# Patient Record
Sex: MALE | Race: WHITE | NOT HISPANIC OR LATINO | Employment: FULL TIME | URBAN - METROPOLITAN AREA
[De-identification: names, ages, dates, MRNs, and addresses within clinical notes are randomized per-mention and may not be internally consistent; named-entity substitution may affect disease eponyms.]

---

## 2020-06-11 ENCOUNTER — OFFICE VISIT (OUTPATIENT)
Dept: PODIATRY | Facility: CLINIC | Age: 65
End: 2020-06-11
Payer: COMMERCIAL

## 2020-06-11 VITALS
DIASTOLIC BLOOD PRESSURE: 103 MMHG | BODY MASS INDEX: 31.92 KG/M2 | HEART RATE: 81 BPM | SYSTOLIC BLOOD PRESSURE: 220 MMHG | HEIGHT: 65 IN | WEIGHT: 191.6 LBS

## 2020-06-11 DIAGNOSIS — E11.40 TYPE 2 DIABETES MELLITUS WITH DIABETIC NEUROPATHY, WITHOUT LONG-TERM CURRENT USE OF INSULIN (HCC): ICD-10-CM

## 2020-06-11 DIAGNOSIS — L97.519 ULCER OF RIGHT FOOT, UNSPECIFIED ULCER STAGE (HCC): Primary | ICD-10-CM

## 2020-06-11 DIAGNOSIS — Z89.421 ABSENCE OF TOE OF RIGHT FOOT (HCC): ICD-10-CM

## 2020-06-11 DIAGNOSIS — Z89.512 ABSENCE OF LEFT LOWER LEG BELOW KNEE (HCC): ICD-10-CM

## 2020-06-11 PROCEDURE — 99204 OFFICE O/P NEW MOD 45 MIN: CPT | Performed by: PODIATRIST

## 2020-06-11 RX ORDER — LISINOPRIL 20 MG/1
TABLET ORAL
COMMUNITY
Start: 2019-08-05 | End: 2021-05-21

## 2020-06-18 ENCOUNTER — PROCEDURE VISIT (OUTPATIENT)
Dept: PODIATRY | Facility: CLINIC | Age: 65
End: 2020-06-18
Payer: COMMERCIAL

## 2020-06-18 VITALS — BODY MASS INDEX: 32.12 KG/M2 | WEIGHT: 192.8 LBS | HEIGHT: 65 IN

## 2020-06-18 DIAGNOSIS — Z89.512 ABSENCE OF LEFT LOWER LEG BELOW KNEE (HCC): ICD-10-CM

## 2020-06-18 DIAGNOSIS — E11.40 TYPE 2 DIABETES MELLITUS WITH DIABETIC NEUROPATHY, WITHOUT LONG-TERM CURRENT USE OF INSULIN (HCC): ICD-10-CM

## 2020-06-18 DIAGNOSIS — L97.519 ULCER OF RIGHT FOOT, UNSPECIFIED ULCER STAGE (HCC): Primary | ICD-10-CM

## 2020-06-18 DIAGNOSIS — Z89.421 ABSENCE OF TOE OF RIGHT FOOT (HCC): ICD-10-CM

## 2020-06-18 PROCEDURE — 99213 OFFICE O/P EST LOW 20 MIN: CPT | Performed by: PODIATRIST

## 2020-07-02 ENCOUNTER — PROCEDURE VISIT (OUTPATIENT)
Dept: PODIATRY | Facility: CLINIC | Age: 65
End: 2020-07-02
Payer: COMMERCIAL

## 2020-07-02 VITALS — TEMPERATURE: 98.1 F

## 2020-07-02 DIAGNOSIS — Z89.421 ABSENCE OF TOE OF RIGHT FOOT (HCC): ICD-10-CM

## 2020-07-02 DIAGNOSIS — Z89.512 ABSENCE OF LEFT LOWER LEG BELOW KNEE (HCC): ICD-10-CM

## 2020-07-02 DIAGNOSIS — E11.40 TYPE 2 DIABETES MELLITUS WITH DIABETIC NEUROPATHY, WITHOUT LONG-TERM CURRENT USE OF INSULIN (HCC): ICD-10-CM

## 2020-07-02 DIAGNOSIS — L97.519 ULCER OF RIGHT FOOT, UNSPECIFIED ULCER STAGE (HCC): Primary | ICD-10-CM

## 2020-07-02 PROCEDURE — 99213 OFFICE O/P EST LOW 20 MIN: CPT | Performed by: PODIATRIST

## 2020-07-02 NOTE — PROGRESS NOTES
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)             PLAN:  1  Patient was counseled and educated on the condition and the diagnosis  2  His wound is healing well without signs of infection  D/C silver alginate  Use topical antibiotic cream daily with dry dressing  3  Discussed proper off-loading and staying off of his foot in order to heal the ulcer  Continue walker and surgical shoe  4  Awaits new diabetic shoes  5  Discussed possible TREMAYNE or TA tendon transfer to correct ankle equinus and muscle imbalance in the future  6  Discussed proper BS control  7  He will return in 2 weeks for re-evaluation of wound  Subjective:     HPI  The patient presents for right foot ulcer  He feels well and wound looks healing  No foot pain  No redness or purulence  BS is under control  He presents with walker and surgical shoe  The following portions of the patient's history were reviewed and updated as appropriate: allergies, current medications, past family history, past medical history, past social history, past surgical history and problem list   All pertinent labs and images were reviewed  Past Medical History  Past Medical History:   Diagnosis Date    Diabetes mellitus ([facility] )     Hypertension     Neuropathy in diabetes [facility])        Past Surgical History  Past Surgical History:   Procedure Laterality Date    AMPUTATION      FOOT SURGERY          Allergies:  Patient has no known allergies      Medications:  Current Outpatient Medications   Medication Sig Dispense Refill    lisinopril (ZESTRIL) 20 mg tablet TAKE 1 TABLET BY MOUTH EVERY DAY IN THE AM      metFORMIN (GLUCOPHAGE) 500 mg tablet Take 500 mg by mouth daily       No current facility-administered medications for this visit  Social History:  Social History     Socioeconomic History    Marital status: Single     Spouse name: None    Number of children: None    Years of education: None    Highest education level: None   Occupational History    None   Social Needs    Financial resource strain: None    Food insecurity:     Worry: None     Inability: None    Transportation needs:     Medical: None     Non-medical: None   Tobacco Use    Smoking status: Current Some Day Smoker     Types: Pipe    Smokeless tobacco: Never Used   Substance and Sexual Activity    Alcohol use: Yes     Alcohol/week: 1 0 standard drinks     Types: 1 Cans of beer per week     Frequency: 4 or more times a week     Drinks per session: 1 or 2    Drug use: Never    Sexual activity: None   Lifestyle    Physical activity:     Days per week: None     Minutes per session: None    Stress: None   Relationships    Social connections:     Talks on phone: None     Gets together: None     Attends Denominational service: None     Active member of club or organization: None     Attends meetings of clubs or organizations: None     Relationship status: None    Intimate partner violence:     Fear of current or ex partner: None     Emotionally abused: None     Physically abused: None     Forced sexual activity: None   Other Topics Concern    None   Social History Narrative    None          Review of Systems   Constitutional: Negative for chills and fever  HENT: Negative for sore throat  Respiratory: Negative for cough and shortness of breath  Cardiovascular: Negative for chest pain  Gastrointestinal: Negative for diarrhea, nausea and vomiting  Skin: Positive for wound  Neurological: Positive for numbness  Negative for weakness  Objective:      Temp 98 1 °F (36 7 °C)          Physical Exam   Constitutional: He is oriented to person, place, and time   He appears well-developed and well-nourished  No distress  Cardiovascular: Normal rate, regular rhythm and intact distal pulses  Pulses:       Dorsalis pedis pulses are 1+ on the right side  Posterior tibial pulses are 1+ on the right side  Pulmonary/Chest: Effort normal and breath sounds normal  No respiratory distress  Musculoskeletal:        Right foot: There is decreased range of motion (Right ankle equinus)  BKA left  TMA right  No acute joint inflammation or edema  Chronic right LE edema with venous stasis  No cellulitis RLE  Feet:   Right Foot: amputated  Skin Integrity: Positive for ulcer and dry skin  Negative for skin breakdown, erythema, warmth or callus  Left Foot: amputated  Neurological: He is alert and oriented to person, place, and time  No cranial nerve deficit  Decreased protective sensation on right foot  Skin: Capillary refill takes less than 2 seconds  No erythema  DFU right TMA stump  It is 0 3 X 0 2 X 0 1 cm  Wound is deep to dermal tissues  No deep probing  No redness, purulence, edema, or signs of infection  Skin is dry  No periwound callus  Psychiatric: His behavior is normal    Vitals reviewed

## 2020-07-16 ENCOUNTER — PROCEDURE VISIT (OUTPATIENT)
Dept: PODIATRY | Facility: CLINIC | Age: 65
End: 2020-07-16
Payer: COMMERCIAL

## 2020-07-16 VITALS — HEIGHT: 64 IN | BODY MASS INDEX: 32.78 KG/M2 | WEIGHT: 192 LBS

## 2020-07-16 DIAGNOSIS — L97.519 ULCER OF RIGHT FOOT, UNSPECIFIED ULCER STAGE (HCC): Primary | ICD-10-CM

## 2020-07-16 DIAGNOSIS — Z89.421 ABSENCE OF TOE OF RIGHT FOOT (HCC): ICD-10-CM

## 2020-07-16 DIAGNOSIS — E11.40 TYPE 2 DIABETES MELLITUS WITH DIABETIC NEUROPATHY, WITHOUT LONG-TERM CURRENT USE OF INSULIN (HCC): ICD-10-CM

## 2020-07-16 DIAGNOSIS — Z89.512 ABSENCE OF LEFT LOWER LEG BELOW KNEE (HCC): ICD-10-CM

## 2020-07-16 PROCEDURE — 99214 OFFICE O/P EST MOD 30 MIN: CPT | Performed by: PODIATRIST

## 2020-07-16 RX ORDER — ATORVASTATIN CALCIUM 20 MG/1
20 TABLET, FILM COATED ORAL DAILY
COMMUNITY
Start: 2020-07-06 | End: 2021-07-06

## 2020-07-16 RX ORDER — CEPHALEXIN 500 MG/1
500 CAPSULE ORAL 4 TIMES DAILY
Qty: 40 CAPSULE | Refills: 0 | Status: SHIPPED | OUTPATIENT
Start: 2020-07-16 | End: 2020-07-26

## 2020-07-16 NOTE — PROGRESS NOTES
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)             PLAN:  1  Patient was counseled and educated on the condition and the diagnosis  2  Previous ulcer is healed  He presents with new wounds/ DTI right lateral TMA site  No clinical signs of abscess  3  Will order X-ray (X-ray was not working in the office today)  Sent him for blood work including CBCD, CRP, and ESR  4  Start him on wet to dry with betadine right foot wounds  Instructed NWB right foot  5  Start him on Keflex to reduce any bioburden  6  I will see him on Tuesday  Instructed to notify us with any worsening of condition, redness, edema, signs of infection, increased drainage, or constitutional signs of infection  Subjective:     HPI  The patient presents for right foot ulcer  He developed new ulcer on lateral side of right foot  He noticed Tuesday when he was changing the dressing  He does not know how it started, but reports that he stubbed right foot on Saturday last week  No foot pain  No drainage  Previous ulcer looks healed  BS is under control  He presents with walker and surgical shoe today  The following portions of the patient's history were reviewed and updated as appropriate: allergies, current medications, past family history, past medical history, past social history, past surgical history and problem list   All pertinent labs and images were reviewed        Past Medical History  Past Medical History:   Diagnosis Date    Diabetes mellitus ([address] )     Hypertension     Neuropathy in diabetes [facility])        Past Surgical History  Past Surgical History:   Procedure Laterality Date    AMPUTATION      FOOT SURGERY          Allergies:  Patient has no known allergies  Medications:  Current Outpatient Medications   Medication Sig Dispense Refill    cephalexin (KEFLEX) 500 mg capsule Take 1 capsule (500 mg total) by mouth 4 (four) times a day for 10 days 40 capsule 0    lisinopril (ZESTRIL) 20 mg tablet TAKE 1 TABLET BY MOUTH EVERY DAY IN THE AM      metFORMIN (GLUCOPHAGE) 500 mg tablet Take 500 mg by mouth daily       No current facility-administered medications for this visit  Social History:  Social History     Socioeconomic History    Marital status: Single     Spouse name: None    Number of children: None    Years of education: None    Highest education level: None   Occupational History    None   Social Needs    Financial resource strain: None    Food insecurity:     Worry: None     Inability: None    Transportation needs:     Medical: None     Non-medical: None   Tobacco Use    Smoking status: Current Some Day Smoker     Types: Pipe    Smokeless tobacco: Never Used   Substance and Sexual Activity    Alcohol use:  Yes     Alcohol/week: 1 0 standard drinks     Types: 1 Cans of beer per week     Frequency: 4 or more times a week     Drinks per session: 1 or 2    Drug use: Never    Sexual activity: None   Lifestyle    Physical activity:     Days per week: None     Minutes per session: None    Stress: None   Relationships    Social connections:     Talks on phone: None     Gets together: None     Attends Synagogue service: None     Active member of club or organization: None     Attends meetings of clubs or organizations: None     Relationship status: None    Intimate partner violence:     Fear of current or ex partner: None     Emotionally abused: None     Physically abused: None     Forced sexual activity: None   Other Topics Concern    None   Social History Narrative    None Review of Systems   Constitutional: Negative for chills and fever  HENT: Negative for sore throat  Respiratory: Negative for cough and shortness of breath  Cardiovascular: Negative for chest pain  Gastrointestinal: Negative for diarrhea, nausea and vomiting  Skin: Positive for wound  Neurological: Positive for numbness  Negative for weakness  Objective:     Physical Exam   Constitutional: He is oriented to person, place, and time  He appears well-developed and well-nourished  No distress  Cardiovascular: Normal rate, regular rhythm and intact distal pulses  Pulses:       Dorsalis pedis pulses are 1+ on the right side  Posterior tibial pulses are 1+ on the right side  No cyanosis  Pulmonary/Chest: Effort normal and breath sounds normal  No respiratory distress  Musculoskeletal: He exhibits no edema or tenderness  Right foot: There is decreased range of motion (Right ankle equinus)  BKA left  TMA right  No acute joint inflammation or edema  Chronic right LE edema with venous stasis  No cellulitis RLE  Feet:   Right Foot: amputated  Skin Integrity: Positive for ulcer and dry skin  Negative for skin breakdown, erythema, warmth or callus  Left Foot: amputated  Neurological: He is alert and oriented to person, place, and time  No cranial nerve deficit  Decreased protective sensation on right foot  Skin: Capillary refill takes less than 2 seconds  No erythema  Previous ulcer is healed right TMA stump  New wound X 2 presents right foot  1  Distal lateral wound is 3 0 X 2 0 cm with epidermolysis  Minimal depth  No drainage  Dark discoloration / DTI  2  Lateral wound / DTI is 1 3 X 1 0 cm with minimal depth  No drainage  No fluctuation or crepitus  No signs of abscess  No significant redness or edema right foot  Psychiatric: His behavior is normal    Vitals reviewed

## 2020-07-17 ENCOUNTER — HOSPITAL ENCOUNTER (OUTPATIENT)
Dept: RADIOLOGY | Facility: HOSPITAL | Age: 65
Discharge: HOME | End: 2020-07-17
Attending: PODIATRIST
Payer: COMMERCIAL

## 2020-07-17 ENCOUNTER — TRANSCRIBE ORDERS (OUTPATIENT)
Dept: RADIOLOGY | Facility: HOSPITAL | Age: 65
End: 2020-07-17

## 2020-07-17 ENCOUNTER — APPOINTMENT (OUTPATIENT)
Dept: LAB | Facility: HOSPITAL | Age: 65
End: 2020-07-17
Attending: PODIATRIST
Payer: COMMERCIAL

## 2020-07-17 DIAGNOSIS — L97.519 ULCER OF RIGHT FOOT, UNSPECIFIED ULCER STAGE (HCC): ICD-10-CM

## 2020-07-17 LAB
BASOPHILS # BLD AUTO: 0.09 THOUSANDS/ΜL (ref 0–0.1)
BASOPHILS NFR BLD AUTO: 1 % (ref 0–1)
CRP SERPL QL: 17.1 MG/L
EOSINOPHIL # BLD AUTO: 0.2 THOUSAND/ΜL (ref 0–0.61)
EOSINOPHIL NFR BLD AUTO: 2 % (ref 0–6)
ERYTHROCYTE [DISTWIDTH] IN BLOOD BY AUTOMATED COUNT: 11.9 % (ref 11.6–15.1)
ERYTHROCYTE [SEDIMENTATION RATE] IN BLOOD: 40 MM/HOUR (ref 0–10)
HCT VFR BLD AUTO: 49.5 % (ref 36.5–49.3)
HGB BLD-MCNC: 17 G/DL (ref 12–17)
IMM GRANULOCYTES # BLD AUTO: 0.08 THOUSAND/UL (ref 0–0.2)
IMM GRANULOCYTES NFR BLD AUTO: 1 % (ref 0–2)
LYMPHOCYTES # BLD AUTO: 1.65 THOUSANDS/ΜL (ref 0.6–4.47)
LYMPHOCYTES NFR BLD AUTO: 18 % (ref 14–44)
MCH RBC QN AUTO: 33.3 PG (ref 26.8–34.3)
MCHC RBC AUTO-ENTMCNC: 34.3 G/DL (ref 31.4–37.4)
MCV RBC AUTO: 97 FL (ref 82–98)
MONOCYTES # BLD AUTO: 0.84 THOUSAND/ΜL (ref 0.17–1.22)
MONOCYTES NFR BLD AUTO: 9 % (ref 4–12)
NEUTROPHILS # BLD AUTO: 6.12 THOUSANDS/ΜL (ref 1.85–7.62)
NEUTS SEG NFR BLD AUTO: 69 % (ref 43–75)
NRBC BLD AUTO-RTO: 0 /100 WBCS
PLATELET # BLD AUTO: 276 THOUSANDS/UL (ref 149–390)
PMV BLD AUTO: 9.7 FL (ref 8.9–12.7)
RBC # BLD AUTO: 5.11 MILLION/UL (ref 3.88–5.62)
WBC # BLD AUTO: 8.98 THOUSAND/UL (ref 4.31–10.16)

## 2020-07-17 PROCEDURE — 73630 X-RAY EXAM OF FOOT: CPT

## 2020-07-17 PROCEDURE — 85652 RBC SED RATE AUTOMATED: CPT

## 2020-07-17 PROCEDURE — 85025 COMPLETE CBC W/AUTO DIFF WBC: CPT

## 2020-07-17 PROCEDURE — 86140 C-REACTIVE PROTEIN: CPT

## 2020-07-17 PROCEDURE — 36415 COLL VENOUS BLD VENIPUNCTURE: CPT

## 2020-07-20 ENCOUNTER — TELEPHONE (OUTPATIENT)
Dept: PODIATRY | Facility: CLINIC | Age: 65
End: 2020-07-20

## 2020-07-20 NOTE — TELEPHONE ENCOUNTER
I saw the pictures  It can be expected with betadine  X-ray was negative and have him see me tomorrow as scheduled  Make sure he stays off of his right foot

## 2020-07-21 ENCOUNTER — OFFICE VISIT (OUTPATIENT)
Dept: PODIATRY | Facility: CLINIC | Age: 65
End: 2020-07-21
Payer: COMMERCIAL

## 2020-07-21 VITALS — BODY MASS INDEX: 32.96 KG/M2 | HEIGHT: 64 IN | TEMPERATURE: 100.4 F

## 2020-07-21 DIAGNOSIS — Z89.421 ABSENCE OF TOE OF RIGHT FOOT (HCC): ICD-10-CM

## 2020-07-21 DIAGNOSIS — Z89.512 ABSENCE OF LEFT LOWER LEG BELOW KNEE (HCC): ICD-10-CM

## 2020-07-21 DIAGNOSIS — L97.519 ULCER OF RIGHT FOOT, UNSPECIFIED ULCER STAGE (HCC): Primary | ICD-10-CM

## 2020-07-21 DIAGNOSIS — E11.40 TYPE 2 DIABETES MELLITUS WITH DIABETIC NEUROPATHY, WITHOUT LONG-TERM CURRENT USE OF INSULIN (HCC): ICD-10-CM

## 2020-07-21 PROCEDURE — 11042 DBRDMT SUBQ TIS 1ST 20SQCM/<: CPT | Performed by: PODIATRIST

## 2020-07-21 PROCEDURE — 99213 OFFICE O/P EST LOW 20 MIN: CPT | Performed by: PODIATRIST

## 2020-07-21 NOTE — PROGRESS NOTES
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)             PLAN:  1  Patient was counseled and educated on the condition and the diagnosis  2  Reviewed and discussed X-ray and blood work  3  Wound debrided  D/C betadine  Start silver alginate  Instructed NWB right foot  4  Finish Keflex as prescribed  5  Instructed to notify us with any worsening of condition, redness, edema, signs of infection, increased drainage, or constitutional signs of infection  RA in 1 week  PROCEDURE:  Under aseptic technique, I performed sharp excisional debridement of epidermal, dermal, and subcutaneous tissues of the wound down to healthy bleeding tissue <20 sq cm using #15 scalpel  I thoroughly explored the wound for deep infection or exposure of deep tissues  Sterile dressing was applied to the wound and instructed daily dressing change  Subjective:     HPI  The patient presents for right foot ulcer  Tolerates antibiotics well  No redness or increased edema right foot  No new complaint  Previous ulcer remains healed  BS is under control  He presents with walker and surgical shoe today  The following portions of the patient's history were reviewed and updated as appropriate: allergies, current medications, past family history, past medical history, past social history, past surgical history and problem list   All pertinent labs and images were reviewed        Past Medical History  Past Medical History:   Diagnosis Date    Diabetes mellitus ([facility] )     Hypertension     Neuropathy in diabetes [facility])        Past Surgical History  Past Surgical History:   Procedure Laterality Date    AMPUTATION      FOOT SURGERY Allergies:  Shellfish allergy    Medications:  Current Outpatient Medications   Medication Sig Dispense Refill    atorvastatin (LIPITOR) 20 mg tablet Take 20 mg by mouth daily      cephalexin (KEFLEX) 500 mg capsule Take 1 capsule (500 mg total) by mouth 4 (four) times a day for 10 days 40 capsule 0    lisinopril (ZESTRIL) 20 mg tablet TAKE 1 TABLET BY MOUTH EVERY DAY IN THE AM      metFORMIN (GLUCOPHAGE) 500 mg tablet Take 500 mg by mouth daily       No current facility-administered medications for this visit  Social History:  Social History     Socioeconomic History    Marital status: Single     Spouse name: None    Number of children: None    Years of education: None    Highest education level: None   Occupational History    None   Social Needs    Financial resource strain: None    Food insecurity:     Worry: None     Inability: None    Transportation needs:     Medical: None     Non-medical: None   Tobacco Use    Smoking status: Current Some Day Smoker     Types: Pipe    Smokeless tobacco: Never Used   Substance and Sexual Activity    Alcohol use:  Yes     Alcohol/week: 1 0 standard drinks     Types: 1 Cans of beer per week     Frequency: 4 or more times a week     Drinks per session: 1 or 2    Drug use: Never    Sexual activity: None   Lifestyle    Physical activity:     Days per week: None     Minutes per session: None    Stress: None   Relationships    Social connections:     Talks on phone: None     Gets together: None     Attends Religion service: None     Active member of club or organization: None     Attends meetings of clubs or organizations: None     Relationship status: None    Intimate partner violence:     Fear of current or ex partner: None     Emotionally abused: None     Physically abused: None     Forced sexual activity: None   Other Topics Concern    None   Social History Narrative    None          Review of Systems   Constitutional: Negative for chills and fever    HENT: Negative for sore throat  Respiratory: Negative for cough and shortness of breath  Cardiovascular: Negative for chest pain  Gastrointestinal: Negative for diarrhea, nausea and vomiting  Skin: Positive for wound  Neurological: Positive for numbness  Negative for weakness  Objective:     Physical Exam   Constitutional: He is oriented to person, place, and time  He appears well-developed and well-nourished  No distress  Cardiovascular: Normal rate, regular rhythm and intact distal pulses  Pulses:       Dorsalis pedis pulses are 1+ on the right side  Posterior tibial pulses are 1+ on the right side  No cyanosis  Pulmonary/Chest: Effort normal and breath sounds normal  No respiratory distress  Musculoskeletal: He exhibits no edema or tenderness  Right foot: There is decreased range of motion (Right ankle equinus)  BKA left  TMA right  No acute joint inflammation or edema  Chronic right LE edema with venous stasis  No cellulitis RLE  Feet:   Right Foot: amputated  Skin Integrity: Positive for ulcer and dry skin  Negative for skin breakdown, erythema, warmth or callus  Left Foot: amputated  Neurological: He is alert and oriented to person, place, and time  No cranial nerve deficit  Decreased protective sensation on right foot  Skin: Capillary refill takes less than 2 seconds  No erythema  Previous ulcer remains healed right TMA stump  New wound X 2 presents right foot  They are connected  It measures 4 5 X 3 8 cm in total   Eschar/ DTI over the wound  No fluctuation or crepitus  No signs of abscess  No significant redness or edema right foot  Psychiatric: His behavior is normal    Vitals reviewed

## 2020-07-28 ENCOUNTER — PROCEDURE VISIT (OUTPATIENT)
Dept: PODIATRY | Facility: CLINIC | Age: 65
End: 2020-07-28
Payer: COMMERCIAL

## 2020-07-28 VITALS — WEIGHT: 193 LBS | HEIGHT: 64 IN | BODY MASS INDEX: 32.95 KG/M2

## 2020-07-28 DIAGNOSIS — L97.319 LOWER LIMB ULCER, ANKLE, RIGHT, WITH UNSPECIFIED SEVERITY (HCC): ICD-10-CM

## 2020-07-28 DIAGNOSIS — E11.40 TYPE 2 DIABETES MELLITUS WITH DIABETIC NEUROPATHY, WITHOUT LONG-TERM CURRENT USE OF INSULIN (HCC): ICD-10-CM

## 2020-07-28 DIAGNOSIS — Z89.421 ABSENCE OF TOE OF RIGHT FOOT (HCC): ICD-10-CM

## 2020-07-28 DIAGNOSIS — L97.519 ULCER OF RIGHT FOOT, UNSPECIFIED ULCER STAGE (HCC): Primary | ICD-10-CM

## 2020-07-28 DIAGNOSIS — Z89.512 ABSENCE OF LEFT LOWER LEG BELOW KNEE (HCC): ICD-10-CM

## 2020-07-28 PROCEDURE — 99213 OFFICE O/P EST LOW 20 MIN: CPT | Performed by: PODIATRIST

## 2020-07-28 RX ORDER — LISINOPRIL 30 MG/1
40 TABLET ORAL
COMMUNITY
Start: 2020-07-13

## 2020-07-28 NOTE — PROGRESS NOTES
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)             PLAN:  1  Patient was counseled and educated on the condition and the diagnosis  2  He presents with new wound that he was not aware  It could be from rolled up Tubigrip  It is also evident that he does not stay off of his feet enough  Will use betadine again  Instructed NWB right foot  3  Discussed possible in-patient treatment  May need OR debridement / possible wound VAC / placement  4  Instructed to notify us with any worsening of condition, redness, edema, signs of infection, increased drainage, or constitutional signs of infection  RA in 1 week  Subjective:     HPI  The patient presents for right foot ulcer  No redness  No increased edema  BS is under control  He presents with walker and surgical shoe today  The following portions of the patient's history were reviewed and updated as appropriate: allergies, current medications, past family history, past medical history, past social history, past surgical history and problem list   All pertinent labs and images were reviewed        Past Medical History  Past Medical History:   Diagnosis Date    Diabetes mellitus ([facility] )     Hypertension     Neuropathy in diabetes ([facility] )        Past Surgical History  Past Surgical History:   Procedure Laterality Date    AMPUTATION      FOOT SURGERY          Allergies:  Shellfish allergy    Medications:  Current Outpatient Medications   Medication Sig Dispense Refill    atorvastatin (LIPITOR) 20 mg tablet Take 20 mg by mouth daily      lisinopril (ZESTRIL) 20 mg tablet TAKE 1 TABLET BY MOUTH EVERY DAY IN THE AM      lisinopril (ZESTRIL) 30 mg tablet TAKE 1 TABLET (30 MG TOTAL) BY MOUTH DAILY  IN THE AM      metFORMIN (GLUCOPHAGE) 500 mg tablet Take 500 mg by mouth daily       No current facility-administered medications for this visit  Social History:  Social History     Socioeconomic History    Marital status: Single     Spouse name: None    Number of children: None    Years of education: None    Highest education level: None   Occupational History    None   Social Needs    Financial resource strain: None    Food insecurity:     Worry: None     Inability: None    Transportation needs:     Medical: None     Non-medical: None   Tobacco Use    Smoking status: Current Some Day Smoker     Types: Pipe    Smokeless tobacco: Never Used   Substance and Sexual Activity    Alcohol use: Yes     Alcohol/week: 1 0 standard drinks     Types: 1 Cans of beer per week     Frequency: 4 or more times a week     Drinks per session: 1 or 2    Drug use: Never    Sexual activity: None   Lifestyle    Physical activity:     Days per week: None     Minutes per session: None    Stress: None   Relationships    Social connections:     Talks on phone: None     Gets together: None     Attends Mosque service: None     Active member of club or organization: None     Attends meetings of clubs or organizations: None     Relationship status: None    Intimate partner violence:     Fear of current or ex partner: None     Emotionally abused: None     Physically abused: None     Forced sexual activity: None   Other Topics Concern    None   Social History Narrative    None          Review of Systems   Constitutional: Negative for chills and fever  HENT: Negative for sore throat  Respiratory: Negative for cough and shortness of breath  Cardiovascular: Negative for chest pain  Gastrointestinal: Negative for diarrhea, nausea and vomiting  Skin: Positive for wound  Neurological: Positive for numbness  Negative for weakness  Objective:     Physical Exam   Constitutional: He is oriented to person, place, and time  He appears well-developed and well-nourished  No distress  Cardiovascular: Normal rate, regular rhythm and intact distal pulses  Pulses:       Dorsalis pedis pulses are 1+ on the right side  Posterior tibial pulses are 1+ on the right side  No cyanosis  Pulmonary/Chest: Effort normal and breath sounds normal  No respiratory distress  Musculoskeletal: He exhibits no edema or tenderness  Right foot: There is decreased range of motion (Right ankle equinus)  BKA left  TMA right  No acute joint inflammation or edema  Chronic right LE edema with venous stasis  No cellulitis RLE  Feet:   Right Foot: amputated  Skin Integrity: Positive for ulcer and dry skin  Negative for skin breakdown, erythema, warmth or callus  Left Foot: amputated  Neurological: He is alert and oriented to person, place, and time  No cranial nerve deficit  Decreased protective sensation on right foot  Skin: Capillary refill takes less than 2 seconds  No erythema  Previous ulcer remains healed right TMA stump  Wound X 2 presents right foot  They are connected  It measures 5 0 X 3 5 cm in total   Eschar/ DTI over the wound  New wound with eschar on right posterior ankle  It is 1 0 X 1 0 cm  No fluctuation or crepitus  No signs of abscess  No significant redness or edema right foot  Psychiatric: His behavior is normal    Vitals reviewed

## 2020-07-30 ENCOUNTER — TELEPHONE (OUTPATIENT)
Dept: PODIATRY | Facility: CLINIC | Age: 65
End: 2020-07-30

## 2020-08-07 ENCOUNTER — PROCEDURE VISIT (OUTPATIENT)
Dept: PODIATRY | Facility: CLINIC | Age: 65
End: 2020-08-07
Payer: COMMERCIAL

## 2020-08-07 VITALS
HEART RATE: 79 BPM | TEMPERATURE: 96.3 F | SYSTOLIC BLOOD PRESSURE: 166 MMHG | BODY MASS INDEX: 33.13 KG/M2 | HEIGHT: 64 IN | DIASTOLIC BLOOD PRESSURE: 89 MMHG

## 2020-08-07 DIAGNOSIS — L97.319 LOWER LIMB ULCER, ANKLE, RIGHT, WITH UNSPECIFIED SEVERITY (HCC): ICD-10-CM

## 2020-08-07 DIAGNOSIS — E11.40 TYPE 2 DIABETES MELLITUS WITH DIABETIC NEUROPATHY, WITHOUT LONG-TERM CURRENT USE OF INSULIN (HCC): ICD-10-CM

## 2020-08-07 DIAGNOSIS — L97.519 ULCER OF RIGHT FOOT, UNSPECIFIED ULCER STAGE (HCC): Primary | ICD-10-CM

## 2020-08-07 PROCEDURE — 11042 DBRDMT SUBQ TIS 1ST 20SQCM/<: CPT | Performed by: PODIATRIST

## 2020-08-07 NOTE — PROGRESS NOTES
PATIENT:  Betina Ritika  1955       ASSESSMENT:     1  Ulcer of right foot, unspecified ulcer stage ([facility] )  Debridement   2  Lower limb ulcer, ankle, right, with unspecified severity ([facility] )  Debridement   3  Type 2 diabetes mellitus with diabetic neuropathy, without long-term current use of insulin ([facility] )             PLAN:  1  Patient was counseled and educated on the condition and the diagnosis  2  Wounds are stable now  Continue serial debridement  Silver alginate to all wounds  Instructed NWB right foot  3  Possible in-patient treatment depending on the progress  He is agreeable to the treatment plan  4  Instructed to notify us with any worsening of condition, redness, edema, signs of infection, increased drainage, or constitutional signs of infection  RA in 1 week  PROCEDURE:  Under aseptic technique, I performed sharp excisional debridement of epidermal, dermal, and subcutaneous tissues of the wound down to healthy bleeding tissue <20 sq cm using #15 scalpel  I thoroughly explored the wound for deep infection or exposure of deep tissues  Sterile dressing was applied to the wound and instructed daily dressing change  Subjective:     HPI  The patient presents for right foot and ankle ulcer  He feels well  Wounds are looking better  No redness, edema, or purulence  BS is under control  He presents with walker and surgical shoe today  He has trouble with NWB  The following portions of the patient's history were reviewed and updated as appropriate: allergies, current medications, past family history, past medical history, past social history, past surgical history and problem list   All pertinent labs and images were reviewed        Past Medical History  Past Medical History:   Diagnosis Date    Diabetes mellitus ([facility] )     Hypertension     Neuropathy in diabetes [facility])        Past Surgical History  Past Surgical History:   Procedure Laterality Date    AMPUTATION      FOOT SURGERY          Allergies:  Shellfish allergy    Medications:  Current Outpatient Medications   Medication Sig Dispense Refill    atorvastatin (LIPITOR) 20 mg tablet Take 20 mg by mouth daily      lisinopril (ZESTRIL) 20 mg tablet TAKE 1 TABLET BY MOUTH EVERY DAY IN THE AM      lisinopril (ZESTRIL) 30 mg tablet TAKE 1 TABLET (30 MG TOTAL) BY MOUTH DAILY  IN THE AM      metFORMIN (GLUCOPHAGE) 500 mg tablet Take 500 mg by mouth daily       No current facility-administered medications for this visit  Social History:  Social History     Socioeconomic History    Marital status: Single     Spouse name: None    Number of children: None    Years of education: None    Highest education level: None   Occupational History    None   Social Needs    Financial resource strain: None    Food insecurity     Worry: None     Inability: None    Transportation needs     Medical: None     Non-medical: None   Tobacco Use    Smoking status: Current Some Day Smoker     Types: Pipe    Smokeless tobacco: Never Used   Substance and Sexual Activity    Alcohol use:  Yes     Alcohol/week: 1 0 standard drinks     Types: 1 Cans of beer per week     Frequency: 4 or more times a week     Drinks per session: 1 or 2    Drug use: Never    Sexual activity: None   Lifestyle    Physical activity     Days per week: None     Minutes per session: None    Stress: None   Relationships    Social connections     Talks on phone: None     Gets together: None     Attends Sikhism service: None     Active member of club or organization: None     Attends meetings of clubs or organizations: None     Relationship status: None    Intimate partner violence     Fear of current or ex partner: None     Emotionally abused: None     Physically abused: None     Forced sexual activity: None   Other Topics Concern    None   Social History Narrative    None          Review of Systems   Constitutional: Negative for chills and fever    HENT: Negative for sore throat  Respiratory: Negative for cough and shortness of breath  Cardiovascular: Negative for chest pain  Gastrointestinal: Negative for diarrhea, nausea and vomiting  Skin: Positive for wound  Neurological: Positive for numbness  Negative for weakness  Objective:     Physical Exam  Vitals signs reviewed  Constitutional:       General: He is not in acute distress  Appearance: He is well-developed  Cardiovascular:      Rate and Rhythm: Normal rate and regular rhythm  Pulses:           Dorsalis pedis pulses are 1+ on the right side  Posterior tibial pulses are 1+ on the right side  Comments: No cyanosis  Pulmonary:      Effort: Pulmonary effort is normal  No respiratory distress  Breath sounds: Normal breath sounds  Musculoskeletal:         General: No tenderness  Right foot: Decreased range of motion (Right ankle equinus)  Comments: BKA left  TMA right  No acute joint inflammation or edema  Chronic right LE edema with venous stasis  No cellulitis RLE  Feet:      Right foot:      Skin integrity: Ulcer and dry skin present  No skin breakdown, erythema, warmth or callus  Skin:     Capillary Refill: Capillary refill takes less than 2 seconds  Findings: No erythema  Comments: Wound presents right TMA site  It measures 5 0 X 3 1 cm in total   Eschar is loose now  Ulcer on right posterior ankle  Eschar is loose and wound is stable  2 0 X 1 3 cm  Wound bed is fibrous  No signs of infection  No purulence  No fluctuation or crepitus  No signs of abscess  No significant redness or edema right foot or ankle  Neurological:      Mental Status: He is alert and oriented to person, place, and time  Cranial Nerves: No cranial nerve deficit  Comments: Decreased protective sensation on right foot     Psychiatric:         Behavior: Behavior normal

## 2020-08-14 ENCOUNTER — OFFICE VISIT (OUTPATIENT)
Dept: PODIATRY | Facility: CLINIC | Age: 65
End: 2020-08-14
Payer: COMMERCIAL

## 2020-08-14 VITALS
HEIGHT: 64 IN | DIASTOLIC BLOOD PRESSURE: 75 MMHG | HEART RATE: 86 BPM | SYSTOLIC BLOOD PRESSURE: 173 MMHG | BODY MASS INDEX: 33.13 KG/M2

## 2020-08-14 DIAGNOSIS — L97.519 ULCER OF RIGHT FOOT, UNSPECIFIED ULCER STAGE (HCC): Primary | ICD-10-CM

## 2020-08-14 DIAGNOSIS — E11.40 TYPE 2 DIABETES MELLITUS WITH DIABETIC NEUROPATHY, WITHOUT LONG-TERM CURRENT USE OF INSULIN (HCC): ICD-10-CM

## 2020-08-14 DIAGNOSIS — L97.319 LOWER LIMB ULCER, ANKLE, RIGHT, WITH UNSPECIFIED SEVERITY (HCC): ICD-10-CM

## 2020-08-14 PROCEDURE — 11042 DBRDMT SUBQ TIS 1ST 20SQCM/<: CPT | Performed by: PODIATRIST

## 2020-08-14 RX ORDER — SODIUM HYPOCHLORITE 2.5 MG/ML
1 SOLUTION TOPICAL ONCE
Qty: 473 ML | Refills: 2 | Status: SHIPPED | OUTPATIENT
Start: 2020-08-14 | End: 2020-08-14

## 2020-08-14 NOTE — PROGRESS NOTES
PATIENT:  Salome Szymanski  1955       ASSESSMENT:     1  Ulcer of right foot, unspecified ulcer stage ([facility])  sodium hypochlorite (DAKIN'S HALF-STRENGTH) external solution    Debridement   2  Lower limb ulcer, ankle, right, with unspecified severity ([facility] [address] )  Debridement   3  Type 2 diabetes mellitus with diabetic neuropathy, without long-term current use of insulin ([facility] [address] )             PLAN:  1  Patient was counseled and educated on the condition and the diagnosis  2  Wounds are stable now  Continue serial debridement  Silver alginate to all wounds  Instructed NWB right foot  Will add Dakin solution for wash his wound with dressing change  3  Possible in-patient treatment depending on the progress  He is agreeable to the treatment plan  4  Instructed to notify us with any worsening of condition, redness, edema, signs of infection, increased drainage, or constitutional signs of infection  RA in 1 week  PROCEDURE:  Under aseptic technique, I performed sharp excisional debridement of epidermal, dermal, and subcutaneous tissues of the wound down to healthy bleeding tissue <20 sq cm using #15 scalpel  I thoroughly explored the wound for deep infection or exposure of deep tissues  Sterile dressing was applied to the wound and instructed daily dressing change  Subjective:     HPI  The patient presents for right foot and ankle ulcer  He feels well  No redness, edema, or purulence  BS is under control  He presents with walker and surgical shoe today  He still puts pressure on right foot  The following portions of the patient's history were reviewed and updated as appropriate: allergies, current medications, past family history, past medical history, past social history, past surgical history and problem list   All pertinent labs and images were reviewed        Past Medical History  Past Medical History:   Diagnosis Date    Diabetes mellitus ([facility] )     Hypertension     Neuropathy in diabetes [facility])        Past Surgical History  Past Surgical History:   Procedure Laterality Date    AMPUTATION      FOOT SURGERY          Allergies:  Shellfish allergy    Medications:  Current Outpatient Medications   Medication Sig Dispense Refill    atorvastatin (LIPITOR) 20 mg tablet Take 20 mg by mouth daily      lisinopril (ZESTRIL) 20 mg tablet TAKE 1 TABLET BY MOUTH EVERY DAY IN THE AM      lisinopril (ZESTRIL) 30 mg tablet TAKE 1 TABLET (30 MG TOTAL) BY MOUTH DAILY  IN THE AM      metFORMIN (GLUCOPHAGE) 500 mg tablet Take 500 mg by mouth daily      sodium hypochlorite (DAKIN'S HALF-STRENGTH) external solution Apply 1 application topically once for 1 dose 473 mL 2     No current facility-administered medications for this visit  Social History:  Social History     Socioeconomic History    Marital status: Single     Spouse name: None    Number of children: None    Years of education: None    Highest education level: None   Occupational History    None   Social Needs    Financial resource strain: None    Food insecurity     Worry: None     Inability: None    Transportation needs     Medical: None     Non-medical: None   Tobacco Use    Smoking status: Current Some Day Smoker     Types: Pipe    Smokeless tobacco: Never Used   Substance and Sexual Activity    Alcohol use:  Yes     Alcohol/week: 1 0 standard drinks     Types: 1 Cans of beer per week     Frequency: 4 or more times a week     Drinks per session: 1 or 2    Drug use: Never    Sexual activity: None   Lifestyle    Physical activity     Days per week: None     Minutes per session: None    Stress: None   Relationships    Social connections     Talks on phone: None     Gets together: None     Attends Adventist service: None     Active member of club or organization: None     Attends meetings of clubs or organizations: None     Relationship status: None    Intimate partner violence     Fear of current or ex partner: None     Emotionally abused: None     Physically abused: None     Forced sexual activity: None   Other Topics Concern    None   Social History Narrative    None          Review of Systems   Constitutional: Negative for chills and fever  HENT: Negative for sore throat  Respiratory: Negative for cough and shortness of breath  Cardiovascular: Negative for chest pain  Gastrointestinal: Negative for diarrhea, nausea and vomiting  Skin: Positive for wound  Neurological: Positive for numbness  Negative for weakness  Objective:     Physical Exam  Vitals signs reviewed  Constitutional:       General: He is not in acute distress  Appearance: He is well-developed  Cardiovascular:      Rate and Rhythm: Normal rate and regular rhythm  Pulses:           Dorsalis pedis pulses are 1+ on the right side  Posterior tibial pulses are 1+ on the right side  Comments: No cyanosis  Pulmonary:      Effort: Pulmonary effort is normal  No respiratory distress  Breath sounds: Normal breath sounds  Musculoskeletal:         General: No tenderness  Right foot: Decreased range of motion (Right ankle equinus)  Comments: BKA left  TMA right  No acute joint inflammation or edema  Chronic right LE edema with venous stasis  No cellulitis RLE  Feet:      Right foot:      Skin integrity: Ulcer and dry skin present  No skin breakdown, erythema, warmth or callus  Skin:     Capillary Refill: Capillary refill takes less than 2 seconds  Findings: No erythema  Comments: Wound presents right TMA site  It measures 5 0 X 2 5 cm in total   Wound bed is 70% granular  He has antoher ulcer on right posterior ankle  Wound is stable  1 8 X 1 3 cm  Increased granular tissues  No signs of infection  No purulence  No fluctuation or crepitus  No signs of abscess  No significant redness or edema right foot or ankle  No deep probing on both wounds       Neurological:      Mental Status: He is alert and oriented to person, place, and time  Cranial Nerves: No cranial nerve deficit  Comments: Decreased protective sensation on right foot     Psychiatric:         Behavior: Behavior normal

## 2020-08-21 ENCOUNTER — PROCEDURE VISIT (OUTPATIENT)
Dept: PODIATRY | Facility: CLINIC | Age: 65
End: 2020-08-21
Payer: COMMERCIAL

## 2020-08-21 VITALS
HEART RATE: 75 BPM | BODY MASS INDEX: 33.13 KG/M2 | SYSTOLIC BLOOD PRESSURE: 156 MMHG | HEIGHT: 64 IN | DIASTOLIC BLOOD PRESSURE: 78 MMHG

## 2020-08-21 DIAGNOSIS — L97.519 ULCER OF RIGHT FOOT, UNSPECIFIED ULCER STAGE (HCC): Primary | ICD-10-CM

## 2020-08-21 DIAGNOSIS — L97.319 LOWER LIMB ULCER, ANKLE, RIGHT, WITH UNSPECIFIED SEVERITY (HCC): ICD-10-CM

## 2020-08-21 DIAGNOSIS — E11.40 TYPE 2 DIABETES MELLITUS WITH DIABETIC NEUROPATHY, WITHOUT LONG-TERM CURRENT USE OF INSULIN (HCC): ICD-10-CM

## 2020-08-21 PROCEDURE — 99213 OFFICE O/P EST LOW 20 MIN: CPT | Performed by: PODIATRIST

## 2020-08-21 NOTE — PROGRESS NOTES
PATIENT:  Peter Adams  1955       ASSESSMENT:     1  Ulcer of right foot, unspecified ulcer stage ([facility] )     2  Lower limb ulcer, ankle, right, with unspecified severity ([facility] )     3  Type 2 diabetes mellitus with diabetic neuropathy, without long-term current use of insulin ([facility] )             PLAN:  1  Patient was counseled and educated on the condition and the diagnosis  2  Wound is slowly improving  Instructed NWB right foot if possible  Continue Dakin solution for wash his wound with dressing change  Consider CAM walker for off-loading  3  Possible in-patient treatment depending on the progress  4  Instructed to notify us with any worsening of condition, redness, edema, signs of infection, increased drainage, or constitutional signs of infection  RA in 1 week  Subjective:     HPI  The patient presents for right foot and ankle ulcer  He feels well  No redness, edema, or purulence  BS is under control  He presents with walker and surgical shoe today  He has difficulty keeping his right foot off  The following portions of the patient's history were reviewed and updated as appropriate: allergies, current medications, past family history, past medical history, past social history, past surgical history and problem list   All pertinent labs and images were reviewed        Past Medical History  Past Medical History:   Diagnosis Date    Diabetes mellitus ([facility] )     Hypertension     Neuropathy in diabetes [facility])        Past Surgical History  Past Surgical History:   Procedure Laterality Date    AMPUTATION      FOOT SURGERY          Allergies:  Shellfish allergy    Medications:  Current Outpatient Medications   Medication Sig Dispense Refill    atorvastatin (LIPITOR) 20 mg tablet Take 20 mg by mouth daily      lisinopril (ZESTRIL) 20 mg tablet TAKE 1 TABLET BY MOUTH EVERY DAY IN THE AM      lisinopril (ZESTRIL) 30 mg tablet TAKE 1 TABLET (30 MG TOTAL) BY MOUTH DAILY  IN THE AM      metFORMIN (GLUCOPHAGE) 500 mg tablet Take 500 mg by mouth daily       No current facility-administered medications for this visit  Social History:  Social History     Socioeconomic History    Marital status: Single     Spouse name: None    Number of children: None    Years of education: None    Highest education level: None   Occupational History    None   Social Needs    Financial resource strain: None    Food insecurity     Worry: None     Inability: None    Transportation needs     Medical: None     Non-medical: None   Tobacco Use    Smoking status: Current Some Day Smoker     Types: Pipe    Smokeless tobacco: Never Used   Substance and Sexual Activity    Alcohol use: Yes     Alcohol/week: 1 0 standard drinks     Types: 1 Cans of beer per week     Frequency: 4 or more times a week     Drinks per session: 1 or 2    Drug use: Never    Sexual activity: None   Lifestyle    Physical activity     Days per week: None     Minutes per session: None    Stress: None   Relationships    Social connections     Talks on phone: None     Gets together: None     Attends Pentecostalism service: None     Active member of club or organization: None     Attends meetings of clubs or organizations: None     Relationship status: None    Intimate partner violence     Fear of current or ex partner: None     Emotionally abused: None     Physically abused: None     Forced sexual activity: None   Other Topics Concern    None   Social History Narrative    None          Review of Systems   Constitutional: Negative for chills and fever  HENT: Negative for sore throat  Respiratory: Negative for cough and shortness of breath  Cardiovascular: Negative for chest pain  Gastrointestinal: Negative for diarrhea, nausea and vomiting  Skin: Positive for wound  Neurological: Positive for numbness  Negative for weakness  Objective:     Physical Exam  Vitals signs reviewed     Constitutional: General: He is not in acute distress  Appearance: He is well-developed  Cardiovascular:      Rate and Rhythm: Normal rate and regular rhythm  Pulses:           Dorsalis pedis pulses are 1+ on the right side  Posterior tibial pulses are 1+ on the right side  Comments: No cyanosis  Pulmonary:      Effort: Pulmonary effort is normal  No respiratory distress  Breath sounds: Normal breath sounds  Musculoskeletal:         General: No tenderness  Right foot: Decreased range of motion (Right ankle equinus)  Comments: BKA left  TMA right  No acute joint inflammation or edema  Chronic right LE edema with venous stasis  No cellulitis RLE  Feet:      Right foot:      Skin integrity: Ulcer and dry skin present  No skin breakdown, erythema, warmth or callus  Skin:     Capillary Refill: Capillary refill takes less than 2 seconds  Findings: No erythema  Comments: Wound presents right TMA site  It measures 5 0 X 2 5 cm in total   Wound bed is 80% granular  He has antoher ulcer on right posterior ankle  Wound is stable  1 5 X 1 0 cm  Increased granular tissues  No signs of infection  No purulence  No fluctuation or crepitus  No signs of abscess  No significant redness or edema right foot or ankle  No deep probing on both wounds  Neurological:      Mental Status: He is alert and oriented to person, place, and time  Cranial Nerves: No cranial nerve deficit  Comments: Decreased protective sensation on right foot     Psychiatric:         Behavior: Behavior normal

## 2020-08-28 ENCOUNTER — PROCEDURE VISIT (OUTPATIENT)
Dept: PODIATRY | Facility: CLINIC | Age: 65
End: 2020-08-28
Payer: COMMERCIAL

## 2020-08-28 VITALS — WEIGHT: 188 LBS | TEMPERATURE: 97.6 F | HEIGHT: 64 IN | BODY MASS INDEX: 32.1 KG/M2

## 2020-08-28 DIAGNOSIS — L97.319 LOWER LIMB ULCER, ANKLE, RIGHT, WITH UNSPECIFIED SEVERITY (HCC): ICD-10-CM

## 2020-08-28 DIAGNOSIS — L97.519 ULCER OF RIGHT FOOT, UNSPECIFIED ULCER STAGE (HCC): Primary | ICD-10-CM

## 2020-08-28 DIAGNOSIS — E11.40 TYPE 2 DIABETES MELLITUS WITH DIABETIC NEUROPATHY, WITHOUT LONG-TERM CURRENT USE OF INSULIN (HCC): ICD-10-CM

## 2020-08-28 DIAGNOSIS — Z89.421 ABSENCE OF TOE OF RIGHT FOOT (HCC): ICD-10-CM

## 2020-08-28 PROCEDURE — 99213 OFFICE O/P EST LOW 20 MIN: CPT | Performed by: PODIATRIST

## 2020-08-28 NOTE — PROGRESS NOTES
PATIENT:  Rupa Zazueta  1955       ASSESSMENT:     1  Ulcer of right foot, unspecified ulcer stage ([facility] )     2  Lower limb ulcer, ankle, right, with unspecified severity ([place] [address] )     3  Type 2 diabetes mellitus with diabetic neuropathy, without long-term current use of insulin ([place] [address] )     4  Absence of toe of right foot ([place] [address] )             PLAN:  1  Patient was counseled and educated on the condition and the diagnosis  2  No significant improvement right TMA wound  Achilles tendon is exposed on ankle now  Periwound callus was removed  Wound was cleaned and redressed  3  Discussed options including in-patient treatment  He would think about it for now  Will re-evaluate him on Tuesday  Possible in-patient treatment next week  4  Instructed to notify us with any worsening of condition, redness, edema, signs of infection, increased drainage, or constitutional signs of infection  Subjective:     HPI  The patient presents for right foot and ankle ulcer  He feels well  No redness, edema, or purulence  BS is under control  He presents with walker and surgical shoe today  The following portions of the patient's history were reviewed and updated as appropriate: allergies, current medications, past family history, past medical history, past social history, past surgical history and problem list   All pertinent labs and images were reviewed        Past Medical History  Past Medical History:   Diagnosis Date    Diabetes mellitus ([place] [address] )     Hypertension     Neuropathy in diabetes ([place] [address] )        Past Surgical History  Past Surgical History:   Procedure Laterality Date    AMPUTATION      FOOT SURGERY          Allergies:  Shellfish allergy    Medications:  Current Outpatient Medications   Medication Sig Dispense Refill    atorvastatin (LIPITOR) 20 mg tablet Take 20 mg by mouth daily      lisinopril (ZESTRIL) 20 mg tablet TAKE 1 TABLET BY MOUTH EVERY DAY IN THE AM      lisinopril (ZESTRIL) 30 mg tablet TAKE 1 TABLET (30 MG TOTAL) BY MOUTH DAILY  IN THE AM      metFORMIN (GLUCOPHAGE) 500 mg tablet Take 500 mg by mouth daily       No current facility-administered medications for this visit  Social History:  Social History     Socioeconomic History    Marital status: Single     Spouse name: None    Number of children: None    Years of education: None    Highest education level: None   Occupational History    None   Social Needs    Financial resource strain: None    Food insecurity     Worry: None     Inability: None    Transportation needs     Medical: None     Non-medical: None   Tobacco Use    Smoking status: Current Some Day Smoker     Types: Pipe    Smokeless tobacco: Never Used   Substance and Sexual Activity    Alcohol use: Yes     Alcohol/week: 1 0 standard drinks     Types: 1 Cans of beer per week     Frequency: 4 or more times a week     Drinks per session: 1 or 2    Drug use: Never    Sexual activity: None   Lifestyle    Physical activity     Days per week: None     Minutes per session: None    Stress: None   Relationships    Social connections     Talks on phone: None     Gets together: None     Attends Yazidism service: None     Active member of club or organization: None     Attends meetings of clubs or organizations: None     Relationship status: None    Intimate partner violence     Fear of current or ex partner: None     Emotionally abused: None     Physically abused: None     Forced sexual activity: None   Other Topics Concern    None   Social History Narrative    None          Review of Systems   Constitutional: Negative for chills and fever  HENT: Negative for sore throat  Respiratory: Negative for cough and shortness of breath  Cardiovascular: Negative for chest pain  Gastrointestinal: Negative for diarrhea, nausea and vomiting  Skin: Positive for wound  Neurological: Positive for numbness  Negative for weakness  Objective:     Physical Exam  Vitals signs reviewed  Constitutional:       General: He is not in acute distress  Appearance: He is well-developed  Cardiovascular:      Rate and Rhythm: Normal rate and regular rhythm  Pulses:           Dorsalis pedis pulses are 1+ on the right side  Posterior tibial pulses are 1+ on the right side  Comments: No cyanosis  Pulmonary:      Effort: Pulmonary effort is normal  No respiratory distress  Breath sounds: Normal breath sounds  Musculoskeletal:         General: No tenderness  Right foot: Decreased range of motion (Right ankle equinus)  Comments: BKA left  TMA right  No acute joint inflammation or edema  Chronic right LE edema with venous stasis  No cellulitis RLE  Feet:      Right foot:      Skin integrity: Ulcer and dry skin present  No skin breakdown, erythema, warmth or callus  Skin:     Capillary Refill: Capillary refill takes less than 2 seconds  Findings: No erythema  Comments: Wound presents right TMA site  It measures 5 0 X 2 5 cm in total   Wound bed is 80% granular  He has antoher ulcer on right posterior ankle  1 5 X 1 0 cm  Exposed achilles tendon  No signs of active infection  No purulence  No fluctuation or crepitus  Neurological:      General: No focal deficit present  Mental Status: He is alert and oriented to person, place, and time  Cranial Nerves: No cranial nerve deficit  Comments: Decreased protective sensation on right foot     Psychiatric:         Behavior: Behavior normal

## 2020-09-01 ENCOUNTER — TELEPHONE (OUTPATIENT)
Dept: PODIATRY | Facility: CLINIC | Age: 65
End: 2020-09-01

## 2020-09-01 ENCOUNTER — PROCEDURE VISIT (OUTPATIENT)
Dept: PODIATRY | Facility: CLINIC | Age: 65
End: 2020-09-01
Payer: COMMERCIAL

## 2020-09-01 VITALS — WEIGHT: 188 LBS | BODY MASS INDEX: 32.1 KG/M2 | TEMPERATURE: 96.7 F | HEIGHT: 64 IN

## 2020-09-01 DIAGNOSIS — E11.40 TYPE 2 DIABETES MELLITUS WITH DIABETIC NEUROPATHY, WITHOUT LONG-TERM CURRENT USE OF INSULIN (HCC): ICD-10-CM

## 2020-09-01 DIAGNOSIS — L97.319 LOWER LIMB ULCER, ANKLE, RIGHT, WITH UNSPECIFIED SEVERITY (HCC): ICD-10-CM

## 2020-09-01 DIAGNOSIS — L97.519 ULCER OF RIGHT FOOT, UNSPECIFIED ULCER STAGE (HCC): Primary | ICD-10-CM

## 2020-09-01 PROCEDURE — 99213 OFFICE O/P EST LOW 20 MIN: CPT | Performed by: PODIATRIST

## 2020-09-01 RX ORDER — CEPHALEXIN 500 MG/1
500 CAPSULE ORAL EVERY 6 HOURS SCHEDULED
Qty: 28 CAPSULE | Refills: 0 | Status: SHIPPED | OUTPATIENT
Start: 2020-09-01 | End: 2020-09-15 | Stop reason: HOSPADM

## 2020-09-01 NOTE — PROGRESS NOTES
PATIENT:  Salome Szymanski  1955       ASSESSMENT:     1  Ulcer of right foot, unspecified ulcer stage ([facility])  cephalexin (KEFLEX) 500 mg capsule   2  Lower limb ulcer, ankle, right, with unspecified severity ([facility] )     3  Type 2 diabetes mellitus with diabetic neuropathy, without long-term current use of insulin ([facility] )             PLAN:  1  Patient was counseled and educated on the condition and the diagnosis  No significant improvement  Periwound callus was removed  Wound was cleaned and redressed  Continue Dakin solution  Discussed proper off-loading and staying off of his feet  2  Discussed in-patient treatment  He is still not amenable for in-patient treatment  Put him on Keflex  3  Instructed to notify us with any worsening of condition, redness, edema, signs of infection, increased drainage, or constitutional signs of infection  RA in 1 week  Subjective:     HPI  The patient presents for right foot and ankle ulcer  No significant foot pain  No redness or edema  BS is under control  He presents with walker and surgical shoe today  The following portions of the patient's history were reviewed and updated as appropriate: allergies, current medications, past family history, past medical history, past social history, past surgical history and problem list   All pertinent labs and images were reviewed        Past Medical History  Past Medical History:   Diagnosis Date    [facility] )     Hypertension     Neuropathy in diabetes [facility])        Past Surgical History  Past Surgical History:   Procedure Laterality Date    AMPUTATION      FOOT SURGERY          Allergies:  Shellfish allergy    Medications:  Current Outpatient Medications   Medication Sig Dispense Refill    atorvastatin (LIPITOR) 20 mg tablet Take 20 mg by mouth daily      lisinopril (ZESTRIL) 20 mg tablet TAKE 1 TABLET BY MOUTH EVERY DAY IN THE AM      lisinopril (ZESTRIL) 30 mg tablet TAKE 1 TABLET (30 MG TOTAL) BY MOUTH DAILY  IN THE AM      metFORMIN (GLUCOPHAGE) 500 mg tablet Take 500 mg by mouth daily      cephalexin (KEFLEX) 500 mg capsule Take 1 capsule (500 mg total) by mouth every 6 (six) hours for 7 days 28 capsule 0     No current facility-administered medications for this visit  Social History:  Social History     Socioeconomic History    Marital status: Single     Spouse name: None    Number of children: None    Years of education: None    Highest education level: None   Occupational History    None   Social Needs    Financial resource strain: None    Food insecurity     Worry: None     Inability: None    Transportation needs     Medical: None     Non-medical: None   Tobacco Use    Smoking status: Current Some Day Smoker     Types: Pipe    Smokeless tobacco: Never Used   Substance and Sexual Activity    Alcohol use: Yes     Alcohol/week: 1 0 standard drinks     Types: 1 Cans of beer per week     Frequency: 4 or more times a week     Drinks per session: 1 or 2    Drug use: Never    Sexual activity: None   Lifestyle    Physical activity     Days per week: None     Minutes per session: None    Stress: None   Relationships    Social connections     Talks on phone: None     Gets together: None     Attends Confucianism service: None     Active member of club or organization: None     Attends meetings of clubs or organizations: None     Relationship status: None    Intimate partner violence     Fear of current or ex partner: None     Emotionally abused: None     Physically abused: None     Forced sexual activity: None   Other Topics Concern    None   Social History Narrative    None          Review of Systems   Constitutional: Negative for chills and fever  HENT: Negative for sore throat  Respiratory: Negative for cough and shortness of breath  Cardiovascular: Negative for chest pain  Gastrointestinal: Negative for diarrhea, nausea and vomiting     Skin: Positive for wound  Neurological: Positive for numbness  Negative for weakness  Objective:     Physical Exam  Vitals signs reviewed  Constitutional:       General: He is not in acute distress  Appearance: He is well-developed  Cardiovascular:      Rate and Rhythm: Normal rate and regular rhythm  Pulses:           Dorsalis pedis pulses are 1+ on the right side  Posterior tibial pulses are 1+ on the right side  Comments: No cyanosis  Pulmonary:      Effort: Pulmonary effort is normal  No respiratory distress  Breath sounds: Normal breath sounds  Musculoskeletal:         General: No tenderness  Right foot: Decreased range of motion (Right ankle equinus)  Comments: BKA left  TMA right  No acute joint inflammation or edema  Chronic right LE edema with venous stasis  No cellulitis RLE  Feet:      Right foot:      Skin integrity: Ulcer and dry skin present  No skin breakdown, erythema, warmth or callus  Skin:     Capillary Refill: Capillary refill takes less than 2 seconds  Findings: No erythema  Comments: Wound presents right TMA site  It measures 5 0 X 2 5 cm in total   Wound bed is 80% granular  He has antoher ulcer on right posterior ankle  1 5 X 1 0 cm  Exposed achilles tendon  No redness  No purulence  No fluctuation or crepitus  Neurological:      General: No focal deficit present  Mental Status: He is alert and oriented to person, place, and time  Cranial Nerves: No cranial nerve deficit  Sensory: Sensory deficit present  Comments: Decreased protective sensation on right foot     Psychiatric:         Behavior: Behavior normal

## 2020-09-08 ENCOUNTER — PROCEDURE VISIT (OUTPATIENT)
Dept: PODIATRY | Facility: CLINIC | Age: 65
End: 2020-09-08
Payer: COMMERCIAL

## 2020-09-08 VITALS
BODY MASS INDEX: 32.27 KG/M2 | HEART RATE: 90 BPM | SYSTOLIC BLOOD PRESSURE: 192 MMHG | TEMPERATURE: 99.1 F | HEIGHT: 64 IN | DIASTOLIC BLOOD PRESSURE: 84 MMHG

## 2020-09-08 DIAGNOSIS — L97.319 LOWER LIMB ULCER, ANKLE, RIGHT, WITH UNSPECIFIED SEVERITY (HCC): ICD-10-CM

## 2020-09-08 DIAGNOSIS — L97.519 ULCER OF RIGHT FOOT, UNSPECIFIED ULCER STAGE (HCC): Primary | ICD-10-CM

## 2020-09-08 DIAGNOSIS — E11.40 TYPE 2 DIABETES MELLITUS WITH DIABETIC NEUROPATHY, WITHOUT LONG-TERM CURRENT USE OF INSULIN (HCC): ICD-10-CM

## 2020-09-08 PROCEDURE — 99213 OFFICE O/P EST LOW 20 MIN: CPT | Performed by: PODIATRIST

## 2020-09-08 RX ORDER — HYOSCYAMINE SULFATE 16 OZ
SOLUTION MISCELLANEOUS
COMMUNITY
Start: 2020-08-14

## 2020-09-08 NOTE — PROGRESS NOTES
PATIENT:  Gian Queen  1955       ASSESSMENT:     1  Ulcer of right foot, unspecified ulcer stage ([facility] [address] )     2  Lower limb ulcer, ankle, right, with unspecified severity ([facility] )     3  Type 2 diabetes mellitus with diabetic neuropathy, without long-term current use of insulin ([facility] )             PLAN:  1  Patient was counseled and educated on the condition and the diagnosis  No significant improvement  Wound was cleaned and redressed  Continue Dakin solution / Silver alginate  2  He is agreeable to in-patient care now  Will admit him tomorrow  He is no amenable to go in today  Will need further debridement in the OR / possible biologics  3  Instructed to notify us with any worsening of condition, redness, edema, signs of infection, increased drainage, or constitutional signs of infection  Subjective:     HPI  The patient presents for right foot and ankle ulcer  No significant foot pain  No redness or edema  BS is under control  He presents with walker and surgical shoe today  No new complaint  The following portions of the patient's history were reviewed and updated as appropriate: allergies, current medications, past family history, past medical history, past social history, past surgical history and problem list   All pertinent labs and images were reviewed        Past Medical History  Past Medical History:   Diagnosis Date    Diabetes mellitus ([facility] )     Hypertension     Neuropathy in diabetes [facility])        Past Surgical History  Past Surgical History:   Procedure Laterality Date    AMPUTATION      FOOT SURGERY          Allergies:  Shellfish allergy    Medications:  Current Outpatient Medications   Medication Sig Dispense Refill    atorvastatin (LIPITOR) 20 mg tablet Take 20 mg by mouth daily      cephalexin (KEFLEX) 500 mg capsule Take 1 capsule (500 mg total) by mouth every 6 (six) hours for 7 days 28 capsule 0    lisinopril (ZESTRIL) 20 mg tablet TAKE 1 TABLET BY MOUTH EVERY DAY IN THE AM      lisinopril (ZESTRIL) 30 mg tablet TAKE 1 TABLET (30 MG TOTAL) BY MOUTH DAILY  IN THE AM      metFORMIN (GLUCOPHAGE) 500 mg tablet Take 500 mg by mouth daily      sodium hypochlorite 0 25 percent APPLY 1 APPLICATION TOPICALLY ONCE FOR 1 DOSE       No current facility-administered medications for this visit  Social History:  Social History     Socioeconomic History    Marital status: Single     Spouse name: None    Number of children: None    Years of education: None    Highest education level: None   Occupational History    None   Social Needs    Financial resource strain: None    Food insecurity     Worry: None     Inability: None    Transportation needs     Medical: None     Non-medical: None   Tobacco Use    Smoking status: Current Some Day Smoker     Types: Pipe    Smokeless tobacco: Never Used   Substance and Sexual Activity    Alcohol use: Yes     Alcohol/week: 1 0 standard drinks     Types: 1 Cans of beer per week     Frequency: 4 or more times a week     Drinks per session: 1 or 2    Drug use: Never    Sexual activity: None   Lifestyle    Physical activity     Days per week: None     Minutes per session: None    Stress: None   Relationships    Social connections     Talks on phone: None     Gets together: None     Attends Congregation service: None     Active member of club or organization: None     Attends meetings of clubs or organizations: None     Relationship status: None    Intimate partner violence     Fear of current or ex partner: None     Emotionally abused: None     Physically abused: None     Forced sexual activity: None   Other Topics Concern    None   Social History Narrative    None          Review of Systems   Constitutional: Negative for chills and fever  HENT: Negative for sore throat  Respiratory: Negative for cough and shortness of breath  Cardiovascular: Negative for chest pain     Gastrointestinal: Negative for diarrhea, nausea and vomiting  Skin: Positive for wound  Neurological: Positive for numbness  Negative for weakness  Objective:     Physical Exam  Vitals signs reviewed  Constitutional:       General: He is not in acute distress  Appearance: He is well-developed  Cardiovascular:      Rate and Rhythm: Normal rate and regular rhythm  Pulses:           Dorsalis pedis pulses are 1+ on the right side  Posterior tibial pulses are 1+ on the right side  Comments: No cyanosis  Pulmonary:      Effort: Pulmonary effort is normal  No respiratory distress  Breath sounds: Normal breath sounds  Musculoskeletal:         General: No tenderness  Right foot: Decreased range of motion (Right ankle equinus)  Comments: BKA left  TMA right  No acute joint inflammation or edema  Feet:      Right foot:      Skin integrity: Ulcer and dry skin present  No skin breakdown, erythema, warmth or callus  Skin:     Capillary Refill: Capillary refill takes less than 2 seconds  Findings: No erythema  Comments: Wound presents right TMA site  It measures 5 0 X 2 5 cm in total   Wound bed is 80% granular  He has antoher ulcer on right posterior ankle  1 5 X 1 0 cm  Exposed achilles tendon  No redness  No purulence  No fluctuation or crepitus  No significant improvement from the last 2 - 3 visits  Neurological:      General: No focal deficit present  Mental Status: He is alert and oriented to person, place, and time  Cranial Nerves: No cranial nerve deficit  Sensory: Sensory deficit present  Comments: Decreased protective sensation on right foot     Psychiatric:         Behavior: Behavior normal

## 2020-09-09 ENCOUNTER — HOSPITAL ENCOUNTER (INPATIENT)
Facility: HOSPITAL | Age: 65
LOS: 6 days | Discharge: HOME HEALTH | DRG: 623 | End: 2020-09-15
Attending: PODIATRIST | Admitting: PODIATRIST
Payer: COMMERCIAL

## 2020-09-09 ENCOUNTER — APPOINTMENT (INPATIENT)
Dept: RADIOLOGY | Facility: HOSPITAL | Age: 65
DRG: 623 | End: 2020-09-09
Payer: COMMERCIAL

## 2020-09-09 DIAGNOSIS — L97.513 ULCER OF RIGHT FOOT WITH NECROSIS OF MUSCLE (HCC): Primary | ICD-10-CM

## 2020-09-09 DIAGNOSIS — IMO0002 TYPE 2 DIABETES MELLITUS, UNCONTROLLED, WITH NEUROPATHY: ICD-10-CM

## 2020-09-09 DIAGNOSIS — I10 HYPERTENSION, ESSENTIAL: ICD-10-CM

## 2020-09-09 DIAGNOSIS — E11.40 TYPE 2 DIABETES MELLITUS WITH DIABETIC NEUROPATHY, WITHOUT LONG-TERM CURRENT USE OF INSULIN (HCC): ICD-10-CM

## 2020-09-09 DIAGNOSIS — E11.49 DM (DIABETES MELLITUS), TYPE 2 WITH NEUROLOGICAL COMPLICATIONS (HCC): ICD-10-CM

## 2020-09-09 DIAGNOSIS — L97.319 LOWER LIMB ULCER, ANKLE, RIGHT, WITH UNSPECIFIED SEVERITY (HCC): ICD-10-CM

## 2020-09-09 DIAGNOSIS — L97.513 RIGHT FOOT ULCER, WITH NECROSIS OF MUSCLE (HCC): ICD-10-CM

## 2020-09-09 DIAGNOSIS — L97.519 ULCER OF RIGHT FOOT, UNSPECIFIED ULCER STAGE (HCC): Primary | ICD-10-CM

## 2020-09-09 DIAGNOSIS — L03.115 CELLULITIS OF RIGHT FOOT: ICD-10-CM

## 2020-09-09 DIAGNOSIS — L97.412 DIABETIC ULCER OF RIGHT MIDFOOT ASSOCIATED WITH TYPE 2 DIABETES MELLITUS, WITH FAT LAYER EXPOSED (HCC): ICD-10-CM

## 2020-09-09 DIAGNOSIS — Z98.890 POST-OPERATIVE STATE: ICD-10-CM

## 2020-09-09 DIAGNOSIS — E11.621 DIABETIC ULCER OF RIGHT MIDFOOT ASSOCIATED WITH TYPE 2 DIABETES MELLITUS, WITH FAT LAYER EXPOSED (HCC): ICD-10-CM

## 2020-09-09 DIAGNOSIS — L97.412: ICD-10-CM

## 2020-09-09 PROBLEM — K21.9 GERD (GASTROESOPHAGEAL REFLUX DISEASE): Status: ACTIVE | Noted: 2020-09-09

## 2020-09-09 PROBLEM — F17.290 PIPE SMOKER: Status: ACTIVE | Noted: 2020-07-06

## 2020-09-09 PROBLEM — E78.5 DYSLIPIDEMIA: Status: ACTIVE | Noted: 2018-12-05

## 2020-09-09 PROBLEM — R80.9 ALBUMINURIA: Status: ACTIVE | Noted: 2018-12-05

## 2020-09-09 PROBLEM — Z89.512 HISTORY OF AMPUTATION OF LEFT LOWER EXTREMITY THROUGH TIBIA AND FIBULA (HCC): Status: ACTIVE | Noted: 2018-11-11

## 2020-09-09 PROBLEM — Z89.431 HISTORY OF AMPUTATION OF RIGHT FOOT THROUGH METATARSAL BONE (HCC): Status: ACTIVE | Noted: 2020-07-06

## 2020-09-09 PROBLEM — I87.2 VENOUS INSUFFICIENCY: Status: ACTIVE | Noted: 2018-11-20

## 2020-09-09 PROBLEM — F17.290 CIGAR SMOKER: Status: ACTIVE | Noted: 2020-07-06

## 2020-09-09 LAB
ALBUMIN SERPL BCP-MCNC: 3.6 G/DL (ref 3.5–5)
ALP SERPL-CCNC: 88 U/L (ref 46–116)
ALT SERPL W P-5'-P-CCNC: 25 U/L (ref 12–78)
ANION GAP SERPL CALCULATED.3IONS-SCNC: 6 MMOL/L (ref 4–13)
AST SERPL W P-5'-P-CCNC: 18 U/L (ref 5–45)
BASOPHILS # BLD AUTO: 0.06 THOUSANDS/ΜL (ref 0–0.1)
BASOPHILS NFR BLD AUTO: 1 % (ref 0–1)
BILIRUB SERPL-MCNC: 1.02 MG/DL (ref 0.2–1)
BUN SERPL-MCNC: 24 MG/DL (ref 5–25)
CALCIUM SERPL-MCNC: 9.6 MG/DL (ref 8.3–10.1)
CHLORIDE SERPL-SCNC: 106 MMOL/L (ref 100–108)
CO2 SERPL-SCNC: 27 MMOL/L (ref 21–32)
CREAT SERPL-MCNC: 1.11 MG/DL (ref 0.6–1.3)
CRP SERPL QL: 14.8 MG/L
EOSINOPHIL # BLD AUTO: 0.15 THOUSAND/ΜL (ref 0–0.61)
EOSINOPHIL NFR BLD AUTO: 2 % (ref 0–6)
ERYTHROCYTE [DISTWIDTH] IN BLOOD BY AUTOMATED COUNT: 12.7 % (ref 11.6–15.1)
ERYTHROCYTE [SEDIMENTATION RATE] IN BLOOD: 47 MM/HOUR (ref 0–19)
GFR SERPL CREATININE-BSD FRML MDRD: 70 ML/MIN/1.73SQ M
GLUCOSE SERPL-MCNC: 114 MG/DL (ref 65–140)
GLUCOSE SERPL-MCNC: 128 MG/DL (ref 65–140)
GLUCOSE SERPL-MCNC: 146 MG/DL (ref 65–140)
HCT VFR BLD AUTO: 41.4 % (ref 36.5–49.3)
HGB BLD-MCNC: 14.5 G/DL (ref 12–17)
IMM GRANULOCYTES # BLD AUTO: 0.06 THOUSAND/UL (ref 0–0.2)
IMM GRANULOCYTES NFR BLD AUTO: 1 % (ref 0–2)
LYMPHOCYTES # BLD AUTO: 1.4 THOUSANDS/ΜL (ref 0.6–4.47)
LYMPHOCYTES NFR BLD AUTO: 16 % (ref 14–44)
MCH RBC QN AUTO: 33.5 PG (ref 26.8–34.3)
MCHC RBC AUTO-ENTMCNC: 35 G/DL (ref 31.4–37.4)
MCV RBC AUTO: 96 FL (ref 82–98)
MONOCYTES # BLD AUTO: 0.89 THOUSAND/ΜL (ref 0.17–1.22)
MONOCYTES NFR BLD AUTO: 10 % (ref 4–12)
NEUTROPHILS # BLD AUTO: 6.02 THOUSANDS/ΜL (ref 1.85–7.62)
NEUTS SEG NFR BLD AUTO: 70 % (ref 43–75)
NRBC BLD AUTO-RTO: 0 /100 WBCS
PLATELET # BLD AUTO: 227 THOUSANDS/UL (ref 149–390)
PMV BLD AUTO: 9.3 FL (ref 8.9–12.7)
POTASSIUM SERPL-SCNC: 4.3 MMOL/L (ref 3.5–5.3)
PROT SERPL-MCNC: 7.4 G/DL (ref 6.4–8.2)
RBC # BLD AUTO: 4.33 MILLION/UL (ref 3.88–5.62)
SODIUM SERPL-SCNC: 139 MMOL/L (ref 136–145)
WBC # BLD AUTO: 8.58 THOUSAND/UL (ref 4.31–10.16)

## 2020-09-09 PROCEDURE — 73630 X-RAY EXAM OF FOOT: CPT

## 2020-09-09 PROCEDURE — 86140 C-REACTIVE PROTEIN: CPT | Performed by: STUDENT IN AN ORGANIZED HEALTH CARE EDUCATION/TRAINING PROGRAM

## 2020-09-09 PROCEDURE — 87070 CULTURE OTHR SPECIMN AEROBIC: CPT | Performed by: STUDENT IN AN ORGANIZED HEALTH CARE EDUCATION/TRAINING PROGRAM

## 2020-09-09 PROCEDURE — 71046 X-RAY EXAM CHEST 2 VIEWS: CPT

## 2020-09-09 PROCEDURE — 82948 REAGENT STRIP/BLOOD GLUCOSE: CPT

## 2020-09-09 PROCEDURE — 0QBN0ZZ EXCISION OF RIGHT METATARSAL, OPEN APPROACH: ICD-10-PCS | Performed by: PODIATRIST

## 2020-09-09 PROCEDURE — 85652 RBC SED RATE AUTOMATED: CPT | Performed by: STUDENT IN AN ORGANIZED HEALTH CARE EDUCATION/TRAINING PROGRAM

## 2020-09-09 PROCEDURE — 87147 CULTURE TYPE IMMUNOLOGIC: CPT | Performed by: STUDENT IN AN ORGANIZED HEALTH CARE EDUCATION/TRAINING PROGRAM

## 2020-09-09 PROCEDURE — 87075 CULTR BACTERIA EXCEPT BLOOD: CPT | Performed by: STUDENT IN AN ORGANIZED HEALTH CARE EDUCATION/TRAINING PROGRAM

## 2020-09-09 PROCEDURE — 99222 1ST HOSP IP/OBS MODERATE 55: CPT | Performed by: PODIATRIST

## 2020-09-09 PROCEDURE — 87205 SMEAR GRAM STAIN: CPT | Performed by: STUDENT IN AN ORGANIZED HEALTH CARE EDUCATION/TRAINING PROGRAM

## 2020-09-09 PROCEDURE — 80053 COMPREHEN METABOLIC PANEL: CPT | Performed by: STUDENT IN AN ORGANIZED HEALTH CARE EDUCATION/TRAINING PROGRAM

## 2020-09-09 PROCEDURE — 87077 CULTURE AEROBIC IDENTIFY: CPT | Performed by: STUDENT IN AN ORGANIZED HEALTH CARE EDUCATION/TRAINING PROGRAM

## 2020-09-09 PROCEDURE — 87040 BLOOD CULTURE FOR BACTERIA: CPT | Performed by: STUDENT IN AN ORGANIZED HEALTH CARE EDUCATION/TRAINING PROGRAM

## 2020-09-09 PROCEDURE — 85025 COMPLETE CBC W/AUTO DIFF WBC: CPT | Performed by: STUDENT IN AN ORGANIZED HEALTH CARE EDUCATION/TRAINING PROGRAM

## 2020-09-09 PROCEDURE — 87186 SC STD MICRODIL/AGAR DIL: CPT | Performed by: STUDENT IN AN ORGANIZED HEALTH CARE EDUCATION/TRAINING PROGRAM

## 2020-09-09 PROCEDURE — 11042 DBRDMT SUBQ TIS 1ST 20SQCM/<: CPT | Performed by: PODIATRIST

## 2020-09-09 RX ORDER — NICOTINE 21 MG/24HR
1 PATCH, TRANSDERMAL 24 HOURS TRANSDERMAL DAILY
Status: DISCONTINUED | OUTPATIENT
Start: 2020-09-10 | End: 2020-09-15 | Stop reason: HOSPADM

## 2020-09-09 RX ORDER — ACETAMINOPHEN 325 MG/1
650 TABLET ORAL EVERY 6 HOURS PRN
Status: DISCONTINUED | OUTPATIENT
Start: 2020-09-09 | End: 2020-09-15 | Stop reason: HOSPADM

## 2020-09-09 RX ORDER — ATORVASTATIN CALCIUM 20 MG/1
20 TABLET, FILM COATED ORAL DAILY
Status: DISCONTINUED | OUTPATIENT
Start: 2020-09-10 | End: 2020-09-15 | Stop reason: HOSPADM

## 2020-09-09 RX ORDER — METRONIDAZOLE 500 MG/1
500 TABLET ORAL EVERY 8 HOURS SCHEDULED
Status: DISCONTINUED | OUTPATIENT
Start: 2020-09-09 | End: 2020-09-15 | Stop reason: HOSPADM

## 2020-09-09 RX ORDER — SODIUM HYPOCHLORITE 5 MG/ML
1 SOLUTION TOPICAL DAILY
Status: DISCONTINUED | OUTPATIENT
Start: 2020-09-10 | End: 2020-09-14

## 2020-09-09 RX ORDER — HEPARIN SODIUM 5000 [USP'U]/ML
5000 INJECTION, SOLUTION INTRAVENOUS; SUBCUTANEOUS EVERY 8 HOURS SCHEDULED
Status: DISCONTINUED | OUTPATIENT
Start: 2020-09-09 | End: 2020-09-15 | Stop reason: HOSPADM

## 2020-09-09 RX ORDER — CEFAZOLIN SODIUM 2 G/50ML
2000 SOLUTION INTRAVENOUS EVERY 8 HOURS
Status: DISCONTINUED | OUTPATIENT
Start: 2020-09-09 | End: 2020-09-11

## 2020-09-09 RX ADMIN — HEPARIN SODIUM 5000 UNITS: 5000 INJECTION INTRAVENOUS; SUBCUTANEOUS at 22:20

## 2020-09-09 RX ADMIN — CEFAZOLIN SODIUM 2000 MG: 2 SOLUTION INTRAVENOUS at 20:17

## 2020-09-09 RX ADMIN — METRONIDAZOLE 500 MG: 500 TABLET ORAL at 22:20

## 2020-09-09 NOTE — PLAN OF CARE
Problem: Potential for Falls  Goal: Patient will remain free of falls  Description: INTERVENTIONS:  - Assess patient frequently for physical needs  -  Identify cognitive and physical deficits and behaviors that affect risk of falls    -  [place] fall precautions as indicated by assessment   - Educate patient/family on patient safety including physical limitations  - Instruct patient to call for assistance with activity based on assessment  - Modify environment to reduce risk of injury  - Consider OT/PT consult to assist with strengthening/mobility  Outcome: Progressing     Problem: PAIN - ADULT  Goal: Verbalizes/displays adequate comfort level or baseline comfort level  Description: Interventions:  - Encourage patient to monitor pain and request assistance  - Assess pain using appropriate pain scale  - Administer analgesics based on type and severity of pain and evaluate response  - Implement non-pharmacological measures as appropriate and evaluate response  - Consider cultural and social influences on pain and pain management  - Notify physician/advanced practitioner if interventions unsuccessful or patient reports new pain  Outcome: Progressing     Problem: INFECTION - ADULT  Goal: Absence or prevention of progression during hospitalization  Description: INTERVENTIONS:  - Assess and monitor for signs and symptoms of infection  - Monitor lab/diagnostic results  - Monitor all insertion sites, i e  indwelling lines, tubes, and drains  - Monitor endotracheal if appropriate and nasal secretions for changes in amount and color  - [place] appropriate cooling/warming therapies per order  - Administer medications as ordered  - Instruct and encourage patient and family to use good hand hygiene technique  - Identify and instruct in appropriate isolation precautions for identified infection/condition  Outcome: Progressing  Goal: Absence of fever/infection during neutropenic period  Description: INTERVENTIONS:  - Monitor WBC    Outcome: Progressing     Problem: SAFETY ADULT  Goal: Patient will remain free of falls  Description: INTERVENTIONS:  - Assess patient frequently for physical needs  -  Identify cognitive and physical deficits and behaviors that affect risk of falls    -  [place] fall precautions as indicated by assessment   - Educate patient/family on patient safety including physical limitations  - Instruct patient to call for assistance with activity based on assessment  - Modify environment to reduce risk of injury  - Consider OT/PT consult to assist with strengthening/mobility  Outcome: Progressing  Goal: Maintain or return to baseline ADL function  Description: INTERVENTIONS:  -  Assess patient's ability to carry out ADLs; assess patient's baseline for ADL function and identify physical deficits which impact ability to perform ADLs (bathing, care of mouth/teeth, toileting, grooming, dressing, etc )  - Assess/evaluate cause of self-care deficits   - Assess range of motion  - Assess patient's mobility; develop plan if impaired  - Assess patient's need for assistive devices and provide as appropriate  - Encourage maximum independence but intervene and supervise when necessary  - Involve family in performance of ADLs  - Assess for home care needs following discharge   - Consider OT consult to assist with ADL evaluation and planning for discharge  - Provide patient education as appropriate  Outcome: Progressing  Goal: Maintain or return mobility status to optimal level  Description: INTERVENTIONS:  - Assess patient's baseline mobility status (ambulation, transfers, stairs, etc )    - Identify cognitive and physical deficits and behaviors that affect mobility  - Identify mobility aids required to assist with transfers and/or ambulation (gait belt, sit-to-stand, lift, walker, cane, etc )  - [place] fall precautions as indicated by assessment  - Record patient progress and toleration of activity level on Mobility SBAR; progress patient to next Phase/Stage  - Instruct patient to call for assistance with activity based on assessment  - Consider rehabilitation consult to assist with strengthening/weightbearing, etc   Outcome: Progressing     Problem: DISCHARGE PLANNING  Goal: Discharge to home or other facility with appropriate resources  Description: INTERVENTIONS:  - Identify barriers to discharge w/patient and caregiver  - Arrange for needed discharge resources and transportation as appropriate  - Identify discharge learning needs (meds, wound care, etc )  - Arrange for interpretive services to assist at discharge as needed  - Refer to Case Management Department for coordinating discharge planning if the patient needs post-hospital services based on physician/advanced practitioner order or complex needs related to functional status, cognitive ability, or social support system  Outcome: Progressing     Problem: Knowledge Deficit  Goal: Patient/family/caregiver demonstrates understanding of disease process, treatment plan, medications, and discharge instructions  Description: Complete learning assessment and assess knowledge base    Interventions:  - Provide teaching at level of understanding  - Provide teaching via preferred learning methods  Outcome: Progressing

## 2020-09-09 NOTE — H&P
[address] [facility] - H&P  Vignesh Hartmann 63 y o  male MRN: [id number]  Unit/Bed#: [facility] 827-01 Encounter: [id number]  Admission Date: 09/09/20    ASSESSMENT:    Vignesh Hartmann is a 63 y o  male with:    1  Right lateral foot ulceration - Almanzar 3 - POA  2  Right distal posterior leg ulceration - Almanzar 1 - POA  3  Type 2 diabetes with peripheral neuropathy  4  History of left leg BKA  5  History of right foot TMA  6  Obesity    PLAN:    · Patient was evaluated at bedside  Patient's right foot wound was debrided as described below  Post debridement, wound to the patient's right lateral foot was dressed with Maxorb, DSD  Patient is wound to right posterior leg, overlying the Achilles tendon, was dressed with Maxorb, DSD  Patient's foot was then wrapped with an Ace bandage  · X-ray order to patient's right foot to evaluate potential underlying osteomyelitis  Patient started on IV Ancef, PO Flagyl  Wound culture as well as anaerobic culture taken and pending  · Blood culture x2, CBC with differential, CMP, ESR, CRP - pending  · Infectious Disease consulted appreciated for future antibiotic recommendations  · PT/OT consult appreciated to assist patient in nonweightbearing to his right lower extremity with surgical shoe  · Internal Medicine consult appreciated for recommendations on advanced medical care  · VTE prophylaxis consisting of heparin and SCD pumps  ·  Will discuss this plan with my attending and update as needed  Wound debridement note: After verbal consent was obtained, wound located at right distal lateral forefoot was excisionally debrided with a #15 blade of all nonviable, devitalized, necrotic, sloughy, fibrous, hyperkeratotic tissue w/ excision of subcutaneous tissue to a depth of periosteum  Post debridement wound measurements 4 5 x 3 0 x 0 7 cm, with appearance of wound fresh bleeding tissue, granular tissue with viable 70 % vs nonviable 30 %, <20sq cm debrided        Disposition: Patient requires >2 midnight stay for IV antibiotics and evaluation of right foot ulceration  Code Status: Level 1 - Full Code      SUBJECTIVE    History of Present Illness:    Jemima Almanzar is a 63 y o  male who presents with an ulceration to his right lateral foot, as well as an ulceration to his right posterior leg overlying the Achilles tendon  The patient states that the ulceration to his right lateral forefoot began approximately 2 months ago  He states that he believes this ulceration began from him walking differently because he had a different blister on his foot    He denies any pain to the area, however he does endorse peripheral neuropathy to the level of the ankle  He states that he has been seeing Dr Williams Beyer as an outpatient for local wound care  The patient states the home he has been using Dakin solution, DSD to dress his wound  The patient states that the wound to his right posterior leg, overlying his Achilles tendon began about 1 month ago  He believes that this was from rubbing against his shoes  Patient also states that he took his temperature earlier today which showed that he did not have a fever  Patient denies nausea, vomiting, chest pain, shortness of breath, chills, fever  Review of Systems:    Constitutional: Negative  HENT: Negative  Eyes: Negative  Respiratory: Negative  Cardiovascular: Negative  Gastrointestinal: Negative  Musculoskeletal:  History left leg BKA, history right foot TMA  Skin:  Ulceration right distal lateral forefoot, ulceration right distal Achilles tendon   Neurological:  Peripheral neuropathy  Psych: Negative       Past Medical and Surgical History:     Past Medical History:   Diagnosis Date    Diabetes mellitus ([facility] [address] )     Hypertension     Neuropathy in diabetes [facility])        Past Surgical History:   Procedure Laterality Date    AMPUTATION      FOOT SURGERY         Meds/Allergies:    Medications Prior to Admission   Medication    atorvastatin (LIPITOR) 20 mg tablet    lisinopril (ZESTRIL) 30 mg tablet    metFORMIN (GLUCOPHAGE) 500 mg tablet    sodium hypochlorite 0 25 percent    [] cephalexin (KEFLEX) 500 mg capsule    lisinopril (ZESTRIL) 20 mg tablet       Allergies   Allergen Reactions    Shellfish Allergy Itching     Dial body wash/Patients states he does not have a shellfish allergy       Social History:    Social History     Marital Status: Single    Substance Use History:   Social History     Substance and Sexual Activity   Alcohol Use Yes    Alcohol/week: 1 0 standard drinks    Types: 1 Cans of beer per week    Frequency: 4 or more times a week    Drinks per session: 1 or 2     Social History     Tobacco Use   Smoking Status Current Some Day Smoker    Types: Pipe   Smokeless Tobacco Never Used     Social History     Substance and Sexual Activity   Drug Use Never       Family History:    No family history on file  OBJECTIVE:    First Vitals:   Blood Pressure: 170/73 (20 1800)  Pulse: 105 (20 1800)  Temperature: 98 9 °F (37 2 °C) (20 1800)  Temp Source: Oral (20 1800)  Respirations: 18 (20 1800)  SpO2: 99 % (20 1800)    Current Vitals:   Blood Pressure: 170/73 (20 1800)  Pulse: 105 (20 1800)  Temperature: 98 9 °F (37 2 °C) (20 1800)  Temp Source: Oral (20 1800)  Respirations: 18 (20 1800)  SpO2: 99 % (20 1800)      Physical Exam:    General Appearance: Alert, cooperative, no distress  HEENT: Head normocephalic, atraumatic, without obvious abnormality  Heart: Normal rate and rhythm  Lungs: Non-labored breathing  No respiratory distress  Abdomen: Without distension  Psychiatric: AAOx3  Lower Extremity:  Vascular:   Pedal pulses on the right are present  CRT brisk at the digits  +1/4 edema noted at the right lower extremity  Pedal hair is absent   Skin temperature is WNL to the right foot    Musculoskeletal:  MMT is 5/5 in all muscle compartments of the right ankle  ROM at the ankle joint is decreased at the right ankle with the leg extended  No Pain on palpation of either the patient's wound, nor the periwound skin  History of left BKA noted, history of right TMA noted  Patient's right TMA site is completely healed and free from signs of breakdown  Dermatological:  Lower extremity wounds as noted below:    Wound #: 1  Location:  Right distal lateral forefoot  Length 4 5 cm: Width 3 0 cm: Depth 0 6 cm:   Deepest Tissue Noted in Base:  Periosteum  Probe to Bone: No  Peripheral Skin Description: non-attached, hyperkeratotic  Undermining:  Slight undermining noted along the distal and plantar aspects of the surrounding hyperkeratotic skin  Granulation:  70 % Fibrotic Tissue:  30 % Necrotic Tissue:  0 %   Drainage Amount: minimal, serosanguinous, bloody, purulence  Signs of Infection: Yes - mild purulence observed, edema  Total debrided <20 square centimeters          Wound #: 2  Location:  Right distal posterior leg, overlying Achilles tendon  Length 0 9 cm: Width 0 8 cm: Depth 0 2 cm:   Deepest Tissue Noted in Base:  Dermis  Probe to Bone: No  Peripheral Skin Description: Attached  Granulation:  40 % Fibrotic Tissue:  60 % Necrotic Tissue:  0 %   Drainage Amount: minimal, serous  Signs of Infection: No          Neurological:  Gross sensation is diminished  Protective sensation is diminished  Patient Reports numbness and/or paresthesias  Lab Results:   Admission on 09/09/2020   Component Date Value    POC Glucose 09/09/2020 128        Cultures:            Imaging: I have personally reviewed pertinent films in PACS  No results found  EKG, Pathology, and Other Studies: I have personally reviewed pertinent reports

## 2020-09-10 ENCOUNTER — APPOINTMENT (INPATIENT)
Dept: RADIOLOGY | Facility: HOSPITAL | Age: 65
DRG: 623 | End: 2020-09-10
Payer: COMMERCIAL

## 2020-09-10 LAB
ANION GAP SERPL CALCULATED.3IONS-SCNC: 7 MMOL/L (ref 4–13)
BASOPHILS # BLD AUTO: 0.06 THOUSANDS/ΜL (ref 0–0.1)
BASOPHILS NFR BLD AUTO: 1 % (ref 0–1)
BUN SERPL-MCNC: 20 MG/DL (ref 5–25)
CALCIUM SERPL-MCNC: 8.5 MG/DL (ref 8.3–10.1)
CHLORIDE SERPL-SCNC: 105 MMOL/L (ref 100–108)
CO2 SERPL-SCNC: 25 MMOL/L (ref 21–32)
CREAT SERPL-MCNC: 1.14 MG/DL (ref 0.6–1.3)
EOSINOPHIL # BLD AUTO: 0.2 THOUSAND/ΜL (ref 0–0.61)
EOSINOPHIL NFR BLD AUTO: 3 % (ref 0–6)
ERYTHROCYTE [DISTWIDTH] IN BLOOD BY AUTOMATED COUNT: 12.6 % (ref 11.6–15.1)
GFR SERPL CREATININE-BSD FRML MDRD: 68 ML/MIN/1.73SQ M
GLUCOSE SERPL-MCNC: 115 MG/DL (ref 65–140)
GLUCOSE SERPL-MCNC: 117 MG/DL (ref 65–140)
GLUCOSE SERPL-MCNC: 140 MG/DL (ref 65–140)
GLUCOSE SERPL-MCNC: 148 MG/DL (ref 65–140)
GLUCOSE SERPL-MCNC: 192 MG/DL (ref 65–140)
HCT VFR BLD AUTO: 38 % (ref 36.5–49.3)
HGB BLD-MCNC: 13.3 G/DL (ref 12–17)
IMM GRANULOCYTES # BLD AUTO: 0.05 THOUSAND/UL (ref 0–0.2)
IMM GRANULOCYTES NFR BLD AUTO: 1 % (ref 0–2)
LYMPHOCYTES # BLD AUTO: 1.56 THOUSANDS/ΜL (ref 0.6–4.47)
LYMPHOCYTES NFR BLD AUTO: 24 % (ref 14–44)
MCH RBC QN AUTO: 33.7 PG (ref 26.8–34.3)
MCHC RBC AUTO-ENTMCNC: 35 G/DL (ref 31.4–37.4)
MCV RBC AUTO: 96 FL (ref 82–98)
MONOCYTES # BLD AUTO: 0.64 THOUSAND/ΜL (ref 0.17–1.22)
MONOCYTES NFR BLD AUTO: 10 % (ref 4–12)
NEUTROPHILS # BLD AUTO: 4.09 THOUSANDS/ΜL (ref 1.85–7.62)
NEUTS SEG NFR BLD AUTO: 61 % (ref 43–75)
NRBC BLD AUTO-RTO: 0 /100 WBCS
PLATELET # BLD AUTO: 204 THOUSANDS/UL (ref 149–390)
PMV BLD AUTO: 9.2 FL (ref 8.9–12.7)
POTASSIUM SERPL-SCNC: 3.7 MMOL/L (ref 3.5–5.3)
RBC # BLD AUTO: 3.95 MILLION/UL (ref 3.88–5.62)
SODIUM SERPL-SCNC: 137 MMOL/L (ref 136–145)
WBC # BLD AUTO: 6.6 THOUSAND/UL (ref 4.31–10.16)

## 2020-09-10 PROCEDURE — 99254 IP/OBS CNSLTJ NEW/EST MOD 60: CPT | Performed by: INTERNAL MEDICINE

## 2020-09-10 PROCEDURE — 85025 COMPLETE CBC W/AUTO DIFF WBC: CPT | Performed by: STUDENT IN AN ORGANIZED HEALTH CARE EDUCATION/TRAINING PROGRAM

## 2020-09-10 PROCEDURE — 99232 SBSQ HOSP IP/OBS MODERATE 35: CPT | Performed by: PODIATRIST

## 2020-09-10 PROCEDURE — 97116 GAIT TRAINING THERAPY: CPT

## 2020-09-10 PROCEDURE — 97163 PT EVAL HIGH COMPLEX 45 MIN: CPT

## 2020-09-10 PROCEDURE — 80048 BASIC METABOLIC PNL TOTAL CA: CPT | Performed by: STUDENT IN AN ORGANIZED HEALTH CARE EDUCATION/TRAINING PROGRAM

## 2020-09-10 PROCEDURE — 82948 REAGENT STRIP/BLOOD GLUCOSE: CPT

## 2020-09-10 PROCEDURE — 97166 OT EVAL MOD COMPLEX 45 MIN: CPT

## 2020-09-10 PROCEDURE — 73723 MRI JOINT LWR EXTR W/O&W/DYE: CPT

## 2020-09-10 PROCEDURE — G1004 CDSM NDSC: HCPCS

## 2020-09-10 PROCEDURE — A9585 GADOBUTROL INJECTION: HCPCS | Performed by: STUDENT IN AN ORGANIZED HEALTH CARE EDUCATION/TRAINING PROGRAM

## 2020-09-10 RX ORDER — FAMOTIDINE 20 MG/1
20 TABLET, FILM COATED ORAL DAILY
Status: DISCONTINUED | OUTPATIENT
Start: 2020-09-10 | End: 2020-09-15 | Stop reason: HOSPADM

## 2020-09-10 RX ORDER — GABAPENTIN 300 MG/1
300 CAPSULE ORAL 3 TIMES DAILY
Status: DISCONTINUED | OUTPATIENT
Start: 2020-09-10 | End: 2020-09-15 | Stop reason: HOSPADM

## 2020-09-10 RX ADMIN — METRONIDAZOLE 500 MG: 500 TABLET ORAL at 05:06

## 2020-09-10 RX ADMIN — GABAPENTIN 300 MG: 300 CAPSULE ORAL at 21:42

## 2020-09-10 RX ADMIN — INSULIN LISPRO 2 UNITS: 100 INJECTION, SOLUTION INTRAVENOUS; SUBCUTANEOUS at 17:14

## 2020-09-10 RX ADMIN — HEPARIN SODIUM 5000 UNITS: 5000 INJECTION INTRAVENOUS; SUBCUTANEOUS at 21:43

## 2020-09-10 RX ADMIN — METRONIDAZOLE 500 MG: 500 TABLET ORAL at 21:42

## 2020-09-10 RX ADMIN — HEPARIN SODIUM 5000 UNITS: 5000 INJECTION INTRAVENOUS; SUBCUTANEOUS at 13:01

## 2020-09-10 RX ADMIN — CEFAZOLIN SODIUM 2000 MG: 2 SOLUTION INTRAVENOUS at 03:30

## 2020-09-10 RX ADMIN — Medication 1 APPLICATION: at 10:39

## 2020-09-10 RX ADMIN — ATORVASTATIN CALCIUM 20 MG: 20 TABLET, FILM COATED ORAL at 08:16

## 2020-09-10 RX ADMIN — GADOBUTROL 8 ML: 604.72 INJECTION INTRAVENOUS at 21:37

## 2020-09-10 RX ADMIN — NICOTINE 1 PATCH: 14 PATCH TRANSDERMAL at 08:16

## 2020-09-10 RX ADMIN — CEFAZOLIN SODIUM 2000 MG: 2 SOLUTION INTRAVENOUS at 18:32

## 2020-09-10 RX ADMIN — CEFAZOLIN SODIUM 2000 MG: 2 SOLUTION INTRAVENOUS at 10:40

## 2020-09-10 RX ADMIN — LISINOPRIL 30 MG: 10 TABLET ORAL at 08:16

## 2020-09-10 RX ADMIN — GABAPENTIN 300 MG: 300 CAPSULE ORAL at 17:13

## 2020-09-10 RX ADMIN — METRONIDAZOLE 500 MG: 500 TABLET ORAL at 13:01

## 2020-09-10 RX ADMIN — HEPARIN SODIUM 5000 UNITS: 5000 INJECTION INTRAVENOUS; SUBCUTANEOUS at 05:06

## 2020-09-10 RX ADMIN — FAMOTIDINE 20 MG: 20 TABLET ORAL at 10:40

## 2020-09-10 NOTE — ASSESSMENT & PLAN NOTE
Lab Results   Component Value Date    HGBA1C 6 4 (H) 07/02/2020       Recent Labs     09/09/20  1743 09/09/20  2122 09/10/20  0748   POCGLU 128 146* 115       Blood Sugar Average: Last 72 hrs:  (P) 030 [id number]     Hold metformin while inpatient  Continue with insulin sliding scale  Monitor

## 2020-09-10 NOTE — PROGRESS NOTES
Podiatry - Progress Note  Patient: Perico Fofana 63 y o  male   MRN: [id number]  PCP: Sobia Jarvis DO  Unit/Bed#: [facility] 827-01 Encounter: [id number]  Date Of Visit: 09/10/20    ASSESSMENT:    Perico Fofana is a 63 y o  male with:    1  Right lateral foot ulceration - Almanzar 3 - POA  2  Right distal posterior leg ulceration - Almanzar 1 - POA  3  Type 2 diabetes with peripheral neuropathy  4  History of left leg BKA  5  History of right foot TMA  6  Obesity    PLAN:    · Local wound care with Anirudh Balderas, DSD to lateral wound and maxorb DSD to posterior leg wound  · Xray were reviewed by myself: no signs of osteomyelitis  Pending final read  · F/u wound cultures and blood cultures  · VSS, no leukocytosis, atoxic in appearance  · Appreciate ID consult  · Appreciate IM consult  · Antibiotics: Ancef, metronidazole  · VTE prophylaxis: heparin and SCD pump  · Rest of medical care per primary team        SUBJECTIVE:     The patient was seen, evaluated, and assessed at bedside today  The patient was awake, alert, and in no acute distress  No acute events overnight  The patient reports he did not sleep well   Patient denies N/V/F/chills/SOB/CP  OBJECTIVE:     Vitals:   /74   Pulse 75   Temp 98 6 °F (37 °C)   Resp 15   SpO2 97%     Temp (24hrs), Av 8 °F (37 1 °C), Min:98 6 °F (37 °C), Max:98 9 °F (37 2 °C)      Physical Exam:     General:  Alert, cooperative, and in no distress  Lower extremity exam:  Cardiovascular status at baseline  Neurological status at baseline  Musculoskeletal status at baseline  No calf tenderness noted  Lower extremity wound(s) as noted below:    Wound 1 located right distal lateral forefoot, measures 4 5 cm x 3 cm x 0 6 cm  without slight undermining noted along the distal and plantar select of surrounding hyperkeratotic lesion undermining  Wound bed appears granular and fibrotic with light serosanguinous, no purulence  noted at this time    Deepest tissue noted periosteum  Malodor is not noticed  Wound edge appears Attached  Lydia-wound skin appears intact and calloused  Probe to bone is,  Negative; however it probes to periosteum   Signs of infection are present at this time edema and mild purulence     Wound 2  located right distal posterior leg, overlying achilles tendon, measures 0 9 cm x 0 8 cm x 0 2 cm  without sinus tracking or undermining  Wound bed appears pink/red and fibrotic with slight Serous exudate  Deepest tissue noted dermis/subq  Malodor is not noticed  Wound edge appears Attached  Lydia-wound skin appears intact  Probe to bone is,  negative   Signs of infection are not present at this time  Clinical Images 09/10/20: Additional Data:     Labs:    Results from last 7 days   Lab Units 09/10/20  0529   WBC Thousand/uL 6 60   HEMOGLOBIN g/dL 13 3   HEMATOCRIT % 38 0   PLATELETS Thousands/uL 204   NEUTROS PCT % 61   LYMPHS PCT % 24   MONOS PCT % 10   EOS PCT % 3     Results from last 7 days   Lab Units 09/10/20  0529 09/09/20  1850   POTASSIUM mmol/L 3 7 4 3   CHLORIDE mmol/L 105 106   CO2 mmol/L 25 27   BUN mg/dL 20 24   CREATININE mg/dL 1 14 1 11   CALCIUM mg/dL 8 5 9 6   ALK PHOS U/L  --  88   ALT U/L  --  25   AST U/L  --  18           * I Have Reviewed All Lab Data Listed Above  Recent Cultures (last 7 days):     Results from last 7 days   Lab Units 09/09/20  1937 09/09/20  1832   BLOOD CULTURE  Received in Microbiology Lab  Culture in Progress  Received in Microbiology Lab  Culture in Progress  --    GRAM STAIN RESULT   --  No Polys or Bacteria seen           Imaging: I have personally reviewed pertinent films in PACS  EKG, Pathology, and Other Studies: I have personally reviewed pertinent reports  ** Please Note: Portions of the record may have been created with voice recognition software  Occasional wrong word or "sound a like" substitutions may have occurred due to the inherent limitations of voice recognition software   Read the chart carefully and recognize, using context, where substitutions have occurred   **

## 2020-09-10 NOTE — UTILIZATION REVIEW
Initial Clinical Review    Admission: Date/Time/Statement:   Admission Orders (From admission, onward)     Ordered        09/09/20 1836  Inpatient Admission  Once                   Orders Placed This Encounter   Procedures    Inpatient Admission     Standing Status:   Standing     Number of Occurrences:   1     Order Specific Question:   Admitting Physician     Answer:   Alejandrina Juárez     Order Specific Question:   Level of Care     Answer:   Med Surg [16]     Order Specific Question:   Estimated length of stay     Answer:   More than 2 Midnights     Order Specific Question:   Certification     Answer:   I certify that inpatient services are medically necessary for this patient for a duration of greater than two midnights  See H&P and MD Progress Notes for additional information about the patient's course of treatment  Assessment/Plan: 63year old male to the ED from home directly admitted to inpatient unit for management of foot ulcer with IV abx and wound care in a diabetic patient  Has had wound to right lateral forefoot which started 2 months prior  Has peripheral neuropathy  Has been seeing OP wound care, but continues with unchanged wound  History left leg BKA, history right foot TMA  Right foot was debrided  Wound cultures obtained and pending  Blood cultures pending  IV abx started  PT/OT  Pedal pulses + on right foot  CRT brisk  PT/OT eval: DC to home pending stairs if patient agreeable  9/9 Wound debridement note:After verbal consent was obtained, wound located at right distal lateral forefoot was excisionally debrided with a #15 blade of all nonviable, devitalized, necrotic, sloughy, fibrous, hyperkeratotic tissue w/ excision of subcutaneous tissue to a depth of periosteum  Post debridement wound measurements 4 5 x 3 0 x 0 7 cm, with appearance of wound fresh bleeding tissue, granular tissue with viable 70 % vs nonviable 30 %, <20sq cm debrided            9/10 Medicine consult:diabetes mellitus type 2 on metformin, hypertension on lisinopril, acid reflux    ID consult  S/P bedside I&D on 9/9  Continue lisinopril  Insulin sliding scale while inpatient  9/10 Podiatry note:  MRI to rule out osteomyelitis  Start wound VAC, possible OR debridement depending on progress  NWB to right foot  Temperature Pulse Respirations Blood Pressure SpO2   09/09/20 1800 09/09/20 1800 09/09/20 1800 09/09/20 1800 09/09/20 1800   98 9 °F (37 2 °C) 105 18 170/73 99 %      Temp Source Heart Rate Source Patient Position - Orthostatic VS BP Location FiO2 (%)   09/09/20 1800 09/09/20 1800 09/09/20 1800 09/09/20 1800 --   Oral Monitor Lying Left arm       Pain Score       09/09/20 1940       No Pain          Wt Readings from Last 1 Encounters:   09/01/20 85 3 kg (188 lb)     Additional Vital Signs:   Date/Time   Temp   Pulse   Resp   BP   MAP (mmHg)   SpO2   O2 Device   Patient Position - Orthostatic VS    09/10/20 07:50:17   98 °F (36 7 °C)   63   18   159/81   107   96 %          09/09/20 23:27:29   98 6 °F (37 °C)   75   15   154/74   101   97 %          09/09/20 1940                     None (Room air)       09/09/20 1800   98 9 °F (37 2 °C)   105   18   170/73              Pertinent Labs/Diagnostic Test Results:   9/10 CXR; No acute cardiopulmonary disease  9/10 Xray right foot:   No radiographic sign of osteomyelitis  No fracture or dislocation           Results from last 7 days   Lab Units 09/10/20  0529 09/09/20  1850   WBC Thousand/uL 6 60 8 58   HEMOGLOBIN g/dL 13 3 14 5   HEMATOCRIT % 38 0 41 4   PLATELETS Thousands/uL 204 227   NEUTROS ABS Thousands/µL 4 09 6 02         Results from last 7 days   Lab Units 09/10/20  0529 09/09/20  1850   SODIUM mmol/L 137 139   POTASSIUM mmol/L 3 7 4 3   CHLORIDE mmol/L 105 106   CO2 mmol/L 25 27   ANION GAP mmol/L 7 6   BUN mg/dL 20 24   CREATININE mg/dL 1 14 1 11   EGFR ml/min/1 73sq m 68 70   CALCIUM mg/dL 8 5 9 6     Results from last 7 days   Lab Units 09/09/20  1850   AST U/L 18   ALT U/L 25   ALK PHOS U/L 88   TOTAL PROTEIN g/dL 7 4   ALBUMIN g/dL 3 6   TOTAL BILIRUBIN mg/dL 1 02*     Results from last 7 days   Lab Units 09/10/20  0748 09/09/20  2122 09/09/20  1743   POC GLUCOSE mg/dl 115 146* 128     Results from last 7 days   Lab Units 09/10/20  0529 09/09/20  1850   GLUCOSE RANDOM mg/dL 117 114     Results from last 7 days   Lab Units 09/09/20  1850   CRP mg/L 14 8*   SED RATE mm/hour 47*         Results from last 7 days   Lab Units 09/09/20  1937 09/09/20  1832   BLOOD CULTURE  Received in Microbiology Lab  Culture in Progress  Received in Microbiology Lab  Culture in Progress  --    GRAM STAIN RESULT   --  No Polys or Bacteria seen         Past Medical History:   Diagnosis Date    Diabetes mellitus ([place] [address] )     Hypertension     Neuropathy in diabetes [facility])        Admitting Diagnosis: Ulcer of right foot ([facility] ) [L97 [id number]]  Age/Sex: 63 y o  male  Admission Orders:  SCDS  XRay right foot  CXR  BMP    Scheduled Medications:  atorvastatin, 20 mg, Oral, Daily  cefazolin, 2,000 mg, Intravenous, Q8H  Dakins (full strength), 1 application, Irrigation, Daily  famotidine, 20 mg, Oral, Daily  heparin (porcine), 5,000 Units, Subcutaneous, Q8H RAHEEM  insulin lispro, 1-6 Units, Subcutaneous, TID AC  insulin lispro, 1-6 Units, Subcutaneous, HS  lisinopril, 30 mg, Oral, Daily  metroNIDAZOLE, 500 mg, Oral, Q8H RAHEEM  nicotine, 1 patch, Transdermal, Daily      Continuous IV Infusions:     PRN Meds:  acetaminophen, 650 mg, Oral, Q6H PRN        IP CONSULT TO INFECTIOUS DISEASES  IP CONSULT TO INTERNAL MEDICINE    Network Utilization Review Department  [email or web address]  ATTENTION: Please call with any questions or concerns to [phone] and carefully listen to the prompts so that you are directed to the right person   All voicemails are confidential   Mark Escamilla all requests for admission clinical reviews, approved or denied determinations and any other requests to dedicated fax number below belonging to the campus where the patient is receiving treatment   List of dedicated fax numbers for the Facilities:  [address] [place] [address] DENIALS (Administrative/Medical Necessity) [phone]   [address]  (Maternity/NICU/Pediatrics) [phone]   Fei Hawkins [phone]   [address] [facility] [phone]   [address] [place] [address] [phone]   [address] [facility]  [phone]   [address] [place] [address] [phone]   [facility]  [phone]   [address] [facility]  [address] [facility] [address] [facility] [phone]

## 2020-09-10 NOTE — CONSULTS
Consultation - Infectious Disease   Nino Leon 63 y o  male MRN: [id number]  Unit/Bed#: [facility] 827-01 Encounter: [id number]      IMPRESSION & RECOMMENDATIONS:     45-year-old male patient with diabetes mellitus type 2, peripheral neuropathy, history of left BKA and right foot TMA, admitted for management of right foot ulceration    1- infected diabetic foot ulcer:  Patient with 2 ulcerations in place, one over the Achillis tendon and one over lateral plantar aspect of right foot, at site of TMA    No fever, patient remains hemodynamically stable  Wound culture growing gram-negative rods  X-ray negative for osteomyelitis  - agree with Podiatry team with checking MRI right foot to rule out underlying osteomyelitis  - okay to continue cefazolin and Flagyl for now  - follow-up final result of right foot wound culture and adjust antibiotics accordingly    2- diabetes mellitus type 2 and peripheral neuropathy:  Risk factor for poor wound healing and infectious complication diabetic ulcers  - management as per Internal Medicine team    3- hypertension    4- smoker:  Risk factor for poor wound healing  - encouraged  smoking cessation    Plan mentioned above discussed in details with patient  I Have discussed the above management plan in detail with the primary service    Extensive review of the medical records in epic including review of the notes, radiographs, and laboratory results     HISTORY OF PRESENT ILLNESS:  Reason for Consult:  Diabetic foot ulcer infection  HPI: Nino Leon is a 63y o  year old male with history of diabetes mellitus type 2, peripheral neuropathy, obesity, history of left BKA, and right foot TMA in 2018 secondary to infectious complications, now with ulcerations over right foot for which he follows up as outpatient with podiatrist   Patient admitted for workup and management of his right foot wounds as recommended by his podiatrist   He denies recent fever or chills, denies nausea vomiting or other systemic signs or symptoms of infection, he reports right foot pain, but denies swelling or recent erythema  Patient was started on Keflex p o  Prior to presentation which he received for the past 5 days  Upon admission, patient afebrile, hemodynamically stable, right foot x-ray negative for osteomyelitis  Patient currently started empirically on cefazolin and Flagyl, which he is  tolerating well with no apparent issues  Wound culture collected on admission is positive for gram-negative rods  ID service is consulted to assist in antibiotic management, and to rule out osteomyelitis  REVIEW OF SYSTEMS:  A complete review of systems is negative other than that noted in the HPI  PAST MEDICAL HISTORY:  Past Medical History:   Diagnosis Date    Diabetes mellitus ([facility] [address] )     Hypertension     Neuropathy in diabetes [facility])      Past Surgical History:   Procedure Laterality Date    AMPUTATION      FOOT SURGERY         FAMILY HISTORY:  Non-contributory    SOCIAL HISTORY:  Social History   Social History     Substance and Sexual Activity   Alcohol Use Yes    Alcohol/week: 1 0 standard drinks    Types: 1 Cans of beer per week    Frequency: 4 or more times a week    Drinks per session: 1 or 2     Social History     Substance and Sexual Activity   Drug Use Never     Social History     Tobacco Use   Smoking Status Current Some Day Smoker    Types: Pipe   Smokeless Tobacco Never Used       ALLERGIES:  Allergies   Allergen Reactions    Shellfish Allergy Itching     Dial body wash/Patients states he does not have a shellfish allergy       MEDICATIONS:  All current active medications have been reviewed        PHYSICAL EXAM:  Temp:  [98 °F (36 7 °C)-98 9 °F (37 2 °C)] 98 4 °F (36 9 °C)  HR:  [] 59  Resp:  [15-18] 18  BP: (144-170)/(58-81) 144/58  SpO2:  [96 %-99 %] 98 %  Temp (24hrs), Av 5 °F (36 9 °C), Min:98 °F (36 7 °C), Max:98 9 °F (37 2 °C)  Current: Temperature: 98 4 °F (36 9 °C)    Intake/Output Summary (Last 24 hours) at 9/10/2020 1557  Last data filed at 9/10/2020 1408  Gross per 24 hour   Intake 340 ml   Output 2625 ml   Net -2285 ml       General Appearance:  Appearing well, nontoxic, and in no distress   Head:  Normocephalic, without obvious abnormality, atraumatic   Eyes:  Conjunctiva pink and sclera anicteric, both eyes   Nose: Nares normal, mucosa normal, no drainage   Throat: Oropharynx moist without lesions   Neck: Supple, symmetrical, no adenopathy, no tenderness/mass/nodules   Back:   Symmetric, no curvature, ROM normal, no CVA tenderness   Lungs:   Clear to auscultation bilaterally, respirations unlabored   Chest Wall:  No tenderness or deformity   Heart:  RRR; no murmur, rub or gallop   Abdomen:   Soft, non-tender, non-distended, positive bowel sounds    Extremities: No cyanosis, clubbing or edema   Skin: No rashes or lesions  Two Right foot ulcerations  Dressing is dry and clean, no breakthrough bleeding or drainage, no erythema extending beyond limits of the dressing  Pictures of the ulcerations taken today review  Ulceration over a callus tendon, with minimal surrounding erythema  Lateral plantar ulceration at site of TMA  Left foot BKA   Lymph nodes: Cervical, supraclavicular nodes normal   Neurologic: Alert and oriented times 3, extremity strength 5/5 and symmetric       LABS, IMAGING, & OTHER STUDIES:  Lab Results:  I have personally reviewed pertinent labs    Results from last 7 days   Lab Units 09/10/20  0529 09/09/20  1850   WBC Thousand/uL 6 60 8 58   HEMOGLOBIN g/dL 13 3 14 5   PLATELETS Thousands/uL 204 227     Results from last 7 days   Lab Units 09/10/20  0529 09/09/20  1850   SODIUM mmol/L 137 139   POTASSIUM mmol/L 3 7 4 3   CHLORIDE mmol/L 105 106   CO2 mmol/L 25 27   BUN mg/dL 20 24   CREATININE mg/dL 1 14 1 11   EGFR ml/min/1 73sq m 68 70   CALCIUM mg/dL 8 5 9 6   AST U/L  --  18   ALT U/L  --  25   ALK PHOS U/L  --  88     Results from last 7 days Lab Units 09/09/20  1937 09/09/20  1832   BLOOD CULTURE  Received in Microbiology Lab  Culture in Progress  Received in Microbiology Lab  Culture in Progress  --    GRAM STAIN RESULT   --  No Polys or Bacteria seen   WOUND CULTURE   --  1+ Growth of Gram Negative Rangel Enteric Like*         Results from last 7 days   Lab Units 09/09/20  1850   CRP mg/L 14 8*               Imaging Studies:   I have personally reviewed pertinent imaging study reports and images in PACS  Chest x-ray with no effusion or infiltrate  Right foot x-ray with no bony abnormalities or signs of osteomyelitis    Other Studies:   I have personally reviewed pertinent reports    Right foot wound culture 2018 available in Care everywhere, positive for MSSA and pan susceptible Klebsiella oxytoca

## 2020-09-10 NOTE — OCCUPATIONAL THERAPY NOTE
Occupational Therapy Evaluation     Patient Name: Rolan Singh  YCJBQ'S Date: 9/10/2020  Problem List  Principal Problem:    Ulcer of right foot with necrosis of muscle ([facility] )  Active Problems:    BMI 32 0-32 9,adult    Cigar smoker    Pipe smoker    DM (diabetes mellitus), type 2 with neurological complications (HCC)    GERD (gastroesophageal reflux disease)    Essential hypertension    Venous ulcer of right lower extremity without varicose veins (HCC)    Lower limb ulcer, heel or midfoot, right, with fat layer exposed ([facility] )    Diabetic ulcer of right midfoot associated with type 2 diabetes mellitus, with [facility] layer [facility] )    Type 2 diabetes mellitus with diabetic polyneuropathy ([facility] )    Past Medical History  Past Medical History:   Diagnosis Date    Diabetes mellitus ([facility] )     Hypertension     Neuropathy in diabetes ([facility] )      Past Surgical History  Past Surgical History:   Procedure Laterality Date    AMPUTATION      FOOT SURGERY               09/10/20 0840   Note Type   Note type Eval only   Restrictions/Precautions   Weight Bearing Precautions Per Order Yes   RLE Weight Bearing Per Order NWB   Braces or Orthoses   (+Right LE surgical shoe, left LE prothesis (BKA))   Other Precautions Fall Risk;Telemetry;WBS   Pain Assessment   Pain Assessment Tool Pain Assessment not indicated - pt denies pain   Pain Score No Pain   Home Living   Type of Home Apartment  (2nd floor with 20 FRED)   Home Layout One level   Bathroom Shower/Tub Tub/shower unit   Bathroom Toilet Standard   Bathroom Equipment Shower chair;Grab bars around toilet   [facility]; Wheelchair-manual   Prior Function   Level of [place] Independent with ADLs and functional mobility   Lives With Significant other   Receives Help From Family   ADL Assistance Independent   IADLs Independent   Falls in the last 6 months 0   Vocational Full time employment   Lifestyle   Autonomy I with ADL's/IADL's, +left prosthesis -functional ambulation without AD, +drives, works full time   Reciprocal Relationships significant other   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   Intrinsic Gratification cooking   Psychosocial   Psychosocial (WDL) WDL   ADL   Eating Assistance 7  Independent   Grooming Assistance 7  [address] 5  Supervision/Setup   [address] 5  Supervision/Setup   UB Dressing Assistance 5  Supervision/Setup    [facility] 5  [address]  5  Supervision/Setup   Transfers   Sit to Stand 4  Minimal assistance   Additional items Assist x 1   Stand to Sit 4  Minimal assistance   Additional items Assist x 1   Additional Comments +RW   Functional Mobility   Functional Mobility 4  Minimal assistance   Additional Comments min a x 1 with right knee scooter to maintian NWB status short distance functional mobility, min a x 1 with RW hopping from chair to bathroom, able to maintain NWB to RLE   (pt reports utilized a knee scooter for approx~1 month with TMA surgery, familiar with scooter)   Additional items   (knee scooter)   Balance   Static Sitting Normal   Dynamic Sitting Good   Static Standing Fair   Dynamic Standing Fair -   Ambulatory Poor +   Activity Tolerance   Activity Tolerance Patient limited by fatigue   Medical Staff Made Aware PT Aletha Hamilton   Nurse Made Aware RN cleared pt for therapy    RUE Assessment   RUE Assessment WFL   LUE Assessment   LUE Assessment WFL   Hand Function   Gross Motor Coordination Functional   Fine Motor Coordination Functional   Vision-Basic Assessment   Current Vision Wears glasses all the time   Vision - Complex Assessment   Acuity Able to read normal print without difficulty   Cognition   Overall Cognitive Status [facility]   Arousal/Participation Alert; Cooperative   Attention Within functional limits   Orientation Level Oriented X4   Memory Within functional limits Following Commands Follows all commands and directions without difficulty   Assessment   Limitation Decreased ADL status; Decreased endurance;Decreased self-care trans;Decreased high-level ADLs   Prognosis Good   Assessment Pt is a 63 y o  male who [facility] with Ulcer of right foot with necrosis of muscle ([address] ) NWB to R LE with surgical shoe, HTN, diabetes type 2, cigar/pipe smoker   Pt's problem list also includes PMH of SUSANA, Left BKA, right TMA, cellulitis, dyslipidemia, venous insuffiency  At baseline pt was completing I with ADL's/IADL's, +prosthesis with LLE and no AD with functional ambulation, drives, works full time  Pt lives with significant other in a 2nd floor apartment without elevator +20 steps to enter (pt reports know how to bump up stairs)  Currently pt requires S/set-up for overall ADLS and min a  for functional mobility/transfers  Pt currently presents with impairments in the following categories -difficulty performing IADLS  activity tolerance, endurance and standing balance/tolerance   These impairments, as well as pt's fatigue, WBS  and risk for falls  limit pt's ability to safely engage in all baseline areas of occupation, includingfunctional mobility/transfers, community mobility, laundry , driving, house maintenance, medication management, meal prep, cleaning, work/volunteer work  and leisure activities  From OT standpoint, recommend home with family support upon D/C, pt denies need for a commode (educated pt on 3:1 commode, if pt changes mind , discuss ordering with case management) No further acute OT needs indicated at this time - Recommend continued oob for meals, ambulation to/from BR, setup for self care tasks and mobility in hallway with nursing/restorative - d/c from caseload with above recommendations   Goals   Patient Goals go home   Recommendation   OT Discharge Recommendation Return to previous environment with social support   OT - OK to Discharge Yes   Barthel Index   Feeding 10   Bathing 0   Grooming Score 5   Dressing Score 10   Bladder Score 10   Bowels Score 10   Toilet Use Score 10   Transfers (Bed/Chair) Score 10   Mobility (Level Surface) Score 0   Stairs Score 0   Barthel Index Score 65   Modified De Witt Scale   Modified De Witt Scale 3     Faye Eugene MOT, OTR/L

## 2020-09-10 NOTE — ASSESSMENT & PLAN NOTE
Right lateral foot ulceration  Right distal posterior leg ulceration   History of left leg BKA  History of right foot TMA    Status post bedside I&D by Podiatry on 9/9  Follow on x-ray  On IV antibiotic  ID consulted  Management per primary

## 2020-09-10 NOTE — QUICK NOTE
During morning labs, it was noticed that patient had a tin of tobacco pouches in his pocket  Due to previous instances of patient aggression when confronted about contraband, DOC Stack was notified of found tobacco  Charge DOC Lopez was notified as well

## 2020-09-10 NOTE — PLAN OF CARE
Problem: PHYSICAL THERAPY ADULT  Goal: Performs mobility at highest level of function for planned discharge setting  See evaluation for individualized goals  Description: Treatment/Interventions: Functional transfer training, LE strengthening/ROM, Elevations, Therapeutic exercise, Endurance training, Patient/family training, Equipment eval/education, Bed mobility, Gait training  Equipment Recommended: Other (Comment)(knee scooter)       See flowsheet documentation for full assessment, interventions and recommendations  Note: Prognosis: Good  Problem List: Decreased strength, Decreased endurance, Impaired balance, Decreased mobility, Orthopedic restrictions  Assessment: Pt is a 63 y o  male seen for PT evaluation s/p admit to [facility] on 9/9/2020  Pt was admitted with a primary dx of: Ulcer of R foot s/p debridement by podiatry on 9/9  Pt now NWB R LE  PT now consulted for assessment of mobility and d/c needs  Pts current comorbidities effecting treatment include: L BKA (pt reports 8 years ago, prosthesis), R TMA, Obesity, DM, HTN  Personal factors effecting treatment include: stairs to enter apartment, full-time employment, currently working from home  Pts current clinical presentation is Unstable/ Unpredictable (high complexity) due to Ongoing medical management for primary dx, Increased reliance on more restrictive AD compared to baseline, Decreased activity tolerance compared to baseline, Fall risk, Increased assistance needed from caregiver at current time, Current WBS  Prior to admission, pt was independent, no AD with ambulation  Upon evaluation, pt currently is modified independent with bed mobility; requires Tanner for transfers and Tanner for ambulation 10 ft w/ RW  Treatment provided for use of knee scooter as noted below, pt refused to perform stair training, states he bumped up the same stairs at home after his TMA when he was NWB    Pt presents at PT eval functioning below baseline and currently w/ overall mobility deficits 2* to: RLE weakness, impaired balance, decreased endurance, gait deviations, decreased activity tolerance compared to baseline, decreased functional mobility tolerance compared to baseline, decreased safety awareness, fall risk, orthopedic restrictions  Pt currently at a fall risk 2* to impairments listed above  Pt will continue to benefit from skilled acute PT interventions to address stated impairments; to maximize functional mobility; for ongoing pt/ family training; and DME needs  At conclusion of PT session pt returned BTB with phone and call bell within reach  Pt denies any further questions at this time  Recommend home with family care upon hospital D/C  Barriers to Discharge: Inaccessible home environment     PT Discharge Recommendation: Return to previous environment with social support(home with family care)     PT - OK to Discharge: No(pending stairs if patient agreeable)    See flowsheet documentation for full assessment

## 2020-09-10 NOTE — CONSULTS
Consult- Rupa Zazueta 1955, 63 y o  male MRN: [id number]    Unit/Bed#: [facility] 827-01 Encounter: [id number]    Primary Care Provider: Marisela Olmedo DO   Date and time admitted to hospital: 9/9/2020  5:16 PM      Inpatient consult to Internal Medicine  Consult performed by: Marquis Magallanes DO  Consult ordered by: Margi Rose DPM          Type 2 diabetes mellitus with diabetic polyneuropathy [facility])  Assessment & Plan  Lab Results   Component Value Date    HGBA1C 6 4 (H) 07/02/2020       Recent Labs     09/09/20  1743 09/09/20  2122 09/10/20  0748   POCGLU 128 146* 115       Blood Sugar Average: Last 72 hrs:  (P) 585 [id number]     Hold metformin while inpatient  Continue with insulin sliding scale  Monitor    Essential hypertension  Assessment & Plan  Acceptable BP  Continue lisinopril    Ulcer of right foot with necrosis of muscle (HCC)  Assessment & Plan  Right lateral foot ulceration  Right distal posterior leg ulceration   History of left leg BKA  History of right foot TMA    Status post bedside I&D by Podiatry on 9/9  Follow on x-ray  On IV antibiotic  ID consulted  Management per primary    GERD (gastroesophageal reflux disease)  Assessment & Plan  Restart Pepcid 20 mg daily    Pipe smoker  Assessment & Plan  Continue nicotine patch      VTE Prophylaxis: Heparin  / sequential compression device     Recommendations for Discharge:  · no    Counseling / Coordination of Care Time: 1 hour  Greater than 50% of total time spent on patient counseling and coordination of care  Collaboration of Care: Were Recommendations Directly Discussed with Primary Treatment Team? - No     History of Present Illness:    Rupa Zazueta is a 63 y o  male who is originally admitted to the Podiatry service due to right lateral foot ulceration    We are consulted for medical management  Patient with underlying diabetes mellitus type 2 on metformin, hypertension on lisinopril, acid reflux on  Pepcid, history of left leg BKA and right foot TMA  was admitted due to right lateral foot ulceration  Denied fever or chills, no nausea vomiting or diarrhea, no dysuria  No chest pain or shortness of breath    He underwent bedside right foot ulcer I&D on 9/9 by Podiatry  Wound culture and blood culture collected    Review of Systems:    Review of Systems   Constitutional: Negative for chills and fever  HENT: Negative for sore throat  Respiratory: Negative for chest tightness and shortness of breath  Cardiovascular: Negative for chest pain, palpitations and leg swelling  Gastrointestinal: Negative for abdominal pain, blood in stool, diarrhea, nausea and vomiting  Genitourinary: Negative for difficulty urinating and dysuria  Musculoskeletal: Positive for gait problem  Skin: Positive for wound  Neurological: Negative for dizziness, speech difficulty and headaches  All other systems reviewed and are negative  Past Medical and Surgical History:     Past Medical History:   Diagnosis Date    Diabetes mellitus ([facility] )     Hypertension     Neuropathy in diabetes ([facility] )        Past Surgical History:   Procedure Laterality Date    AMPUTATION      FOOT SURGERY         Meds/Allergies:    all medications and allergies reviewed    Allergies: Allergies   Allergen Reactions    Shellfish Allergy Itching     Dial body wash/Patients states he does not have a shellfish allergy       Social History:     Marital Status: Single    Substance Use History:   Social History     Substance and Sexual Activity   Alcohol Use Yes    Alcohol/week: 1 0 standard drinks    Types: 1 Cans of beer per week    Frequency: 4 or more times a week    Drinks per session: 1 or 2     Social History     Tobacco Use   Smoking Status Current Some Day Smoker    Types: Pipe   Smokeless Tobacco Never Used     Social History     Substance and Sexual Activity   Drug Use Never       Family History:    No family history on file    Physical Exam:     Vitals: Blood Pressure: 159/81 (09/10/20 0750)  Pulse: 63 (09/10/20 0750)  Temperature: 98 °F (36 7 °C) (09/10/20 0750)  Temp Source: Oral (09/09/20 1800)  Respirations: 18 (09/10/20 0750)  SpO2: 96 % (09/10/20 0750)    Physical Exam  Vitals signs reviewed  Constitutional:       Appearance: Normal appearance  He is not ill-appearing  Comments: Obese   HENT:      Head: Normocephalic and atraumatic  Mouth/Throat:      Mouth: Mucous membranes are moist       Pharynx: No oropharyngeal exudate  Eyes:      General: No scleral icterus  Extraocular Movements: Extraocular movements intact  Neck:      Musculoskeletal: Normal range of motion and neck supple  Cardiovascular:      Rate and Rhythm: Normal rate and regular rhythm  Pulses: Normal pulses  Heart sounds: Normal heart sounds  Pulmonary:      Effort: Pulmonary effort is normal       Breath sounds: Normal breath sounds  Abdominal:      General: Bowel sounds are normal       Palpations: Abdomen is soft  Musculoskeletal: Normal range of motion  Comments: Left BKA  Status post right foot TMA  Wound dressing with Ace wrap in place   Skin:     General: Skin is warm and dry  Neurological:      General: No focal deficit present  Mental Status: He is alert and oriented to person, place, and time  Psychiatric:         Mood and Affect: Mood normal            Additional Data:     Lab Results: I have personally reviewed pertinent reports        Results from last 7 days   Lab Units 09/10/20  0529   WBC Thousand/uL 6 60   HEMOGLOBIN g/dL 13 3   HEMATOCRIT % 38 0   PLATELETS Thousands/uL 204   NEUTROS PCT % 61   LYMPHS PCT % 24   MONOS PCT % 10   EOS PCT % 3     Results from last 7 days   Lab Units 09/10/20  0529 09/09/20  1850   SODIUM mmol/L 137 139   POTASSIUM mmol/L 3 7 4 3   CHLORIDE mmol/L 105 106   CO2 mmol/L 25 27   BUN mg/dL 20 24   CREATININE mg/dL 1 14 1 11   ANION GAP mmol/L 7 6   CALCIUM mg/dL 8 5 9 6   ALBUMIN g/dL  --  3 6 TOTAL BILIRUBIN mg/dL  --  1 02*   ALK PHOS U/L  --  88   ALT U/L  --  25   AST U/L  --  18   GLUCOSE RANDOM mg/dL 117 114             Lab Results   Component Value Date/Time    HGBA1C 6 4 (H) 07/02/2020 03:47 PM    HGBA1C 6 3 (H) 12/03/2018 02:01 PM    HGBA1C 6 6 (H) 05/29/2018 03:30 AM     Results from last 7 days   Lab Units 09/10/20  0748 09/09/20 2122 09/09/20  1743   POC GLUCOSE mg/dl 115 146* 128           Imaging: I have personally reviewed pertinent reports  XR chest pa & lateral    (Results Pending)   XR foot 3+ vw right    (Results Pending)       EKG, Pathology, and Other Studies Reviewed on Admission:   · yes    ** Please Note: This note has been constructed using a voice recognition system   **

## 2020-09-10 NOTE — PHYSICAL THERAPY NOTE
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 ([facility] )    Essential hypertension    Venous ulcer of right lower extremity without varicose veins (HCC)    Ulcer of right foot with necrosis of muscle ([facility] )    Lower limb ulcer, heel or midfoot, right, with fat layer exposed ([facility] )    SUSANA (acute kidney injury) ([facility] )    Albuminuria    Cellulitis    Cellulitis of foot, right    Diabetic ulcer of right midfoot associated with type 2 diabetes mellitus, with fat layer exposed ([facility] )    Dyslipidemia    Type 2 diabetes mellitus with diabetic polyneuropathy ([facility] )    Venous insufficiency       Past Medical History  Past Medical History:   Diagnosis Date    Diabetes mellitus ([place] [address] )     Hypertension     Neuropathy in diabetes [facility])        Past Surgical History  Past Surgical History:   Procedure Laterality Date    AMPUTATION      FOOT SURGERY          09/10/20 0907   PT Last Visit   PT Visit Date 09/10/20   Note Type   Note type Eval/Treat   Pain Assessment   Pain Assessment Tool Pain Assessment not indicated - pt denies pain   Pain Score No Pain   Home Living   Type of Home Apartment   Home Layout Stairs to enter with rails  (2nd floor, 20 steps to enter with B/L HR's)   Home Equipment Wheelchair-manual;Walker   Prior Function   Level of [place] Independent with ADLs and functional mobility   Lives With Significant other   Receives Help From Friend(s)   ADL Assistance Independent   IADLs Independent   Falls in the last 6 months 0   Vocational Full time employment  (currently working from home)   Comments Indep ambulation at baseline without AD, L prosthesis, very functional and independent  +    Restrictions/Precautions   Weight Bearing Precautions Per Order Yes   RLE Weight Bearing Per Order NWB   Braces or Orthoses Other (Comment); Prosthesis  (surical shoe R LE, L BKA prosthesis)   Other Precautions Fall Risk;WBS   General   Family/Caregiver Present No   Cognition   Overall Cognitive Status WFL   Arousal/Participation Alert   Orientation Level Oriented X4   Memory Within functional limits   Following Commands Follows all commands and directions without difficulty   Comments Pt with good insight to impairments, good safety awareness  Reports NWB R LE following R TMA, utilized knee scooter   RLE Assessment   RLE Assessment WFL  (Grossly 4/5, ankle not tested)   LLE Assessment   LLE Assessment WNL  (5/5)   Light Touch   RLE Light Touch Impaired   RLE Light Touch Comments impaired distally at foot, intact at knee   LLE Light Touch Grossly intact   Bed Mobility   Supine to Sit 6  Modified independent   Additional items HOB elevated; Bedrails   Sit to Supine 6  Modified independent   Additional items HOB elevated; Bedrails   Transfers   Sit to Stand 4  Minimal assistance   Additional items Assist x 1; Increased time required;Verbal cues   Stand to Sit 4  Minimal assistance   Additional items Assist x 1; Increased time required;Verbal cues   Additional Comments with RW   Ambulation/Elevation   Gait pattern Improper Weight shift;Decreased foot clearance; Excessively slow; Short stride   Gait Assistance 4  Minimal assist   Additional items Assist x 1   Assistive Device Rolling walker   Distance 10 ft x2 with RW, VC's to maintain NWB status, safety with rotational turns  Pt refers standard walker vs rolling walker, does not feel comfortable with wheels, PT stabilized RW for support/comfort      Stair Management Assistance Not tested  (pt refused )   Balance   Static Sitting Normal   Dynamic Sitting Good   Static Standing Fair   Dynamic Standing Fair -   Ambulatory Poor +   Activity Tolerance   Activity Tolerance Patient limited by fatigue   Medical Staff Made Aware OT, Jacquelin   Nurse Made Aware RN updated  Assessment   Prognosis Good   Problem List Decreased strength;Decreased endurance; Impaired balance;Decreased mobility;Orthopedic restrictions   Assessment Pt is a 63 y o  male seen for PT evaluation s/p admit to [facility] on 9/9/2020  Pt was admitted with a primary dx of: Ulcer of R foot s/p debridement by podiatry on 9/9  Pt now NWB R LE  PT now consulted for assessment of mobility and d/c needs  Pts current comorbidities effecting treatment include: L BKA (pt reports 8 years ago, prosthesis), R TMA, Obesity, DM, HTN  Personal factors effecting treatment include: stairs to enter apartment, full-time employment, currently working from home  Pts current clinical presentation is Unstable/ Unpredictable (high complexity) due to Ongoing medical management for primary dx, Increased reliance on more restrictive AD compared to baseline, Decreased activity tolerance compared to baseline, Fall risk, Increased assistance needed from caregiver at current time, Current WBS  Prior to admission, pt was independent, no AD with ambulation  Upon evaluation, pt currently is modified independent with bed mobility; requires Tanner for transfers and Tanner for ambulation 10 ft w/ RW  Treatment provided for use of knee scooter as noted below, pt refused to perform stair training, states he bumped up the same stairs at home after his TMA when he was NWB    Pt presents at PT eval functioning below baseline and currently w/ overall mobility deficits 2* to: RLE weakness, impaired balance, decreased endurance, gait deviations, decreased activity tolerance compared to baseline, decreased functional mobility tolerance compared to baseline, decreased safety awareness, fall risk, orthopedic restrictions  Pt currently at a fall risk 2* to impairments listed above  Pt will continue to benefit from skilled acute PT interventions to address stated impairments; to maximize functional mobility; for ongoing pt/ family training; and DME needs  At conclusion of PT session pt returned BTB with phone and call bell within reach  Pt denies any further questions at this time  Recommend home with family care upon hospital D/C  Barriers to Discharge Inaccessible home environment   Goals   Patient Goals "to go home"   [facility] Expiration Date 09/20/20   Short Term Goal #1 In 10 days pt will be able to: 1  Demonstrate ability to perform all aspects of bed mobility independently to increase functional independence  2  Perform functional transfers with RW independently to facilitate safe return to previous living environment  3   Ambulate 15 ft with RW and supervision maintaining NWB with stable vitals to improve safety with household distances and reduce fall risk  4  Bump up/down 20 steps with supervision and 1 HR to simulate entrance to home  5  Improve LE strength grades by 1 to increase ease of functional mobility with transfers and gait  6  Pt will demonstrate improved balance by one grade order to decrease risk of falls  PT Treatment Day 0   Plan   Treatment/Interventions Functional transfer training;LE strengthening/ROM; Elevations; Therapeutic exercise; Endurance training;Patient/family training;Equipment eval/education; Bed mobility;Gait training   PT Frequency Other (Comment)  (3-5x/week)   Recommendation   PT Discharge Recommendation Return to previous environment with social support  (home with family care)   Equipment Recommended Other (Comment)  (knee scooter)   PT - OK to Discharge No  (pending stairs if patient agreeable)   Barthel Index   Feeding 10   Bathing 0   Grooming Score 5   Dressing Score 10   Bladder Score 10   Bowels Score 10   Toilet Use Score 5   Transfers (Bed/Chair) Score 10   Mobility (Level Surface) Score 0   Stairs Score 0   Barthel Index Score 60     Treatment:   08:50-09:07  GT: After short distance ambulation with RW, pt fatigued easily  Pt reports no space for W/C within apartment, discussed use of knee scooter  Pt has utilized knee scooter after R TMA when he was NWB R LE  Pt required assistance for scooter stabilization for transfer from chair onto scooter  PT educated on set-up for ease of transfer  Pt with great control of L LE with prothesis donned, great standing balance on L LE with 1 UE support  Pt educated on use of braking mechanism, short stride with L LE for propulsion, pt able to perform rotational turn ans short retro mobility with knee scooter with CGA, improved to supervision  Pt with good safety awareness  Continued to discuss discharge home and need for stair climbing  Pt refused performance today stating fatigue and "I've done it before, I know what to do "  PT discussed importance of chair placement at top of stairs to bump up from stair --> chair --> standing, pt verbalized understanding and states his SO will be able to set-up  Pt states what PT is describing is similar to prior performance  Will continue attempts to perform during hospital stay if patient agreeable        Evita Patterson, PT, DPT

## 2020-09-10 NOTE — SOCIAL WORK
Copay for knee scooter $8 98 per month  Informed pt whom is agreeable  Asked [facility] to contact pt at his cell [phone] to arrange delivery

## 2020-09-10 NOTE — SOCIAL WORK
Met with pt & explained Cm role  Pt reports he lives with his girlfriend & her mother in 2nd flr apt with flight of steps to enter  Reports was "very [employer]", works FT remotely [employer]  States can "bump up" stairs  Has L BKA prostesis, rw, w/c  H/o vna, but unsure name  H/o IPR in [place]  Denies any MH,D&A tx  [facility]  Emergency contact, girlfriend [phone]  S/w PT & pt recommended to return home  Needs knee scooter  Messaged podiatry  CM reviewed d/c planning process including the following: identifying help at home, patient preference for d/c planning needs, Discharge Lounge, [facility] Meds to Bed program, availability of treatment team to discuss questions or concerns patient and/or family may have regarding understanding medications and recognizing signs and symptoms once discharged  CM also encouraged patient to follow up with all recommended appointments after discharge  Patient advised of importance for patient and family to participate in managing patients medical well being

## 2020-09-11 LAB
ANION GAP SERPL CALCULATED.3IONS-SCNC: 9 MMOL/L (ref 4–13)
BACTERIA SPEC ANAEROBE CULT: NORMAL
BACTERIA WND AEROBE CULT: ABNORMAL
BACTERIA WND AEROBE CULT: ABNORMAL
BUN SERPL-MCNC: 23 MG/DL (ref 5–25)
CALCIUM SERPL-MCNC: 8.5 MG/DL (ref 8.3–10.1)
CHLORIDE SERPL-SCNC: 106 MMOL/L (ref 100–108)
CO2 SERPL-SCNC: 23 MMOL/L (ref 21–32)
CREAT SERPL-MCNC: 1.14 MG/DL (ref 0.6–1.3)
GFR SERPL CREATININE-BSD FRML MDRD: 68 ML/MIN/1.73SQ M
GLUCOSE SERPL-MCNC: 120 MG/DL (ref 65–140)
GLUCOSE SERPL-MCNC: 124 MG/DL (ref 65–140)
GLUCOSE SERPL-MCNC: 125 MG/DL (ref 65–140)
GLUCOSE SERPL-MCNC: 147 MG/DL (ref 65–140)
GLUCOSE SERPL-MCNC: 151 MG/DL (ref 65–140)
GRAM STN SPEC: ABNORMAL
POTASSIUM SERPL-SCNC: 3.9 MMOL/L (ref 3.5–5.3)
SODIUM SERPL-SCNC: 138 MMOL/L (ref 136–145)

## 2020-09-11 PROCEDURE — 80048 BASIC METABOLIC PNL TOTAL CA: CPT | Performed by: STUDENT IN AN ORGANIZED HEALTH CARE EDUCATION/TRAINING PROGRAM

## 2020-09-11 PROCEDURE — 99232 SBSQ HOSP IP/OBS MODERATE 35: CPT | Performed by: PODIATRIST

## 2020-09-11 PROCEDURE — 82948 REAGENT STRIP/BLOOD GLUCOSE: CPT

## 2020-09-11 PROCEDURE — 97605 NEG PRS WND THER DME<=50SQCM: CPT | Performed by: PODIATRIST

## 2020-09-11 PROCEDURE — 99232 SBSQ HOSP IP/OBS MODERATE 35: CPT | Performed by: INTERNAL MEDICINE

## 2020-09-11 RX ADMIN — METRONIDAZOLE 500 MG: 500 TABLET ORAL at 21:06

## 2020-09-11 RX ADMIN — METRONIDAZOLE 500 MG: 500 TABLET ORAL at 05:03

## 2020-09-11 RX ADMIN — Medication 1 APPLICATION: at 09:37

## 2020-09-11 RX ADMIN — NICOTINE 1 PATCH: 14 PATCH TRANSDERMAL at 09:35

## 2020-09-11 RX ADMIN — CEFEPIME HYDROCHLORIDE 2000 MG: 2 INJECTION, POWDER, FOR SOLUTION INTRAVENOUS at 14:30

## 2020-09-11 RX ADMIN — LISINOPRIL 30 MG: 10 TABLET ORAL at 09:35

## 2020-09-11 RX ADMIN — CEFAZOLIN SODIUM 2000 MG: 2 SOLUTION INTRAVENOUS at 03:55

## 2020-09-11 RX ADMIN — GABAPENTIN 300 MG: 300 CAPSULE ORAL at 09:35

## 2020-09-11 RX ADMIN — GABAPENTIN 300 MG: 300 CAPSULE ORAL at 17:35

## 2020-09-11 RX ADMIN — GABAPENTIN 300 MG: 300 CAPSULE ORAL at 21:06

## 2020-09-11 RX ADMIN — HEPARIN SODIUM 5000 UNITS: 5000 INJECTION INTRAVENOUS; SUBCUTANEOUS at 21:06

## 2020-09-11 RX ADMIN — CEFAZOLIN SODIUM 2000 MG: 2 SOLUTION INTRAVENOUS at 11:08

## 2020-09-11 RX ADMIN — ATORVASTATIN CALCIUM 20 MG: 20 TABLET, FILM COATED ORAL at 09:35

## 2020-09-11 RX ADMIN — HEPARIN SODIUM 5000 UNITS: 5000 INJECTION INTRAVENOUS; SUBCUTANEOUS at 05:03

## 2020-09-11 RX ADMIN — INSULIN LISPRO 1 UNITS: 100 INJECTION, SOLUTION INTRAVENOUS; SUBCUTANEOUS at 11:17

## 2020-09-11 RX ADMIN — HEPARIN SODIUM 5000 UNITS: 5000 INJECTION INTRAVENOUS; SUBCUTANEOUS at 14:30

## 2020-09-11 RX ADMIN — METRONIDAZOLE 500 MG: 500 TABLET ORAL at 14:30

## 2020-09-11 RX ADMIN — FAMOTIDINE 20 MG: 20 TABLET ORAL at 09:35

## 2020-09-11 NOTE — PLAN OF CARE
Problem: Potential for Falls  Goal: Patient will remain free of falls  Description: INTERVENTIONS:  - Assess patient frequently for physical needs  -  Identify cognitive and physical deficits and behaviors that affect risk of falls    -  [place] fall precautions as indicated by assessment   - Educate patient/family on patient safety including physical limitations  - Instruct patient to call for assistance with activity based on assessment  - Modify environment to reduce risk of injury  - Consider OT/PT consult to assist with strengthening/mobility  Outcome: Progressing     Problem: PAIN - ADULT  Goal: Verbalizes/displays adequate comfort level or baseline comfort level  Description: Interventions:  - Encourage patient to monitor pain and request assistance  - Assess pain using appropriate pain scale  - Administer analgesics based on type and severity of pain and evaluate response  - Implement non-pharmacological measures as appropriate and evaluate response  - Consider cultural and social influences on pain and pain management  - Notify physician/advanced practitioner if interventions unsuccessful or patient reports new pain  Outcome: Progressing     Problem: INFECTION - ADULT  Goal: Absence or prevention of progression during hospitalization  Description: INTERVENTIONS:  - Assess and monitor for signs and symptoms of infection  - Monitor lab/diagnostic results  - Monitor all insertion sites, i e  indwelling lines, tubes, and drains  - Monitor endotracheal if appropriate and nasal secretions for changes in amount and color  - Jeff appropriate cooling/warming therapies per order  - Administer medications as ordered  - Instruct and encourage patient and family to use good hand hygiene technique  - Identify and instruct in appropriate isolation precautions for identified infection/condition  Outcome: Progressing  Goal: Absence of fever/infection during neutropenic period  Description: INTERVENTIONS:  - Monitor WBC    Outcome: Progressing     Problem: SAFETY ADULT  Goal: Patient will remain free of falls  Description: INTERVENTIONS:  - Assess patient frequently for physical needs  -  Identify cognitive and physical deficits and behaviors that affect risk of falls    -  [place] fall precautions as indicated by assessment   - Educate patient/family on patient safety including physical limitations  - Instruct patient to call for assistance with activity based on assessment  - Modify environment to reduce risk of injury  - Consider OT/PT consult to assist with strengthening/mobility  Outcome: Progressing  Goal: Maintain or return to baseline ADL function  Description: INTERVENTIONS:  -  Assess patient's ability to carry out ADLs; assess patient's baseline for ADL function and identify physical deficits which impact ability to perform ADLs (bathing, care of mouth/teeth, toileting, grooming, dressing, etc )  - Assess/evaluate cause of self-care deficits   - Assess range of motion  - Assess patient's mobility; develop plan if impaired  - Assess patient's need for assistive devices and provide as appropriate  - Encourage maximum independence but intervene and supervise when necessary  - Involve family in performance of ADLs  - Assess for home care needs following discharge   - Consider OT consult to assist with ADL evaluation and planning for discharge  - Provide patient education as appropriate  Outcome: Progressing  Goal: Maintain or return mobility status to optimal level  Description: INTERVENTIONS:  - Assess patient's baseline mobility status (ambulation, transfers, stairs, etc )    - Identify cognitive and physical deficits and behaviors that affect mobility  - Identify mobility aids required to assist with transfers and/or ambulation (gait belt, sit-to-stand, lift, walker, cane, etc )  - [place] fall precautions as indicated by assessment  - Record patient progress and toleration of activity level on Mobility SBAR; progress patient to next Phase/Stage  - Instruct patient to call for assistance with activity based on assessment  - Consider rehabilitation consult to assist with strengthening/weightbearing, etc   Outcome: Progressing     Problem: DISCHARGE PLANNING  Goal: Discharge to home or other facility with appropriate resources  Description: INTERVENTIONS:  - Identify barriers to discharge w/patient and caregiver  - Arrange for needed discharge resources and transportation as appropriate  - Identify discharge learning needs (meds, wound care, etc )  - Arrange for interpretive services to assist at discharge as needed  - Refer to Case Management Department for coordinating discharge planning if the patient needs post-hospital services based on physician/advanced practitioner order or complex needs related to functional status, cognitive ability, or social support system  Outcome: Progressing     Problem: Knowledge Deficit  Goal: Patient/family/caregiver demonstrates understanding of disease process, treatment plan, medications, and discharge instructions  Description: Complete learning assessment and assess knowledge base    Interventions:  - Provide teaching at level of understanding  - Provide teaching via preferred learning methods  Outcome: Progressing     Problem: Prexisting or High Potential for Compromised Skin Integrity  Goal: Skin integrity is maintained or improved  Description: INTERVENTIONS:  - Identify patients at risk for skin breakdown  - Assess and monitor skin integrity  - Assess and monitor nutrition and hydration status  - Monitor labs   - Assess for incontinence   - Turn and reposition patient  - Assist with mobility/ambulation  - Relieve pressure over bony prominences  - Avoid friction and shearing  - Provide appropriate hygiene as needed including keeping skin clean and dry  - Evaluate need for skin moisturizer/barrier cream  - Collaborate with interdisciplinary team   - Patient/family teaching  - Consider wound care consult   Outcome: Progressing

## 2020-09-11 NOTE — CASE MANAGEMENT
[facility] can accept  They need wound care orders entered in [facility]  [facility] accepted for knee scooter & are arranging delivery with pt

## 2020-09-11 NOTE — ASSESSMENT & PLAN NOTE
Lab Results   Component Value Date    HGBA1C 6 4 (H) 07/02/2020       Recent Labs     09/10/20  1613 09/10/20  2140 09/11/20  0738 09/11/20  1059   POCGLU 192* 148* 124 151*       Blood Sugar Average: Last 72 hrs:  (P) 143     Hold metformin while inpatient  Acceptable blood sugar  Continue with insulin sliding scale  Monitor

## 2020-09-11 NOTE — PROGRESS NOTES
Progress Note - Infectious Disease   Samuel Murry 63 y o  male MRN: [id number]  Unit/Bed#: [facility] 827-01 Encounter: [id number]      Impression/Plan:    47-year-old male patient with diabetes mellitus type 2, peripheral neuropathy, history of left BKA and right foot TMA, admitted for management of right foot ulceration     1- infected diabetic foot ulcer:  Patient with 2 ulcerations in place, one over the Achillis tendon and one over lateral plantar aspect of right foot, at site of TMA  No fever, patient remains hemodynamically stable  Wound culture growing gram-negative rods  X-ray negative for osteomyelitis  -  follow-up result of MRI right foot to rule out underlying osteomyelitis  -  wound culture positive for Enterobacter cloaca, start cefepime IV, continue Flagyl p o   - follow-up final result of right foot wound culture and adjust antibiotics accordingly  - further plan will depend on MRI results  - podiatry follow-up for wound VAC management, wound VAC was applied today     2- diabetes mellitus type 2 and peripheral neuropathy:  Risk factor for poor wound healing and infectious complication diabetic ulcers  - management as per Internal Medicine team     3- hypertension     4- smoker:  Risk factor for poor wound healing  - encouraged  smoking cessation    5- history of left BKA     Plan mentioned above discussed with patient and with primary service provider    Subjective:  Patient has no fever, chills, sweats; no nausea, vomiting, diarrhea; no cough, shortness of breath; no pain  No new symptoms  Objective:  Vitals:  Temp:  [97 4 °F (36 3 °C)-98 4 °F (36 9 °C)] 97 4 °F (36 3 °C)  HR:  [55-64] 55  Resp:  [16-18] 16  BP: (113-144)/(57-62) 113/57  SpO2:  [95 %-98 %] 95 %  Temp (24hrs), Av 9 °F (36 6 °C), Min:97 4 °F (36 3 °C), Max:98 4 °F (36 9 °C)  Current: Temperature: (!) 97 4 °F (36 3 °C)    Physical Exam:   General Appearance:  Alert, interactive, nontoxic, no acute distress     Throat: Oropharynx moist without lesions  Lungs:   Clear to auscultation bilaterally; no wheezes, rhonchi or rales; respirations unlabored   Heart:  RRR; no murmur, rub or gallop   Abdomen:   Soft, non-tender, non-distended, positive bowel sounds  Extremities: No clubbing, cyanosis or edema  Wound VAC dressing over right foot in place   Skin: No new rashes or lesions  Labs, Imaging, & Other studies:   All pertinent labs and imaging studies were personally reviewed  Results from last 7 days   Lab Units 09/10/20  0529 09/09/20  1850   WBC Thousand/uL 6 60 8 58   HEMOGLOBIN g/dL 13 3 14 5   PLATELETS Thousands/uL 204 227     Results from last 7 days   Lab Units 09/11/20  0536 09/10/20  0529 09/09/20  1850   SODIUM mmol/L 138 137 139   POTASSIUM mmol/L 3 9 3 7 4 3   CHLORIDE mmol/L 106 105 106   CO2 mmol/L 23 25 27   BUN mg/dL 23 20 24   CREATININE mg/dL 1 14 1 14 1 11   EGFR ml/min/1 73sq m 68 68 70   CALCIUM mg/dL 8 5 8 5 9 6   AST U/L  --   --  18   ALT U/L  --   --  25   ALK PHOS U/L  --   --  88     Results from last 7 days   Lab Units 09/09/20  1937 09/09/20  1832   BLOOD CULTURE  No Growth at 24 hrs    No Growth at 24 hrs   --    GRAM STAIN RESULT   --  No Polys or Bacteria seen   WOUND CULTURE   --  1+ Growth of Enterobacter cloacae complex*  Few Colonies of Staphylococcus coagulase negative*         Results from last 7 days   Lab Units 09/09/20  1850   CRP mg/L 14 8*

## 2020-09-11 NOTE — ASSESSMENT & PLAN NOTE
Right lateral foot ulceration  Right distal posterior leg ulceration   History of left leg BKA  History of right foot TMA  Status post bedside I&D by Podiatry on 9/9  Negative x-ray for osteomyelitis  MRI was ordered  On IV antibiotic per ID  Management per primary

## 2020-09-11 NOTE — CASE MANAGEMENT
Notified by podiatry that wound vac papers are completed & in pt folder  Uploaded into ecin & faxed to CM office  Pt will use [facility]ANKSA

## 2020-09-11 NOTE — CASE MANAGEMENT
Informed by podiatry that pt will need [facility] for wound care  S/w pt & he requested referral to  [facility]NA  Referral sent  [facility] needed new script for knee scooter with [employer] certified signature  Podiatry provided new script today  Uploaded & sent to [facility]  Per podiatry, pt will need wound vac & aware papers are in pt folder  Unsure when pt will dc at this time  Cm to follow

## 2020-09-11 NOTE — PROGRESS NOTES
Podiatry - Progress Note  Patient: Jewels Andrews 63 y o  male   MRN: [id number]  PCP: Catrachita Scott DO  [facility] Encounter: [id number]  Date Of Visit: 09/10/20    ASSESSMENT:    Jewels Andrews is a 63 y o  male with:    1  Right lateral foot ulceration - Almanzar 3 - POA  2  Right distal posterior leg ulceration - Almanzar 1 - POA  3  Type 2 diabetes with peripheral neuropathy  4  History of left leg BKA  5  History of right foot TMA  6  Obesity    PLAN:    · Wound vac was applied today  · MRI final result shows osteomyelites of the residual 5th metatarsal   · Appreciate internal medicine pre-op clearance and medical management  · Appreciate ID antibiotic recommendation: cefepime IV, Flagyl p o  · NWB to right foot  · PT and OT consult was reviewed: return home with family care  · Will discuss plan with attending and update as needed  Wound VAC application  1  Number of sponges: 4 black sponges  2  Pressure settin continuous  3  Size of wounds: 4 5 cm x 3 cm x 0 6 cm , 0 9 cm x 0 8 cm x 0 2 cm     4  Description of wound: fibrous red/ pink base wounds    Timeout was performed prior to application of wound vac  Nurse was informed of wound vac application as well as number of black sponges  SUBJECTIVE:     The patient was seen, evaluated, and assessed at bedside today  The patient was awake, alert, and in no acute distress  No acute events overnight  The patient reports that he slept well last night  Patient denies N/V/F/chills/SOB/CP  OBJECTIVE:     Vitals:   /58   Pulse 59   Temp 98 4 °F (36 9 °C)   Resp 18   Ht 5' 4" (1 626 m)   Wt 85 3 kg (188 lb)   SpO2 98%   BMI 32 27 kg/m²     Temp (24hrs), Av 3 °F (36 8 °C), Min:98 °F (36 7 °C), Max:98 6 °F (37 °C)      Physical Exam:     General:  Alert, cooperative, and in no distress  Lower extremity exam:  Cardiovascular status at baseline  Neurological status at baseline  Musculoskeletal status at baseline   No calf tenderness noted  Lower extremity wound(s) as noted below:    Wound 1 located right distal lateral forefoot, measures 4 5 cm x 3 cm x 0 6 cm  slight undermining noted along the distal and plantar select of surrounding hyperkeratotic lesion  Wound bed appears granular and fibrotic with light Serosanguinous exudate  Deepest tissue noted periosteum  Malodor is not noticed  Wound edge appears Attached  Lydia-wound skin appears intact  Probe to bone is,  negative but it probs to periosteum  Signs of infection are not present at this time  Wound 2 located right distal posterior leg, measures 0 9 cm x 0 8 cm x 0 2 cm  without sinus tracking or undermining  Wound bed appears pink/red and fibrotic with slight exudate  Deepest tissue noted subq  Malodor is not noticed  Wound edge appears Attached  Lydia-wound skin appears intact  Probe to bone is,  negative   Signs of infection are not present at this time  Additional Data:     Labs:    Results from last 7 days   Lab Units 09/10/20  0529   WBC Thousand/uL 6 60   HEMOGLOBIN g/dL 13 3   HEMATOCRIT % 38 0   PLATELETS Thousands/uL 204   NEUTROS PCT % 61   LYMPHS PCT % 24   MONOS PCT % 10   EOS PCT % 3     Results from last 7 days   Lab Units 09/10/20  0529 09/09/20  1850   POTASSIUM mmol/L 3 7 4 3   CHLORIDE mmol/L 105 106   CO2 mmol/L 25 27   BUN mg/dL 20 24   CREATININE mg/dL 1 14 1 11   CALCIUM mg/dL 8 5 9 6   ALK PHOS U/L  --  88   ALT U/L  --  25   AST U/L  --  18           * I Have Reviewed All Lab Data Listed Above  Recent Cultures (last 7 days):     Results from last 7 days   Lab Units 09/09/20  1937 09/09/20  1832   BLOOD CULTURE  No Growth at 24 hrs  No Growth at 24 hrs   --    GRAM STAIN RESULT   --  No Polys or Bacteria seen   WOUND CULTURE   --  1+ Growth of Gram Negative Rangel Enteric Like*     Results from last 7 days   Lab Units 09/09/20  1832   ANAEROBIC CULTURE  Culture results to follow         Imaging: I have personally reviewed pertinent films in PACS  EKG, Pathology, and Other Studies: I have personally reviewed pertinent reports  ** Please Note: Portions of the record may have been created with voice recognition software  Occasional wrong word or "sound a like" substitutions may have occurred due to the inherent limitations of voice recognition software  Read the chart carefully and recognize, using context, where substitutions have occurred   **

## 2020-09-11 NOTE — PROGRESS NOTES
Progress Note - Grace Bueno 1955, 63 y o  male MRN: [id number]    [facility] Encounter: [id number]    Primary Care Provider: Luz Maria Ruffin DO   Date and time admitted to hospital: 9/9/2020  5:16 PM        Type 2 diabetes mellitus with diabetic polyneuropathy [facility])  Assessment & Plan  Lab Results   Component Value Date    HGBA1C 6 4 (H) 07/02/2020       Recent Labs     09/10/20  1613 09/10/20  2140 09/11/20  0738 09/11/20  1059   POCGLU 192* 148* 124 151*       Blood Sugar Average: Last 72 hrs:  (P) 143     Hold metformin while inpatient  Acceptable blood sugar  Continue with insulin sliding scale  Monitor    Essential hypertension  Assessment & Plan  Acceptable BP  Continue lisinopril    * Ulcer of right foot with necrosis of muscle (HCC)  Assessment & Plan  Right lateral foot ulceration  Right distal posterior leg ulceration   History of left leg BKA  History of right foot TMA  Status post bedside I&D by Podiatry on 9/9  Negative x-ray for osteomyelitis  MRI was ordered  On IV antibiotic per ID  Management per primary    GERD (gastroesophageal reflux disease)  Assessment & Plan  Restart Pepcid 20 mg daily    Pipe smoker  Assessment & Plan  Continue nicotine patch         VTE Pharmacologic Prophylaxis:   Pharmacologic: Heparin  Mechanical VTE Prophylaxis in Place: Yes    Patient Centered Rounds: I have performed bedside rounds with nursing staff today  Discussions with Specialists or Other Care Team Provider:     Education and Discussions with Family / Patient:  Patient    Time Spent for Care: 30 minutes  More than 50% of total time spent on counseling and coordination of care as described above      Current Length of Stay: 2 day(s)    Current Patient Status: Inpatient   Certification Statement: The patient will continue to require additional inpatient hospital stay due to Above      Code Status: Level 1 - Full Code      Subjective:   Patient seen and examined  Comfortable in bed  No chest pain or shortness of breath  Tolerating oral diet    Objective:     Vitals:   Temp (24hrs), Av 9 °F (36 6 °C), Min:97 4 °F (36 3 °C), Max:98 4 °F (36 9 °C)    Temp:  [97 4 °F (36 3 °C)-98 4 °F (36 9 °C)] 97 4 °F (36 3 °C)  HR:  [55-64] 55  Resp:  [16-18] 16  BP: (113-144)/(57-62) 113/57  SpO2:  [95 %-98 %] 95 %  Body mass index is 32 27 kg/m²  Input and Output Summary (last 24 hours): Intake/Output Summary (Last 24 hours) at 2020 1236  Last data filed at 2020 0901  Gross per 24 hour   Intake 500 ml   Output 2975 ml   Net -2475 ml       Physical Exam:     Physical Exam  Patient is awake alert oriented in no acute distress  Lung clear to auscultation bilateral  Heart positive S1-S2 no murmur  Abdomen soft nontender  Left BKA  Status post right foot TMA  Wound dressing in place    Additional Data:     Labs:    Results from last 7 days   Lab Units 09/10/20  0529   WBC Thousand/uL 6 60   HEMOGLOBIN g/dL 13 3   HEMATOCRIT % 38 0   PLATELETS Thousands/uL 204   NEUTROS PCT % 61   LYMPHS PCT % 24   MONOS PCT % 10   EOS PCT % 3     Results from last 7 days   Lab Units 20  0536  20  1850   POTASSIUM mmol/L 3 9   < > 4 3   CHLORIDE mmol/L 106   < > 106   CO2 mmol/L 23   < > 27   BUN mg/dL 23   < > 24   CREATININE mg/dL 1 14   < > 1 11   CALCIUM mg/dL 8 5   < > 9 6   ALK PHOS U/L  --   --  88   ALT U/L  --   --  25   AST U/L  --   --  18    < > = values in this interval not displayed  * I Have Reviewed All Lab Data Listed Above  * Additional Pertinent Lab Tests Reviewed: Latanya 68 Admission Reviewed    Imaging:    Imaging Reports Reviewed Today Include:   Imaging Personally Reviewed by Myself Includes:    Recent Cultures (last 7 days):     Results from last 7 days   Lab Units 20  1937 20  1832   BLOOD CULTURE  No Growth at 24 hrs    No Growth at 24 hrs   --    GRAM STAIN RESULT   --  No Polys or Bacteria seen   WOUND CULTURE   --  1+ Growth of Enterobacter cloacae complex*  Few Colonies of Staphylococcus coagulase negative*       Last 24 Hours Medication List:   Current Facility-Administered Medications   Medication Dose Route Frequency Provider Last Rate    acetaminophen  650 mg Oral Q6H PRN Itz Ugalde, CHAGO      atorvastatin  20 mg Oral Daily Itz Ugalde DPM      cefazolin  2,000 mg Intravenous Q8H ADINA TranM 2,000 mg (09/11/20 1108)    Dakins (full strength)  1 application Irrigation Daily Itz Ugalde DPM      famotidine  20 mg Oral Daily Christiane Guzman DO      gabapentin  300 mg Oral TID [facility], DPLACIE      heparin (porcine)  5,000 Units Subcutaneous [facility] Ross Saldana DPM      insulin lispro  1-6 Units Subcutaneous TID AC Ross Saldana DPM      insulin lispro  1-6 Units Subcutaneous HS Ross Saldana DPM      lisinopril  30 mg Oral Daily Itz Ugalde DPM      metroNIDAZOLE  500 mg Oral [facility] Itz Ugalde DPM      nicotine  1 patch Transdermal Daily Itz Ugalde DPM          Today, Patient Was Seen By: Christiane Guzman DO    ** Please Note: This note has been constructed using a voice recognition system   **

## 2020-09-12 LAB
ANION GAP SERPL CALCULATED.3IONS-SCNC: 6 MMOL/L (ref 4–13)
BACTERIA UR QL AUTO: NORMAL /HPF
BILIRUB UR QL STRIP: NEGATIVE
BUN SERPL-MCNC: 26 MG/DL (ref 5–25)
CALCIUM SERPL-MCNC: 8.5 MG/DL (ref 8.3–10.1)
CHLORIDE SERPL-SCNC: 108 MMOL/L (ref 100–108)
CLARITY UR: CLEAR
CO2 SERPL-SCNC: 23 MMOL/L (ref 21–32)
COLOR UR: YELLOW
CREAT SERPL-MCNC: 1 MG/DL (ref 0.6–1.3)
GFR SERPL CREATININE-BSD FRML MDRD: 80 ML/MIN/1.73SQ M
GLUCOSE SERPL-MCNC: 119 MG/DL (ref 65–140)
GLUCOSE SERPL-MCNC: 125 MG/DL (ref 65–140)
GLUCOSE SERPL-MCNC: 125 MG/DL (ref 65–140)
GLUCOSE SERPL-MCNC: 154 MG/DL (ref 65–140)
GLUCOSE SERPL-MCNC: 154 MG/DL (ref 65–140)
GLUCOSE UR STRIP-MCNC: NEGATIVE MG/DL
HGB UR QL STRIP.AUTO: NEGATIVE
HYALINE CASTS #/AREA URNS LPF: NORMAL /LPF
KETONES UR STRIP-MCNC: NEGATIVE MG/DL
LEUKOCYTE ESTERASE UR QL STRIP: NEGATIVE
NITRITE UR QL STRIP: NEGATIVE
NON-SQ EPI CELLS URNS QL MICRO: NORMAL /HPF
PH UR STRIP.AUTO: 6 [PH]
POTASSIUM SERPL-SCNC: 4.2 MMOL/L (ref 3.5–5.3)
PROT UR STRIP-MCNC: NEGATIVE MG/DL
RBC #/AREA URNS AUTO: NORMAL /HPF
SODIUM SERPL-SCNC: 137 MMOL/L (ref 136–145)
SP GR UR STRIP.AUTO: 1.01 (ref 1–1.03)
UROBILINOGEN UR QL STRIP.AUTO: 0.2 E.U./DL
WBC #/AREA URNS AUTO: NORMAL /HPF

## 2020-09-12 PROCEDURE — 80048 BASIC METABOLIC PNL TOTAL CA: CPT | Performed by: STUDENT IN AN ORGANIZED HEALTH CARE EDUCATION/TRAINING PROGRAM

## 2020-09-12 PROCEDURE — 99232 SBSQ HOSP IP/OBS MODERATE 35: CPT | Performed by: INTERNAL MEDICINE

## 2020-09-12 PROCEDURE — 99232 SBSQ HOSP IP/OBS MODERATE 35: CPT | Performed by: PODIATRIST

## 2020-09-12 PROCEDURE — 81001 URINALYSIS AUTO W/SCOPE: CPT | Performed by: PODIATRIST

## 2020-09-12 PROCEDURE — 82948 REAGENT STRIP/BLOOD GLUCOSE: CPT

## 2020-09-12 RX ORDER — INSULIN GLARGINE 100 [IU]/ML
10 INJECTION, SOLUTION SUBCUTANEOUS
Status: DISCONTINUED | OUTPATIENT
Start: 2020-09-12 | End: 2020-09-15 | Stop reason: HOSPADM

## 2020-09-12 RX ADMIN — CEFEPIME HYDROCHLORIDE 2000 MG: 2 INJECTION, POWDER, FOR SOLUTION INTRAVENOUS at 13:36

## 2020-09-12 RX ADMIN — FAMOTIDINE 20 MG: 20 TABLET ORAL at 08:01

## 2020-09-12 RX ADMIN — INSULIN LISPRO 1 UNITS: 100 INJECTION, SOLUTION INTRAVENOUS; SUBCUTANEOUS at 11:32

## 2020-09-12 RX ADMIN — GABAPENTIN 300 MG: 300 CAPSULE ORAL at 21:55

## 2020-09-12 RX ADMIN — INSULIN LISPRO 1 UNITS: 100 INJECTION, SOLUTION INTRAVENOUS; SUBCUTANEOUS at 16:37

## 2020-09-12 RX ADMIN — GABAPENTIN 300 MG: 300 CAPSULE ORAL at 08:01

## 2020-09-12 RX ADMIN — METRONIDAZOLE 500 MG: 500 TABLET ORAL at 13:36

## 2020-09-12 RX ADMIN — HEPARIN SODIUM 5000 UNITS: 5000 INJECTION INTRAVENOUS; SUBCUTANEOUS at 21:54

## 2020-09-12 RX ADMIN — LISINOPRIL 30 MG: 10 TABLET ORAL at 08:00

## 2020-09-12 RX ADMIN — GABAPENTIN 300 MG: 300 CAPSULE ORAL at 16:37

## 2020-09-12 RX ADMIN — METRONIDAZOLE 500 MG: 500 TABLET ORAL at 21:55

## 2020-09-12 RX ADMIN — CEFEPIME HYDROCHLORIDE 2000 MG: 2 INJECTION, POWDER, FOR SOLUTION INTRAVENOUS at 01:48

## 2020-09-12 RX ADMIN — HEPARIN SODIUM 5000 UNITS: 5000 INJECTION INTRAVENOUS; SUBCUTANEOUS at 05:04

## 2020-09-12 RX ADMIN — ATORVASTATIN CALCIUM 20 MG: 20 TABLET, FILM COATED ORAL at 08:01

## 2020-09-12 RX ADMIN — HEPARIN SODIUM 5000 UNITS: 5000 INJECTION INTRAVENOUS; SUBCUTANEOUS at 13:36

## 2020-09-12 RX ADMIN — INSULIN GLARGINE 10 UNITS: 100 INJECTION, SOLUTION SUBCUTANEOUS at 21:54

## 2020-09-12 RX ADMIN — METRONIDAZOLE 500 MG: 500 TABLET ORAL at 05:04

## 2020-09-12 RX ADMIN — NICOTINE 1 PATCH: 14 PATCH TRANSDERMAL at 08:00

## 2020-09-12 NOTE — PROGRESS NOTES
Progress Note - Tamia Greenwood 1955, 63 y o  male MRN: [id number]    Unit/Bed#: [facility] 827-01 Encounter: [id number]    Primary Care Provider: Beryl Centeno DO   Date and time admitted to hospital: 9/9/2020  5:16 PM        Type 2 diabetes mellitus with diabetic polyneuropathy [facility])  Assessment & Plan  Lab Results   Component Value Date    HGBA1C 6 4 (H) 07/02/2020       Recent Labs     09/11/20  1059 09/11/20  1613 09/11/20  2048 09/12/20  0757   POCGLU 151* 120 147* 119       Blood Sugar Average: Last 72 hrs:  (P) 139 [id number]     Hold metformin while inpatient  Add Lantus q h s  Continue with insulin sliding scale  Monitor    Essential hypertension  Assessment & Plan  Acceptable BP  Continue lisinopril    * Ulcer of right foot with necrosis of muscle (HCC)  Assessment & Plan  Right lateral foot ulceration  Right distal posterior leg ulceration   History of left leg BKA  History of right foot TMA  Status post bedside I&D by Podiatry on 9/9  Negative x-ray for osteomyelitis  MRI findings consistent with osteomyelitis of the residual 5th metatarsal   Overlying plantar-lateral soft tissue ulcer  No drainable abscess  On IV antibiotic per ID  Management per primary    GERD (gastroesophageal reflux disease)  Assessment & Plan  Continue Pepcid 20 mg daily    Pipe smoker  Assessment & Plan  Continue nicotine patch         VTE Pharmacologic Prophylaxis:   Pharmacologic: Heparin  Mechanical VTE Prophylaxis in Place: Yes    Patient Centered Rounds: I have performed bedside rounds with nursing staff today  Discussions with Specialists or Other Care Team Provider:     Education and Discussions with Family / Patient:  Patient    Time Spent for Care: 30 minutes  More than 50% of total time spent on counseling and coordination of care as described above      Current Length of Stay: 3 day(s)    Current Patient Status: Inpatient   Certification Statement: The patient will continue to require additional inpatient hospital stay due to Above      Code Status: Level 1 - Full Code      Subjective:   Patient seen and examined  Comfortable in bed  No complains    Objective:     Vitals:   Temp (24hrs), Av 6 °F (36 4 °C), Min:97 °F (36 1 °C), Max:98 1 °F (36 7 °C)    Temp:  [97 °F (36 1 °C)-98 1 °F (36 7 °C)] 97 °F (36 1 °C)  HR:  [56-64] 56  Resp:  [15] 15  BP: (140-153)/(74-78) 153/78  SpO2:  [95 %-96 %] 96 %  Body mass index is 32 27 kg/m²  Input and Output Summary (last 24 hours): Intake/Output Summary (Last 24 hours) at 2020 1049  Last data filed at 2020 1030  Gross per 24 hour   Intake 830 ml   Output 3675 ml   Net -2845 ml       Physical Exam:     Physical Exam    Patient is awake alert oriented in no acute distress  Lung clear to auscultation bilateral  Heart positive S1-S2 no murmur  Abdomen soft nontender  Left BKA  Status post right foot TMA  Wound VAC dressing in place  Additional Data:     Labs:    Results from last 7 days   Lab Units 09/10/20  0529   WBC Thousand/uL 6 60   HEMOGLOBIN g/dL 13 3   HEMATOCRIT % 38 0   PLATELETS Thousands/uL 204   NEUTROS PCT % 61   LYMPHS PCT % 24   MONOS PCT % 10   EOS PCT % 3     Results from last 7 days   Lab Units 20  0458  20  1850   POTASSIUM mmol/L 4 2   < > 4 3   CHLORIDE mmol/L 108   < > 106   CO2 mmol/L 23   < > 27   BUN mg/dL 26*   < > 24   CREATININE mg/dL 1 00   < > 1 11   CALCIUM mg/dL 8 5   < > 9 6   ALK PHOS U/L  --   --  88   ALT U/L  --   --  25   AST U/L  --   --  18    < > = values in this interval not displayed  * I Have Reviewed All Lab Data Listed Above  * Additional Pertinent Lab Tests Reviewed: Latanya 68 Admission Reviewed    Imaging:    Imaging Reports Reviewed Today Include:   Imaging Personally Reviewed by Myself Includes:      Recent Cultures (last 7 days):     Results from last 7 days   Lab Units 20  1937 20  1832   BLOOD CULTURE  No Growth at 48 hrs    No Growth at 48 hrs   --    GRAM STAIN RESULT   --  No Polys or Bacteria seen   WOUND CULTURE   --  1+ Growth of Enterobacter cloacae complex*  Few Colonies of Staphylococcus coagulase negative*       Last 24 Hours Medication List:   Current Facility-Administered Medications   Medication Dose Route Frequency Provider Last Rate    acetaminophen  650 mg Oral Q6H PRN Lucrezia Harrisburg, DPM      atorvastatin  20 mg Oral Daily Lucrezia Harrisburg, DPM      cefepime  2,000 mg Intravenous Q12H Victor Manuel Lazar MD Stopped (09/12/20 1453)    Dakins (full strength)  1 application Irrigation Daily Lucrezia Harrisburg, DPM      famotidine  20 mg Oral Daily Ellen De La Fuente DO      gabapentin  300 mg Oral TID [facility], DPLACIE      heparin (porcine)  5,000 Units Subcutaneous [facility] Ross Mishko, DPM      insulin lispro  1-6 Units Subcutaneous TID AC Ross Mishko, DPM      insulin lispro  1-6 Units Subcutaneous HS Ross Shana, DPM      lisinopril  30 mg Oral Daily Lucrezia Harrisburg, DPM      metroNIDAZOLE  500 mg Oral [facility] Lucrezia Harrisburg, DPM      nicotine  1 patch Transdermal Daily Lucrezia Harrisburg, DPM          Today, Patient Was Seen By: Ellen De La Fuente DO    ** Please Note: This note has been constructed using a voice recognition system   **

## 2020-09-12 NOTE — PLAN OF CARE
Problem: Potential for Falls  Goal: Patient will remain free of falls  Description: INTERVENTIONS:  - Assess patient frequently for physical needs  -  Identify cognitive and physical deficits and behaviors that affect risk of falls    -  Keno fall precautions as indicated by assessment   - Educate patient/family on patient safety including physical limitations  - Instruct patient to call for assistance with activity based on assessment  - Modify environment to reduce risk of injury  - Consider OT/PT consult to assist with strengthening/mobility  Outcome: Progressing     Problem: PAIN - ADULT  Goal: Verbalizes/displays adequate comfort level or baseline comfort level  Description: Interventions:  - Encourage patient to monitor pain and request assistance  - Assess pain using appropriate pain scale  - Administer analgesics based on type and severity of pain and evaluate response  - Implement non-pharmacological measures as appropriate and evaluate response  - Consider cultural and social influences on pain and pain management  - Notify physician/advanced practitioner if interventions unsuccessful or patient reports new pain  Outcome: Progressing     Problem: INFECTION - ADULT  Goal: Absence or prevention of progression during hospitalization  Description: INTERVENTIONS:  - Assess and monitor for signs and symptoms of infection  - Monitor lab/diagnostic results  - Monitor all insertion sites, i e  indwelling lines, tubes, and drains  - Monitor endotracheal if appropriate and nasal secretions for changes in amount and color  - Keno appropriate cooling/warming therapies per order  - Administer medications as ordered  - Instruct and encourage patient and family to use good hand hygiene technique  - Identify and instruct in appropriate isolation precautions for identified infection/condition  Outcome: Progressing  Goal: Absence of fever/infection during neutropenic period  Description: INTERVENTIONS:  - Monitor WBC    Outcome: Progressing     Problem: SAFETY ADULT  Goal: Patient will remain free of falls  Description: INTERVENTIONS:  - Assess patient frequently for physical needs  -  Identify cognitive and physical deficits and behaviors that affect risk of falls    -  Leon fall precautions as indicated by assessment   - Educate patient/family on patient safety including physical limitations  - Instruct patient to call for assistance with activity based on assessment  - Modify environment to reduce risk of injury  - Consider OT/PT consult to assist with strengthening/mobility  Outcome: Progressing  Goal: Maintain or return to baseline ADL function  Description: INTERVENTIONS:  -  Assess patient's ability to carry out ADLs; assess patient's baseline for ADL function and identify physical deficits which impact ability to perform ADLs (bathing, care of mouth/teeth, toileting, grooming, dressing, etc )  - Assess/evaluate cause of self-care deficits   - Assess range of motion  - Assess patient's mobility; develop plan if impaired  - Assess patient's need for assistive devices and provide as appropriate  - Encourage maximum independence but intervene and supervise when necessary  - Involve family in performance of ADLs  - Assess for home care needs following discharge   - Consider OT consult to assist with ADL evaluation and planning for discharge  - Provide patient education as appropriate  Outcome: Progressing  Goal: Maintain or return mobility status to optimal level  Description: INTERVENTIONS:  - Assess patient's baseline mobility status (ambulation, transfers, stairs, etc )    - Identify cognitive and physical deficits and behaviors that affect mobility  - Identify mobility aids required to assist with transfers and/or ambulation (gait belt, sit-to-stand, lift, walker, cane, etc )  - Leon fall precautions as indicated by assessment  - Record patient progress and toleration of activity level on Mobility SBAR; progress patient to next Phase/Stage  - Instruct patient to call for assistance with activity based on assessment  - Consider rehabilitation consult to assist with strengthening/weightbearing, etc   Outcome: Progressing     Problem: DISCHARGE PLANNING  Goal: Discharge to home or other facility with appropriate resources  Description: INTERVENTIONS:  - Identify barriers to discharge w/patient and caregiver  - Arrange for needed discharge resources and transportation as appropriate  - Identify discharge learning needs (meds, wound care, etc )  - Arrange for interpretive services to assist at discharge as needed  - Refer to Case Management Department for coordinating discharge planning if the patient needs post-hospital services based on physician/advanced practitioner order or complex needs related to functional status, cognitive ability, or social support system  Outcome: Progressing     Problem: Knowledge Deficit  Goal: Patient/family/caregiver demonstrates understanding of disease process, treatment plan, medications, and discharge instructions  Description: Complete learning assessment and assess knowledge base    Interventions:  - Provide teaching at level of understanding  - Provide teaching via preferred learning methods  Outcome: Progressing     Problem: Prexisting or High Potential for Compromised Skin Integrity  Goal: Skin integrity is maintained or improved  Description: INTERVENTIONS:  - Identify patients at risk for skin breakdown  - Assess and monitor skin integrity  - Assess and monitor nutrition and hydration status  - Monitor labs   - Assess for incontinence   - Turn and reposition patient  - Assist with mobility/ambulation  - Relieve pressure over bony prominences  - Avoid friction and shearing  - Provide appropriate hygiene as needed including keeping skin clean and dry  - Evaluate need for skin moisturizer/barrier cream  - Collaborate with interdisciplinary team   - Patient/family teaching  - Consider wound care consult   Outcome: Progressing

## 2020-09-12 NOTE — ASSESSMENT & PLAN NOTE
Lab Results   Component Value Date    HGBA1C 6 4 (H) 07/02/2020       Recent Labs     09/11/20  1059 09/11/20  1613 09/11/20  2048 09/12/20  0757   POCGLU 151* 120 147* 119       Blood Sugar Average: Last 72 hrs:  (P) 783 [id number]     Hold metformin while inpatient  Add Lantus q h s    Continue with insulin sliding scale  Monitor

## 2020-09-12 NOTE — PROGRESS NOTES
[facility] - Progress Note  Patient: Wyline Sicard 63 y o  male   MRN: [id number]  PCP: Alanna Monday   Unit/Bed#: [facility] 827-01 Encounter: [id number]  Date Of Visit: 20    ASSESSMENT:    Wyline Sicard is a 63 y o  male with:    1  Right lateral foot ulceration - Almanzar 3 - POA  - OM confirmed by MRI, no abscess  2  Right distal posterior leg ulceration - Almanzar 1 - POA  3  Type 2 diabetes with peripheral neuropathy  4  History of left leg BKA  5  History of right foot TMA  6  Obesity      PLAN:    · right 5th ray resection for tomorrow  · Vac running uninterrupted at 125 mmHg  · Continue IV antibiotics per ID  · NWB to RLE  · Appreciate SLIM medication recommendations         SUBJECTIVE:     The patient was seen, evaluated, and assessed at bedside today  The patient was awake, alert, and in no acute distress  No acute events overnight  The patient reports he is in agreement that surgical intervention is the best option  He is aware that he may require a brace due to loss of PB tendon  Patient denies N/V/F/chills/SOB/CP  OBJECTIVE:     Vitals:   /74   Pulse 64   Temp 98 1 °F (36 7 °C)   Resp 15   Ht 5' 4" (1 626 m)   Wt 85 3 kg (188 lb)   SpO2 95%   BMI 32 27 kg/m²     Temp (24hrs), Av 8 °F (36 6 °C), Min:97 4 °F (36 3 °C), Max:98 1 °F (36 7 °C)      Physical Exam :     General:  Alert, cooperative, and in no distress  Lower extremity exam:  Cardiovascular status at baseline  Neurological status at baseline  Musculoskeletal status at baseline  No calf tenderness noted bilaterally       Dressing left intact    Additional Data:     Labs:    Results from last 7 days   Lab Units 09/10/20  0529   WBC Thousand/uL 6 60   HEMOGLOBIN g/dL 13 3   HEMATOCRIT % 38 0   PLATELETS Thousands/uL 204   NEUTROS PCT % 61   LYMPHS PCT % 24   MONOS PCT % 10   EOS PCT % 3     Results from last 7 days   Lab Units 20  0458  20  1850   POTASSIUM mmol/L 4 2   < > 4 3   CHLORIDE mmol/L 108   < > 106   CO2 mmol/L 23   < > 27   BUN mg/dL 26*   < > 24   CREATININE mg/dL 1 00   < > 1 11   CALCIUM mg/dL 8 5   < > 9 6   ALK PHOS U/L  --   --  88   ALT U/L  --   --  25   AST U/L  --   --  18    < > = values in this interval not displayed  * I Have Reviewed All Lab Data Listed Above  Recent Cultures (last 7 days):     Results from last 7 days   Lab Units 09/09/20  1937 09/09/20 1832   BLOOD CULTURE  No Growth at 48 hrs  No Growth at 48 hrs   --    GRAM STAIN RESULT   --  No Polys or Bacteria seen   WOUND CULTURE   --  1+ Growth of Enterobacter cloacae complex*  Few Colonies of Staphylococcus coagulase negative*     Results from last 7 days   Lab Units 09/09/20 1832   ANAEROBIC CULTURE  No anaerobes isolated       Imaging: I have personally reviewed pertinent films in PACS  Pathology, and Other Studies: I have personally reviewed pertinent reports  ** Please Note: Portions of the record may have been created with voice recognition software  Occasional wrong word or "sound a like" substitutions may have occurred due to the inherent limitations of voice recognition software  Read the chart carefully and recognize, using context, where substitutions have occurred   **

## 2020-09-12 NOTE — ASSESSMENT & PLAN NOTE
Right lateral foot ulceration  Right distal posterior leg ulceration   History of left leg BKA  History of right foot TMA  Status post bedside I&D by Podiatry on 9/9  Negative x-ray for osteomyelitis  MRI findings consistent with osteomyelitis of the residual 5th metatarsal   Overlying plantar-lateral soft tissue ulcer  No drainable abscess    On IV antibiotic per ID  Management per primary

## 2020-09-13 ENCOUNTER — APPOINTMENT (INPATIENT)
Dept: RADIOLOGY | Facility: HOSPITAL | Age: 65
DRG: 623 | End: 2020-09-13
Payer: COMMERCIAL

## 2020-09-13 ENCOUNTER — ANESTHESIA EVENT (INPATIENT)
Dept: PERIOP | Facility: HOSPITAL | Age: 65
DRG: 623 | End: 2020-09-13
Payer: COMMERCIAL

## 2020-09-13 ENCOUNTER — ANESTHESIA (INPATIENT)
Dept: PERIOP | Facility: HOSPITAL | Age: 65
DRG: 623 | End: 2020-09-13
Payer: COMMERCIAL

## 2020-09-13 VITALS — HEART RATE: 67 BPM

## 2020-09-13 LAB
GLUCOSE SERPL-MCNC: 106 MG/DL (ref 65–140)
GLUCOSE SERPL-MCNC: 112 MG/DL (ref 65–140)
GLUCOSE SERPL-MCNC: 145 MG/DL (ref 65–140)
GLUCOSE SERPL-MCNC: 158 MG/DL (ref 65–140)
GLUCOSE SERPL-MCNC: 91 MG/DL (ref 65–140)

## 2020-09-13 PROCEDURE — 0QTN0ZZ RESECTION OF RIGHT METATARSAL, OPEN APPROACH: ICD-10-PCS | Performed by: PODIATRIST

## 2020-09-13 PROCEDURE — NC001 PR NO CHARGE: Performed by: PODIATRIST

## 2020-09-13 PROCEDURE — 88307 TISSUE EXAM BY PATHOLOGIST: CPT | Performed by: PATHOLOGY

## 2020-09-13 PROCEDURE — 11043 DBRDMT MUSC&/FSCA 1ST 20/<: CPT | Performed by: PODIATRIST

## 2020-09-13 PROCEDURE — 82948 REAGENT STRIP/BLOOD GLUCOSE: CPT

## 2020-09-13 PROCEDURE — 0L8N3ZZ DIVISION OF RIGHT LOWER LEG TENDON, PERCUTANEOUS APPROACH: ICD-10-PCS | Performed by: PODIATRIST

## 2020-09-13 PROCEDURE — 88311 DECALCIFY TISSUE: CPT | Performed by: PATHOLOGY

## 2020-09-13 PROCEDURE — 28122 PARTIAL REMOVAL OF FOOT BONE: CPT | Performed by: PODIATRIST

## 2020-09-13 PROCEDURE — 99232 SBSQ HOSP IP/OBS MODERATE 35: CPT | Performed by: INTERNAL MEDICINE

## 2020-09-13 PROCEDURE — 73630 X-RAY EXAM OF FOOT: CPT

## 2020-09-13 PROCEDURE — 0JBN0ZZ EXCISION OF RIGHT LOWER LEG SUBCUTANEOUS TISSUE AND FASCIA, OPEN APPROACH: ICD-10-PCS | Performed by: PODIATRIST

## 2020-09-13 PROCEDURE — 27606 INCISION OF ACHILLES TENDON: CPT | Performed by: PODIATRIST

## 2020-09-13 RX ORDER — LIDOCAINE HYDROCHLORIDE 10 MG/ML
INJECTION, SOLUTION EPIDURAL; INFILTRATION; INTRACAUDAL; PERINEURAL AS NEEDED
Status: DISCONTINUED | OUTPATIENT
Start: 2020-09-13 | End: 2020-09-13 | Stop reason: HOSPADM

## 2020-09-13 RX ORDER — SODIUM CHLORIDE, SODIUM LACTATE, POTASSIUM CHLORIDE, CALCIUM CHLORIDE 600; 310; 30; 20 MG/100ML; MG/100ML; MG/100ML; MG/100ML
INJECTION, SOLUTION INTRAVENOUS CONTINUOUS PRN
Status: DISCONTINUED | OUTPATIENT
Start: 2020-09-13 | End: 2020-09-13

## 2020-09-13 RX ORDER — BUPIVACAINE HYDROCHLORIDE 5 MG/ML
INJECTION, SOLUTION PERINEURAL AS NEEDED
Status: DISCONTINUED | OUTPATIENT
Start: 2020-09-13 | End: 2020-09-13 | Stop reason: HOSPADM

## 2020-09-13 RX ORDER — ONDANSETRON 2 MG/ML
4 INJECTION INTRAMUSCULAR; INTRAVENOUS ONCE AS NEEDED
Status: DISCONTINUED | OUTPATIENT
Start: 2020-09-13 | End: 2020-09-13 | Stop reason: HOSPADM

## 2020-09-13 RX ORDER — EPHEDRINE SULFATE 50 MG/ML
INJECTION INTRAVENOUS AS NEEDED
Status: DISCONTINUED | OUTPATIENT
Start: 2020-09-13 | End: 2020-09-13

## 2020-09-13 RX ORDER — FENTANYL CITRATE 50 UG/ML
25 INJECTION, SOLUTION INTRAMUSCULAR; INTRAVENOUS
Status: DISCONTINUED | OUTPATIENT
Start: 2020-09-13 | End: 2020-09-13 | Stop reason: HOSPADM

## 2020-09-13 RX ORDER — LIDOCAINE HYDROCHLORIDE 10 MG/ML
INJECTION, SOLUTION EPIDURAL; INFILTRATION; INTRACAUDAL; PERINEURAL AS NEEDED
Status: DISCONTINUED | OUTPATIENT
Start: 2020-09-13 | End: 2020-09-13

## 2020-09-13 RX ORDER — MIDAZOLAM HYDROCHLORIDE 2 MG/2ML
INJECTION, SOLUTION INTRAMUSCULAR; INTRAVENOUS AS NEEDED
Status: DISCONTINUED | OUTPATIENT
Start: 2020-09-13 | End: 2020-09-13

## 2020-09-13 RX ORDER — PROPOFOL 10 MG/ML
INJECTION, EMULSION INTRAVENOUS CONTINUOUS PRN
Status: DISCONTINUED | OUTPATIENT
Start: 2020-09-13 | End: 2020-09-13

## 2020-09-13 RX ADMIN — METRONIDAZOLE 500 MG: 500 TABLET ORAL at 13:35

## 2020-09-13 RX ADMIN — GABAPENTIN 300 MG: 300 CAPSULE ORAL at 10:20

## 2020-09-13 RX ADMIN — GABAPENTIN 300 MG: 300 CAPSULE ORAL at 16:30

## 2020-09-13 RX ADMIN — CEFEPIME HYDROCHLORIDE 2000 MG: 2 INJECTION, POWDER, FOR SOLUTION INTRAVENOUS at 12:30

## 2020-09-13 RX ADMIN — EPHEDRINE SULFATE 5 MG: 50 INJECTION, SOLUTION INTRAVENOUS at 08:35

## 2020-09-13 RX ADMIN — INSULIN GLARGINE 10 UNITS: 100 INJECTION, SOLUTION SUBCUTANEOUS at 22:45

## 2020-09-13 RX ADMIN — EPHEDRINE SULFATE 5 MG: 50 INJECTION, SOLUTION INTRAVENOUS at 08:17

## 2020-09-13 RX ADMIN — NICOTINE 1 PATCH: 14 PATCH TRANSDERMAL at 10:24

## 2020-09-13 RX ADMIN — METRONIDAZOLE 500 MG: 500 TABLET ORAL at 05:30

## 2020-09-13 RX ADMIN — HEPARIN SODIUM 5000 UNITS: 5000 INJECTION INTRAVENOUS; SUBCUTANEOUS at 13:35

## 2020-09-13 RX ADMIN — HEPARIN SODIUM 5000 UNITS: 5000 INJECTION INTRAVENOUS; SUBCUTANEOUS at 22:45

## 2020-09-13 RX ADMIN — ACETAMINOPHEN 650 MG: 325 TABLET, FILM COATED ORAL at 22:45

## 2020-09-13 RX ADMIN — SODIUM CHLORIDE, SODIUM LACTATE, POTASSIUM CHLORIDE, AND CALCIUM CHLORIDE: .6; .31; .03; .02 INJECTION, SOLUTION INTRAVENOUS at 07:55

## 2020-09-13 RX ADMIN — MIDAZOLAM 2 MG: 1 INJECTION INTRAMUSCULAR; INTRAVENOUS at 07:59

## 2020-09-13 RX ADMIN — LIDOCAINE HYDROCHLORIDE 50 MG: 10 INJECTION, SOLUTION EPIDURAL; INFILTRATION; INTRACAUDAL; PERINEURAL at 08:00

## 2020-09-13 RX ADMIN — PROPOFOL 80 MCG/KG/MIN: 10 INJECTION, EMULSION INTRAVENOUS at 08:00

## 2020-09-13 RX ADMIN — INSULIN LISPRO 1 UNITS: 100 INJECTION, SOLUTION INTRAVENOUS; SUBCUTANEOUS at 16:30

## 2020-09-13 RX ADMIN — METRONIDAZOLE 500 MG: 500 TABLET ORAL at 22:45

## 2020-09-13 RX ADMIN — LISINOPRIL 30 MG: 10 TABLET ORAL at 10:21

## 2020-09-13 RX ADMIN — FENTANYL CITRATE 25 MCG: 50 INJECTION INTRAMUSCULAR; INTRAVENOUS at 09:29

## 2020-09-13 RX ADMIN — GABAPENTIN 300 MG: 300 CAPSULE ORAL at 22:45

## 2020-09-13 RX ADMIN — FAMOTIDINE 20 MG: 20 TABLET ORAL at 10:24

## 2020-09-13 RX ADMIN — CEFEPIME HYDROCHLORIDE 2000 MG: 2 INJECTION, POWDER, FOR SOLUTION INTRAVENOUS at 00:38

## 2020-09-13 NOTE — OP NOTE
OPERATIVE REPORT - Podiatry  PATIENT NAME: Peter Adams    :  1955  MRN: [id number]  Pt Location: BE OR ROOM 07    SURGERY DATE: 2020    Surgeon(s) and Role:     * Yarelis Kendrick DPM - Primary     * Christina Daugherty DPM - Assisting    Pre-op Diagnosis:  Right foot ulcer, with necrosis of bone ([address] ) [L97 513]  Diabetic ulcer of right midfoot associated with type 2 diabetes mellitus, with fat layer exposed ([address] ) [K76 524, L97 412]  Lower limb ulcer, heel or midfoot, right, with fat layer exposed ([address] ) [L97 412]    Post-Op Diagnosis Codes:     * Right foot ulcer, with necrosis of bone ([address] ) [L97 513]     * Diabetic ulcer of right midfoot associated with type 2 diabetes mellitus, with fat layer exposed ([address] ) [H44 665, L97 412]     * Lower limb ulcer, heel or midfoot, right, with fat layer exposed ([address] ) [L97 412]    Procedure(s) (LRB):  AMPUTATION FOOT, removal 5th metatarsal (Right)  TENOTOMY PERCUTANEOUS ACHILLES (Right)  DEBRIDEMENT FOOT/TOE ([address] OUT)        Specimen(s):  ID Type Source Tests Collected by Time Destination   1 : 5th metatarsal Tissue Foot, Right TISSUE EXAM Yarelis Kendrick DPM 2020 0840        Estimated Blood Loss:   Minimal    Drains:  * No LDAs found *    Implants:  * No implants in log *    Anesthesia Type:   IV Sedation with Anesthesia with 10 ml of 1% Lidocaine and 0 5% Bupivacaine in a 1:1 mixture    Hemostasis:  Surgical dissection  Materials:  Nylon suture  Skin staples    Operative Findings:  Fifth metatarsal was gray soft to the touch  Appeared to be necrotic  Peroneal brevis tendon appeared to be viable with no signs of infection  No sinus tracts or purulence are noted intraoperatively  Posterior Achilles wound was primarily closed  Complications:   None    Procedure and Technique:     Under mild sedation, the patient was brought into the operating room and placed on the operating room table in the supine position   A pneumatic tourniquet was then placed around the patient's right lower extremity with ample webril padding  A time out was performed to confirm the correct patient, procedure and site with all parties in agreement  Following IV sedation, a field block was performed consisting of 10 ml of 1% Lidocaine and 0 5% Bupivacaine in a 1:1 mixture  The foot was then scrubbed, prepped and draped in the usual aseptic manner  Care was taken to cover foot wound prior to procedure with Achilles lengthening    Digit was 1st directed to the posterior ankle/Achilles area  Wound was excised using 3:1 excision with sharp dissection performed down to the level of the paratenon  The Achilles not appear to be necrotic or infected  Attention was directed to the achilles tendon  3 stab incisions with cutting of the achilles tendon half way width wise, one medial, and 2 laterally directed, were made measuring about 1cm each about 3cm apart longitudinally on the achilles tendon  It is noted that the achilles adequately elongated and and the ankle was about to dorsiflex sufficiently  The site was then covered with lap and Coban  This isn't directed to the right lateral foot where wound was excised and approximately 7 cm linear incision was created  Sharp and blunt dissection down were performed on the level of the bone cauterizing all bleeders as necessary  The 5th metatarsal was dissected was all soft tissue attachments being removed using a scalpel and Metzenbaum scissors  Peroneal brevis was detached from its 5th metatarsal insertion  Fifth metatarsal was removed in its entirety  The wound debrided using rongeur with wound bed being observed and no signs of necrosis purulence or sinus tracts were noted  The surgical incision was irrigated with copious amounts of normal sterile saline  Skin edges were reapproximated and closure was obtained utilizing interrupted retention sutures utilizingNylon  The foot was then cleansed and dried    The incision site was dressed with Adaptic, 4x4 gauze, and ABD  This was then covered with a Kerlix and posterior splint secured with Ace bandage  The patient tolerated the procedure and anesthesia well and was transported to the PACU with vital signs stable  As with many limb salvage procedures, we contemplate the possibility of performing further stages to this procedure  Procedures may include debridements, delayed closure, plastic surgery techniques, or more proximal amputations  This procedure may be considered part of a multi-staged limb salvage treatment plan  Dr Guero Osorio was present during the entire procedure and participated in all key aspects  SIGNATURE: Shila Novoa DPM  DATE: September 13, 2020  TIME: 9:03 AM      Portions of the record may have been created with voice recognition software  Occasional wrong word or "sound a like" substitutions may have occurred due to the inherent limitations of voice recognition software  Read the chart carefully and recognize, using context, where substitutions have occurred

## 2020-09-13 NOTE — PROGRESS NOTES
[facility] Podiatry - Pre Operative Note  Patient: Mikie Patel 63 y o  male   MRN: [id number]  PCP: Everette Tinsley DO  Unit/Bed#: [facility] Encounter: [id number]  Date Of Visit: 20      Assessment:    Podiatric Assessment:  1  Right lateral foot ulceration - Almanzar 3 - POA  - OM confirmed by MRI, no abscess  2  Right distal posterior leg ulceration - Almanzar 1 - POA  3  Type 2 diabetes with peripheral neuropathy  4  History of left leg BKA  5  History of right foot TMA  6  Obesity      PLAN:    · Patient to go to OR today, 20, for  right 5th met resetction, TREMAYNE vac dalia with Dr Marielena Olmedo  · Consent placed in chart  To be signed with surgeon prior to procedure  · Confirmed NPO status  · H&P, vitals, and current labs reviewed  No acute changes noted  · Alternatives, risks, and complications discussed with patient  · All questions answered  No guarantees given to outcome of procedure  SUBJECTIVE:     Chief Complaint: No chief complaint on file  The patient was seen, evaluated, and assessed at bedside today  The patient was awake, alert, and in no acute distress  Patient confirmed NPO status  All questions and concerns regarding the surgical procedure addressed  Patient understands risks vs benefits of procedure and remains amenable with plan for surgery today  Patient denies N/V/F/chills/SOB/CP  OBJECTIVE:     Vitals:   /75   Pulse 64   Temp 98 2 °F (36 8 °C)   Resp 18   Ht 5' 4" (1 626 m)   Wt 85 3 kg (188 lb)   SpO2 96%   BMI 32 27 kg/m²     Temp (24hrs), Av 8 °F (36 6 °C), Min:97 4 °F (36 3 °C), Max:98 2 °F (36 8 °C)      PHYSICAL EXAM:     General: Alert, cooperative and no distress  Lungs: Non labored breathing  Abdomen: Soft, non-tender  Extremity:     NVS at baseline B/l  MSK function at baseline B/l  No calf tenderness noted B/l  Dressing is left intact to the OR         Additional Data:     Labs:    Results from last 7 days   Lab Units 09/10/20  4192 WBC Thousand/uL 6 60   HEMOGLOBIN g/dL 13 3   HEMATOCRIT % 38 0   PLATELETS Thousands/uL 204   NEUTROS PCT % 61   LYMPHS PCT % 24   MONOS PCT % 10   EOS PCT % 3     Results from last 7 days   Lab Units 09/12/20  0458  09/09/20  1850   POTASSIUM mmol/L 4 2   < > 4 3   CHLORIDE mmol/L 108   < > 106   CO2 mmol/L 23   < > 27   BUN mg/dL 26*   < > 24   CREATININE mg/dL 1 00   < > 1 11   CALCIUM mg/dL 8 5   < > 9 6   ALK PHOS U/L  --   --  88   ALT U/L  --   --  25   AST U/L  --   --  18    < > = values in this interval not displayed  * I Have Reviewed All Lab Data Listed Above  Recent Cultures (last 7 days):     Results from last 7 days   Lab Units 09/09/20  1937 09/09/20  1832   BLOOD CULTURE  No Growth at 72 hrs  No Growth at 72 hrs   --    GRAM STAIN RESULT   --  No Polys or Bacteria seen   WOUND CULTURE   --  1+ Growth of Enterobacter cloacae complex*  Few Colonies of Staphylococcus coagulase negative*       Imaging: I have personally reviewed pertinent films in PACS  EKG, Pathology, and Other Studies: I have personally reviewed pertinent reports  Today, Patient Was Seen By: Christina Daugherty DPM    ** Please Note: Portions of the record may have been created with voice recognition software  Occasional wrong word or "sound a like" substitutions may have occurred due to the inherent limitations of voice recognition software  Read the chart carefully and recognize, using context, where substitutions have occurred   **

## 2020-09-13 NOTE — ANESTHESIA POSTPROCEDURE EVALUATION
Post-Op Assessment Note    CV Status:  Stable  Pain Score: 0    Pain management: adequate     Mental Status:  Alert and awake   Hydration Status:  Euvolemic   PONV Controlled:  Controlled   Airway Patency:  Patent      Post Op Vitals Reviewed: Yes      Staff: CRNA         No complications documented      BP   141/66   Temp   96 0   Pulse  70   Resp   12   SpO2   94%

## 2020-09-13 NOTE — ASSESSMENT & PLAN NOTE
Right lateral foot ulceration, Right distal posterior leg ulceration  Infected diabetic ulcer    History of left leg BKA  History of right foot TMA  Status post bedside I&D by Podiatry on 9/9  Negative x-ray for osteomyelitis  MRI findings consistent with osteomyelitis of the residual 5th metatarsal   Overlying plantar-lateral soft tissue ulcer  No drainable abscess    Status post removal of 5th metatarsal and I&D on 09/13  Management per primary  On IV cefepime and oral Flagyl per ID

## 2020-09-13 NOTE — ASSESSMENT & PLAN NOTE
Lab Results   Component Value Date    HGBA1C 6 4 (H) 07/02/2020       Recent Labs     09/12/20  1558 09/12/20  2105 09/13/20  0719 09/13/20  0908   POCGLU 154* 125 91 112       Blood Sugar Average: Last 72 hrs:  (P) 135 [id number]     Hold metformin while inpatient  Acceptable blood sugar  Continue Lantus q h s    Continue with insulin sliding scale  Monitor

## 2020-09-13 NOTE — PROGRESS NOTES
Progress Note - Nahid Aburto 1955, 63 y o  male MRN: [id number]    Unit/Bed#: [facility] 827-01 Encounter: [id number]    Primary Care Provider: Nickolas Sow DO   Date and time admitted to hospital: 9/9/2020  5:16 PM        Type 2 diabetes mellitus with diabetic polyneuropathy [facility])  Assessment & Plan  Lab Results   Component Value Date    HGBA1C 6 4 (H) 07/02/2020       Recent Labs     09/12/20  1558 09/12/20  2105 09/13/20  0719 09/13/20  0908   POCGLU 154* 125 91 112       Blood Sugar Average: Last 72 hrs:  (P) 135 [id number]     Hold metformin while inpatient  Acceptable blood sugar  Continue Lantus q h s  Continue with insulin sliding scale  Monitor    Essential hypertension  Assessment & Plan  Acceptable BP  Continue lisinopril    * Ulcer of right foot with necrosis of muscle (HCC)  Assessment & Plan  Right lateral foot ulceration, Right distal posterior leg ulceration  Infected diabetic ulcer    History of left leg BKA  History of right foot TMA  Status post bedside I&D by Podiatry on 9/9  Negative x-ray for osteomyelitis  MRI findings consistent with osteomyelitis of the residual 5th metatarsal   Overlying plantar-lateral soft tissue ulcer  No drainable abscess  Status post removal of 5th metatarsal and I&D on 09/13  Management per primary  On IV cefepime and oral Flagyl per ID    GERD (gastroesophageal reflux disease)  Assessment & Plan  Continue Pepcid 20 mg daily    Pipe smoker  Assessment & Plan  Continue nicotine patch      VTE Pharmacologic Prophylaxis:   Pharmacologic: Heparin  Mechanical VTE Prophylaxis in Place: Yes    Patient Centered Rounds: I have performed bedside rounds with nursing staff today  Discussions with Specialists or Other Care Team Provider:     Education and Discussions with Family / Patient:  Patient    Time Spent for Care: 30 minutes  More than 50% of total time spent on counseling and coordination of care as described above      Current Length of Stay: 4 day(s)    Current Patient Status: Inpatient   Certification Statement: The patient will continue to require additional inpatient hospital stay due to Above      Code Status: Level 1 - Full Code      Subjective:   Patient is comfortable in bed  Just came back from the OR  Mild right foot pain  No nausea vomiting  No chest pain or shortness of breath  Objective:     Vitals:   Temp (24hrs), Av 3 °F (36 3 °C), Min:96 °F (35 6 °C), Max:98 2 °F (36 8 °C)    Temp:  [96 °F (35 6 °C)-98 2 °F (36 8 °C)] 97 5 °F (36 4 °C)  HR:  [56-77] 56  Resp:  [18-20] 18  BP: (122-141)/(66-75) 130/67  SpO2:  [96 %-97 %] 96 %  Body mass index is 32 27 kg/m²  Input and Output Summary (last 24 hours): Intake/Output Summary (Last 24 hours) at 2020 1045  Last data filed at 2020 0905  Gross per 24 hour   Intake 600 ml   Output 900 ml   Net -300 ml       Physical Exam:     Physical Exam  Patient is awake alert in no acute distress  Lung clear to auscultation bilateral anteriorly  Heart positive S1-S2 no murmur  Abdomen soft nontender  Left BKA  Right foot/TMA surgical dressing in place    Additional Data:     Labs:    Results from last 7 days   Lab Units 09/10/20  0529   WBC Thousand/uL 6 60   HEMOGLOBIN g/dL 13 3   HEMATOCRIT % 38 0   PLATELETS Thousands/uL 204   NEUTROS PCT % 61   LYMPHS PCT % 24   MONOS PCT % 10   EOS PCT % 3     Results from last 7 days   Lab Units 20  0458  20  1850   POTASSIUM mmol/L 4 2   < > 4 3   CHLORIDE mmol/L 108   < > 106   CO2 mmol/L 23   < > 27   BUN mg/dL 26*   < > 24   CREATININE mg/dL 1 00   < > 1 11   CALCIUM mg/dL 8 5   < > 9 6   ALK PHOS U/L  --   --  88   ALT U/L  --   --  25   AST U/L  --   --  18    < > = values in this interval not displayed  * I Have Reviewed All Lab Data Listed Above  * Additional Pertinent Lab Tests Reviewed:  All Labs For Current Hospital Admission Reviewed    Imaging:    Imaging Reports Reviewed Today Include:   Imaging Personally Reviewed by Myself Includes:      Recent Cultures (last 7 days):     Results from last 7 days   Lab Units 09/09/20  1937 09/09/20  1832   BLOOD CULTURE  No Growth at 72 hrs  No Growth at 72 hrs   --    GRAM STAIN RESULT   --  No Polys or Bacteria seen   WOUND CULTURE   --  1+ Growth of Enterobacter cloacae complex*  Few Colonies of Staphylococcus coagulase negative*       Last 24 Hours Medication List:   Current Facility-Administered Medications   Medication Dose Route Frequency Provider Last Rate    acetaminophen  650 mg Oral Q6H PRN Colan Block, DPM      atorvastatin  20 mg Oral Daily Colan Block, DPM      cefepime  2,000 mg Intravenous Q12H Colan Block, DPM Stopped (09/13/20 0139)    Dakins (full strength)  1 application Irrigation Daily Colan Block, DPM      famotidine  20 mg Oral Daily Colan Block, DPM      gabapentin  300 mg Oral TID Colan Block, DPM      heparin (porcine)  5,000 Units Subcutaneous [facility] Colan Block, DPM      insulin glargine  10 Units Subcutaneous HS Colan Block, DPM      insulin lispro  1-6 Units Subcutaneous TID AC Colan Block, DPM      insulin lispro  1-6 Units Subcutaneous HS Colan Block, DPM      lisinopril  30 mg Oral Daily Colan Block, DPM      metroNIDAZOLE  500 mg Oral [facility] Colan Block, DPM      nicotine  1 patch Transdermal Daily Colan Block, DPM          Today, Patient Was Seen By: Timbo Hines DO    ** Please Note: This note has been constructed using a voice recognition system   **

## 2020-09-13 NOTE — PLAN OF CARE
Problem: Potential for Falls  Goal: Patient will remain free of falls  Description: INTERVENTIONS:  - Assess patient frequently for physical needs  -  Identify cognitive and physical deficits and behaviors that affect risk of falls    -  [place] fall precautions as indicated by assessment   - Educate patient/family on patient safety including physical limitations  - Instruct patient to call for assistance with activity based on assessment  - Modify environment to reduce risk of injury  - Consider OT/PT consult to assist with strengthening/mobility  Outcome: Progressing     Problem: PAIN - ADULT  Goal: Verbalizes/displays adequate comfort level or baseline comfort level  Description: Interventions:  - Encourage patient to monitor pain and request assistance  - Assess pain using appropriate pain scale  - Administer analgesics based on type and severity of pain and evaluate response  - Implement non-pharmacological measures as appropriate and evaluate response  - Consider cultural and social influences on pain and pain management  - Notify physician/advanced practitioner if interventions unsuccessful or patient reports new pain  Outcome: Progressing     Problem: INFECTION - ADULT  Goal: Absence or prevention of progression during hospitalization  Description: INTERVENTIONS:  - Assess and monitor for signs and symptoms of infection  - Monitor lab/diagnostic results  - Monitor all insertion sites, i e  indwelling lines, tubes, and drains  - Monitor endotracheal if appropriate and nasal secretions for changes in amount and color  - [place] appropriate cooling/warming therapies per order  - Administer medications as ordered  - Instruct and encourage patient and family to use good hand hygiene technique  - Identify and instruct in appropriate isolation precautions for identified infection/condition  Outcome: Progressing  Goal: Absence of fever/infection during neutropenic period  Description: INTERVENTIONS:  - Monitor WBC    Outcome: Progressing     Problem: SAFETY ADULT  Goal: Patient will remain free of falls  Description: INTERVENTIONS:  - Assess patient frequently for physical needs  -  Identify cognitive and physical deficits and behaviors that affect risk of falls    -  [place] fall precautions as indicated by assessment   - Educate patient/family on patient safety including physical limitations  - Instruct patient to call for assistance with activity based on assessment  - Modify environment to reduce risk of injury  - Consider OT/PT consult to assist with strengthening/mobility  Outcome: Progressing  Goal: Maintain or return to baseline ADL function  Description: INTERVENTIONS:  -  Assess patient's ability to carry out ADLs; assess patient's baseline for ADL function and identify physical deficits which impact ability to perform ADLs (bathing, care of mouth/teeth, toileting, grooming, dressing, etc )  - Assess/evaluate cause of self-care deficits   - Assess range of motion  - Assess patient's mobility; develop plan if impaired  - Assess patient's need for assistive devices and provide as appropriate  - Encourage maximum independence but intervene and supervise when necessary  - Involve family in performance of ADLs  - Assess for home care needs following discharge   - Consider OT consult to assist with ADL evaluation and planning for discharge  - Provide patient education as appropriate  Outcome: Progressing  Goal: Maintain or return mobility status to optimal level  Description: INTERVENTIONS:  - Assess patient's baseline mobility status (ambulation, transfers, stairs, etc )    - Identify cognitive and physical deficits and behaviors that affect mobility  - Identify mobility aids required to assist with transfers and/or ambulation (gait belt, sit-to-stand, lift, walker, cane, etc )  - [place] fall precautions as indicated by assessment  - Record patient progress and toleration of activity level on Mobility SBAR; progress patient to next Phase/Stage  - Instruct patient to call for assistance with activity based on assessment  - Consider rehabilitation consult to assist with strengthening/weightbearing, etc   Outcome: Progressing     Problem: DISCHARGE PLANNING  Goal: Discharge to home or other facility with appropriate resources  Description: INTERVENTIONS:  - Identify barriers to discharge w/patient and caregiver  - Arrange for needed discharge resources and transportation as appropriate  - Identify discharge learning needs (meds, wound care, etc )  - Arrange for interpretive services to assist at discharge as needed  - Refer to Case Management Department for coordinating discharge planning if the patient needs post-hospital services based on physician/advanced practitioner order or complex needs related to functional status, cognitive ability, or social support system  Outcome: Progressing     Problem: Knowledge Deficit  Goal: Patient/family/caregiver demonstrates understanding of disease process, treatment plan, medications, and discharge instructions  Description: Complete learning assessment and assess knowledge base    Interventions:  - Provide teaching at level of understanding  - Provide teaching via preferred learning methods  Outcome: Progressing     Problem: Prexisting or High Potential for Compromised Skin Integrity  Goal: Skin integrity is maintained or improved  Description: INTERVENTIONS:  - Identify patients at risk for skin breakdown  - Assess and monitor skin integrity  - Assess and monitor nutrition and hydration status  - Monitor labs   - Assess for incontinence   - Turn and reposition patient  - Assist with mobility/ambulation  - Relieve pressure over bony prominences  - Avoid friction and shearing  - Provide appropriate hygiene as needed including keeping skin clean and dry  - Evaluate need for skin moisturizer/barrier cream  - Collaborate with interdisciplinary team   - Patient/family teaching  - Consider wound care consult   Outcome: Progressing

## 2020-09-13 NOTE — ANESTHESIA PREPROCEDURE EVALUATION
Procedure:  AMPUTATION FOOT, removal 5th metatarsal (Right Foot)  TENOTOMY PERCUTANEOUS ACHILLES (Right Ankle)    Relevant Problems   CARDIO   (+) Essential hypertension      ENDO   (+) DM (diabetes mellitus), type 2 with neurological complications (HCC)   (+) Type 2 diabetes mellitus with diabetic polyneuropathy (HCC)      GI/HEPATIC   (+) GERD (gastroesophageal reflux disease)      /RENAL   (+) SUSANA (acute kidney injury) ([facility] [address] )      NEURO/PSYCH   (+) Type 2 diabetes mellitus with diabetic polyneuropathy (HCC)      PULMONARY   (+) Cigar smoker   (+) Pipe smoker        Physical Exam    Airway    Mallampati score: II  TM Distance: >3 FB  Neck ROM: full     Dental   upper dentures and lower dentures,     Cardiovascular  Rhythm: regular, Rate: normal,     Pulmonary  Breath sounds clear to auscultation,     Other Findings        Anesthesia Plan  ASA Score- 3     Anesthesia Type-     Plan Factors-    Induction- intravenous  Postoperative Plan-     Informed Consent- Anesthetic plan and risks discussed with patient  I personally reviewed this patient with the CRNA  Discussed and agreed on the Anesthesia Plan with the CRNA  Gerhardt Sep

## 2020-09-14 LAB
ANION GAP SERPL CALCULATED.3IONS-SCNC: 4 MMOL/L (ref 4–13)
BACTERIA BLD CULT: NORMAL
BACTERIA BLD CULT: NORMAL
BASOPHILS # BLD AUTO: 0.05 THOUSANDS/ΜL (ref 0–0.1)
BASOPHILS NFR BLD AUTO: 1 % (ref 0–1)
BUN SERPL-MCNC: 24 MG/DL (ref 5–25)
CALCIUM SERPL-MCNC: 8.6 MG/DL (ref 8.3–10.1)
CHLORIDE SERPL-SCNC: 110 MMOL/L (ref 100–108)
CO2 SERPL-SCNC: 24 MMOL/L (ref 21–32)
CREAT SERPL-MCNC: 0.94 MG/DL (ref 0.6–1.3)
EOSINOPHIL # BLD AUTO: 0.17 THOUSAND/ΜL (ref 0–0.61)
EOSINOPHIL NFR BLD AUTO: 2 % (ref 0–6)
ERYTHROCYTE [DISTWIDTH] IN BLOOD BY AUTOMATED COUNT: 12.8 % (ref 11.6–15.1)
GFR SERPL CREATININE-BSD FRML MDRD: 86 ML/MIN/1.73SQ M
GLUCOSE SERPL-MCNC: 103 MG/DL (ref 65–140)
GLUCOSE SERPL-MCNC: 120 MG/DL (ref 65–140)
GLUCOSE SERPL-MCNC: 140 MG/DL (ref 65–140)
GLUCOSE SERPL-MCNC: 163 MG/DL (ref 65–140)
GLUCOSE SERPL-MCNC: 185 MG/DL (ref 65–140)
HCT VFR BLD AUTO: 39.8 % (ref 36.5–49.3)
HGB BLD-MCNC: 13.4 G/DL (ref 12–17)
IMM GRANULOCYTES # BLD AUTO: 0.1 THOUSAND/UL (ref 0–0.2)
IMM GRANULOCYTES NFR BLD AUTO: 1 % (ref 0–2)
LYMPHOCYTES # BLD AUTO: 1.18 THOUSANDS/ΜL (ref 0.6–4.47)
LYMPHOCYTES NFR BLD AUTO: 15 % (ref 14–44)
MAGNESIUM SERPL-MCNC: 2.5 MG/DL (ref 1.6–2.6)
MCH RBC QN AUTO: 33.1 PG (ref 26.8–34.3)
MCHC RBC AUTO-ENTMCNC: 33.7 G/DL (ref 31.4–37.4)
MCV RBC AUTO: 98 FL (ref 82–98)
MONOCYTES # BLD AUTO: 0.93 THOUSAND/ΜL (ref 0.17–1.22)
MONOCYTES NFR BLD AUTO: 12 % (ref 4–12)
NEUTROPHILS # BLD AUTO: 5.27 THOUSANDS/ΜL (ref 1.85–7.62)
NEUTS SEG NFR BLD AUTO: 69 % (ref 43–75)
NRBC BLD AUTO-RTO: 0 /100 WBCS
PLATELET # BLD AUTO: 192 THOUSANDS/UL (ref 149–390)
PMV BLD AUTO: 9.3 FL (ref 8.9–12.7)
POTASSIUM SERPL-SCNC: 4.3 MMOL/L (ref 3.5–5.3)
RBC # BLD AUTO: 4.05 MILLION/UL (ref 3.88–5.62)
SODIUM SERPL-SCNC: 138 MMOL/L (ref 136–145)
WBC # BLD AUTO: 7.7 THOUSAND/UL (ref 4.31–10.16)

## 2020-09-14 PROCEDURE — 80048 BASIC METABOLIC PNL TOTAL CA: CPT | Performed by: INTERNAL MEDICINE

## 2020-09-14 PROCEDURE — 2W1SX6Z COMPRESSION OF RIGHT FOOT USING PRESSURE DRESSING: ICD-10-PCS | Performed by: PODIATRIST

## 2020-09-14 PROCEDURE — 82948 REAGENT STRIP/BLOOD GLUCOSE: CPT

## 2020-09-14 PROCEDURE — 97164 PT RE-EVAL EST PLAN CARE: CPT

## 2020-09-14 PROCEDURE — 83735 ASSAY OF MAGNESIUM: CPT | Performed by: INTERNAL MEDICINE

## 2020-09-14 PROCEDURE — 97168 OT RE-EVAL EST PLAN CARE: CPT

## 2020-09-14 PROCEDURE — 85025 COMPLETE CBC W/AUTO DIFF WBC: CPT | Performed by: INTERNAL MEDICINE

## 2020-09-14 PROCEDURE — 99024 POSTOP FOLLOW-UP VISIT: CPT | Performed by: PODIATRIST

## 2020-09-14 PROCEDURE — 99232 SBSQ HOSP IP/OBS MODERATE 35: CPT | Performed by: INTERNAL MEDICINE

## 2020-09-14 PROCEDURE — 97530 THERAPEUTIC ACTIVITIES: CPT

## 2020-09-14 RX ORDER — DOCUSATE SODIUM 100 MG/1
100 CAPSULE, LIQUID FILLED ORAL 2 TIMES DAILY PRN
Status: DISCONTINUED | OUTPATIENT
Start: 2020-09-14 | End: 2020-09-15 | Stop reason: HOSPADM

## 2020-09-14 RX ADMIN — FAMOTIDINE 20 MG: 20 TABLET ORAL at 08:16

## 2020-09-14 RX ADMIN — ATORVASTATIN CALCIUM 20 MG: 20 TABLET, FILM COATED ORAL at 08:16

## 2020-09-14 RX ADMIN — LISINOPRIL 30 MG: 10 TABLET ORAL at 08:16

## 2020-09-14 RX ADMIN — INSULIN GLARGINE 10 UNITS: 100 INJECTION, SOLUTION SUBCUTANEOUS at 21:52

## 2020-09-14 RX ADMIN — CEFEPIME HYDROCHLORIDE 2000 MG: 2 INJECTION, POWDER, FOR SOLUTION INTRAVENOUS at 00:43

## 2020-09-14 RX ADMIN — METRONIDAZOLE 500 MG: 500 TABLET ORAL at 05:43

## 2020-09-14 RX ADMIN — INSULIN LISPRO 1 UNITS: 100 INJECTION, SOLUTION INTRAVENOUS; SUBCUTANEOUS at 11:47

## 2020-09-14 RX ADMIN — NICOTINE 1 PATCH: 14 PATCH TRANSDERMAL at 08:17

## 2020-09-14 RX ADMIN — INSULIN LISPRO 1 UNITS: 100 INJECTION, SOLUTION INTRAVENOUS; SUBCUTANEOUS at 17:15

## 2020-09-14 RX ADMIN — METRONIDAZOLE 500 MG: 500 TABLET ORAL at 21:52

## 2020-09-14 RX ADMIN — METRONIDAZOLE 500 MG: 500 TABLET ORAL at 13:44

## 2020-09-14 RX ADMIN — CEFEPIME HYDROCHLORIDE 2000 MG: 2 INJECTION, POWDER, FOR SOLUTION INTRAVENOUS at 13:44

## 2020-09-14 RX ADMIN — HEPARIN SODIUM 5000 UNITS: 5000 INJECTION INTRAVENOUS; SUBCUTANEOUS at 05:43

## 2020-09-14 RX ADMIN — GABAPENTIN 300 MG: 300 CAPSULE ORAL at 17:14

## 2020-09-14 RX ADMIN — HEPARIN SODIUM 5000 UNITS: 5000 INJECTION INTRAVENOUS; SUBCUTANEOUS at 21:52

## 2020-09-14 RX ADMIN — HEPARIN SODIUM 5000 UNITS: 5000 INJECTION INTRAVENOUS; SUBCUTANEOUS at 13:44

## 2020-09-14 RX ADMIN — GABAPENTIN 300 MG: 300 CAPSULE ORAL at 21:52

## 2020-09-14 RX ADMIN — GABAPENTIN 300 MG: 300 CAPSULE ORAL at 08:16

## 2020-09-14 NOTE — PROGRESS NOTES
Progress Note - Infectious Disease   Salome Szymanski 63 y o  male MRN: [id number]  Unit/Bed#: [facility] 827-01 Encounter: [id number]      Impression/Plan:    60-year-old male patient with diabetes mellitus type 2, peripheral neuropathy, history of left BKA and right foot TMA, admitted for management of right foot ulceration     1- infected right diabetic foot ulcer:  Patient with 2 ulcerations in place, one over the Achillis tendon and one over lateral plantar aspect of right foot, at site of TMA  No fever, patient remains hemodynamically stable  Wound culture growing Enterobacter and coagulase-negative Staph; blood culture negative  MRI shows evidence of possible osteomyelitis of the residual 5th metatarsal  Patient is postop day 1 today for debridement and total removal of 5th metatarsal;  Operative report reviewed, case discussed with podiatrist   Surgical cure achieved  -   as surgical cure has been achieved, okay for discharge from ID standpoint  Transition patient to doxycycline 100 mg p o  Q 12 hours and Flagyl 500 mg p o  Q 8 hours to finish 7 days of postoperative course through 09/20/2020  Alecia Tim outpatient follow-up with Podiatry  - continue local wound care     2- diabetes mellitus type 2 and peripheral neuropathy:  Risk factor for poor wound healing and infectious complication diabetic ulcers  - management as per Internal Medicine team     3- hypertension     4- smoker:  Risk factor for poor wound healing  - encouraged  smoking cessation     5- history of left BKA     Plan mentioned above discussed with patient and with primary service provider  Case discussed with podiatrist    Antibiotics:  Postop day 1  Antibiotics day 6  Cefepime day 3  Flagyl day 6    Subjective:  Patient has no fever, chills, sweats; no nausea, vomiting, diarrhea; no cough, shortness of breath; no pain  No new symptoms      Objective:  Vitals:  Temp:  [97 5 °F (36 4 °C)-99 1 °F (37 3 °C)] 97 5 °F (36 4 °C)  HR:  lAbin Perales 57  Resp:  [19] 19  BP: (132-152)/(63-71) 145/71  SpO2:  [96 %-99 %] 97 %  Temp (24hrs), Av °F (36 7 °C), Min:97 5 °F (36 4 °C), Max:99 1 °F (37 3 °C)  Current: Temperature: 97 5 °F (36 4 °C)    Physical Exam:   General Appearance:  Alert, interactive, nontoxic, no acute distress  Throat: Oropharynx moist without lesions  Lungs:   Clear to auscultation bilaterally; no wheezes, rhonchi or rales; respirations unlabored   Heart:  RRR; no murmur, rub or gallop   Abdomen:   Soft, non-tender, non-distended, positive bowel sounds  Extremities: No clubbing, cyanosis or edema  Status post left BKA  Dressing over right foot is dry and clean, no breakthrough bleeding or drainage   Skin: No new rashes or lesions  Labs, Imaging, & Other studies:   All pertinent labs and imaging studies were personally reviewed  Results from last 7 days   Lab Units 20  0529 09/10/20  0529 20  1850   WBC Thousand/uL 7 70 6 60 8 58   HEMOGLOBIN g/dL 13 4 13 3 14 5   PLATELETS Thousands/uL 192 204 227     Results from last 7 days   Lab Units 20  0529 20  0458 20  0536  20  1850   SODIUM mmol/L 138 137 138   < > 139   POTASSIUM mmol/L 4 3 4 2 3 9   < > 4 3   CHLORIDE mmol/L 110* 108 106   < > 106   CO2 mmol/L 24 23 23   < > 27   BUN mg/dL 24 26* 23   < > 24   CREATININE mg/dL 0 94 1 00 1 14   < > 1 11   EGFR ml/min/1 73sq m 86 80 68   < > 70   CALCIUM mg/dL 8 6 8 5 8 5   < > 9 6   AST U/L  --   --   --   --  18   ALT U/L  --   --   --   --  25   ALK PHOS U/L  --   --   --   --  88    < > = values in this interval not displayed  Results from last 7 days   Lab Units 20  1937 20  1832   BLOOD CULTURE  No Growth After 4 Days  No Growth After 4 Days    --    GRAM STAIN RESULT   --  No Polys or Bacteria seen   WOUND CULTURE   --  1+ Growth of Enterobacter cloacae complex*  Few Colonies of Staphylococcus coagulase negative*         Results from last 7 days   Lab Units 20  8220 CRP mg/L 14 8*

## 2020-09-14 NOTE — PROGRESS NOTES
Progress Note - Ana Peterson 1955, 63 y o  male MRN: [id number]    Unit/Bed#: [facility] 827-01 Encounter: [id number]    Primary Care Provider: Walter Powers DO   Date and time admitted to hospital: 9/9/2020  5:16 PM        Type 2 diabetes mellitus with diabetic polyneuropathy [facility])  Assessment & Plan  Lab Results   Component Value Date    HGBA1C 6 4 (H) 07/02/2020       Recent Labs     09/13/20  1049 09/13/20  1606 09/13/20  2107 09/14/20  0744   POCGLU 106 158* 145* 103       Blood Sugar Average: Last 72 hrs:  (P) 129 [id number]     Hold metformin while inpatient  Acceptable blood sugar  Continue Lantus 10 units q h s  Continue with insulin sliding scale  Monitor    Essential hypertension  Assessment & Plan  Acceptable BP  Continue lisinopril    * Ulcer of right foot with necrosis of muscle (HCC)  Assessment & Plan  Right lateral foot ulceration, Right distal posterior leg ulceration  Infected diabetic ulcer    History of left leg BKA  History of right foot TMA  Status post bedside I&D by Podiatry on 9/9  Negative x-ray for osteomyelitis  MRI findings consistent with osteomyelitis of the residual 5th metatarsal   Overlying plantar-lateral soft tissue ulcer  No drainable abscess  Status post removal of 5th metatarsal and I&D on 09/13  Management per primary  On IV cefepime and oral Flagyl per ID    GERD (gastroesophageal reflux disease)  Assessment & Plan  Continue Pepcid 20 mg daily    Pipe smoker  Assessment & Plan  Continue nicotine patch      VTE Pharmacologic Prophylaxis:   Pharmacologic: Heparin  Mechanical VTE Prophylaxis in Place: Yes    Patient Centered Rounds: I have performed bedside rounds with nursing staff today  Discussions with Specialists or Other Care Team Provider:     Education and Discussions with Family / Patient:  Patient    Time Spent for Care: 30 minutes  More than 50% of total time spent on counseling and coordination of care as described above      Current Length of Stay: 5 day(s)    Current Patient Status: Inpatient   Certification Statement: The patient will continue to require additional inpatient hospital stay due to Above      Code Status: Level 1 - Full Code      Subjective:   Patient seen and  examined  Comfortable sitting in bed  No chest pain or shortness of breath  No nausea vomiting or diarrhea    Objective:     Vitals:   Temp (24hrs), Av 9 °F (36 6 °C), Min:97 4 °F (36 3 °C), Max:99 1 °F (37 3 °C)    Temp:  [97 4 °F (36 3 °C)-99 1 °F (37 3 °C)] 97 5 °F (36 4 °C)  HR:  [57-75] 57  Resp:  [18-19] 19  BP: (132-152)/(63-71) 145/71  SpO2:  [96 %-100 %] 97 %  Body mass index is 32 27 kg/m²  Input and Output Summary (last 24 hours): Intake/Output Summary (Last 24 hours) at 2020 1036  Last data filed at 2020 0826  Gross per 24 hour   Intake 360 ml   Output 1100 ml   Net -740 ml       Physical Exam:     Physical Exam  Patient is awake alert in no acute distress  Lung clear to auscultation bilateral anteriorly  Heart positive S1-S2 no murmur  Abdomen soft nontender  Left BKA  Right foot/TMA surgical dressing in place    Additional Data:     Labs:    Results from last 7 days   Lab Units 20  0529   WBC Thousand/uL 7 70   HEMOGLOBIN g/dL 13 4   HEMATOCRIT % 39 8   PLATELETS Thousands/uL 192   NEUTROS PCT % 69   LYMPHS PCT % 15   MONOS PCT % 12   EOS PCT % 2     Results from last 7 days   Lab Units 20  0529  20  1850   POTASSIUM mmol/L 4 3   < > 4 3   CHLORIDE mmol/L 110*   < > 106   CO2 mmol/L 24   < > 27   BUN mg/dL 24   < > 24   CREATININE mg/dL 0 94   < > 1 11   CALCIUM mg/dL 8 6   < > 9 6   ALK PHOS U/L  --   --  88   ALT U/L  --   --  25   AST U/L  --   --  18    < > = values in this interval not displayed  * I Have Reviewed All Lab Data Listed Above  * Additional Pertinent Lab Tests Reviewed:  All Labs For Current Hospital Admission Reviewed    Imaging:    Imaging Reports Reviewed Today Include:   Imaging Personally Reviewed by Myself Includes:     Recent Cultures (last 7 days):     Results from last 7 days   Lab Units 09/09/20  1937 09/09/20  1832   BLOOD CULTURE  No Growth After 4 Days  No Growth After 4 Days  --    GRAM STAIN RESULT   --  No Polys or Bacteria seen   WOUND CULTURE   --  1+ Growth of Enterobacter cloacae complex*  Few Colonies of Staphylococcus coagulase negative*       Last 24 Hours Medication List:   Current Facility-Administered Medications   Medication Dose Route Frequency Provider Last Rate    acetaminophen  650 mg Oral Q6H PRN Isaura Milder, DPM      atorvastatin  20 mg Oral Daily Isaura Milder, DPM      cefepime  2,000 mg Intravenous Q12H Isaura Milder, DPM Stopped (09/14/20 0138)    docusate sodium  100 mg Oral BID PRN Chichi Echeverria, DPM      famotidine  20 mg Oral Daily Isaura Milder, DPM      gabapentin  300 mg Oral TID Isaura Milder, DPM      heparin (porcine)  5,000 Units Subcutaneous [facility] Isaura Milder, DPM      insulin glargine  10 Units Subcutaneous HS Isaura Milder, DPM      insulin lispro  1-6 Units Subcutaneous TID AC Isaura Milder, DPM      insulin lispro  1-6 Units Subcutaneous HS Isaura Milder, DPM      lisinopril  30 mg Oral Daily Isaura Milder, DPM      metroNIDAZOLE  500 mg Oral [facility] Isaura Milder, DPM      nicotine  1 patch Transdermal Daily Isaura Milder, DPM          Today, Patient Was Seen By: Milla Lemon DO    ** Please Note: This note has been constructed using a voice recognition system   **

## 2020-09-14 NOTE — PROGRESS NOTES
Progress Note - Podiatry  Nhung Mullins 63 y o  male MRN: [id number]  Unit/Bed#: [facility] 827-01 Encounter: [id number]    Assessment:    1  Right lateral foot ulceration w/ OM of residual 5th metatarsal confirmed by MRI  - status post right 5th metatarsal resection and tendo-Achilles lengthening (DOS 09/13/2020)  2  Right distal posterior leg ulceration - Almanzar 1 - POA  3  Type 2 diabetes with peripheral neuropathy (A1c 6 4% 07/02/2020)  4  History of left leg BKA  5  History of right foot TMA  6  Obesity    Plan:  - POD#1 s/p right 5th metatarsal resection and TREMAYNE:  Discussed case with resident in case, feels surgical cure achieved  Dressing/posterior splint left clean, dry and intact to right lower extremity  Pain well controlled postoperatively   - PT/OT consulted for postoperative nonweightbearing to right lower extremity  - appreciate antibiotics per Infectious Disease  - appreciate medical management per Internal Medicine  - Colace added for assistance with BM PRN     Weight bearing status:  Nonweightbearing right lower extremity  VTE prophylaxis:  Heparin subq  Antibiotics:  IV cefepime, PO Flagyl  Cultures 09/09/2020: Wound -  Enterobacter cloaca and Staph coagulase negative, Blood - NG4D    Dispo:  Patient require further inpatient stay due to above    Subjective/Objective   Chief Complaint: No chief complaint on file  Subjective: 63 y o  y/o male was seen and evaluated at bedside  Feels well today  Notes that he has not had a bowel movement since surgery yesterday  Has been urinating without issue  Patient denies nausea, vomiting, chest pain, shortness of breath, chills, fever  Blood pressure 145/71, pulse 57, temperature 97 5 °F (36 4 °C), resp  rate 19, height 5' 4" (1 626 m), weight 85 3 kg (188 lb), SpO2 97 %  ,Body mass index is 32 27 kg/m²      Invasive Devices     Peripheral Intravenous Line            Peripheral IV 09/10/20 Left Wrist 3 days                Physical Exam:   General: Alert, cooperative and no distress  Lungs: Non labored breathing  Heart: No chest pain  Abdomen: Soft, non-tender  Hx L BKA    Extremity:  Dressing to right foot clean dry and intact with posterior splint  No drainage or strike through noted  Pain minimal           Lab, Imaging and other studies:   CBC:   Lab Results   Component Value Date    WBC 7 70 09/14/2020    HGB 13 4 09/14/2020    HCT 39 8 09/14/2020    MCV 98 09/14/2020     09/14/2020    MCH 33 1 09/14/2020    MCHC 33 7 09/14/2020    RDW 12 8 09/14/2020    MPV 9 3 09/14/2020    NRBC 0 09/14/2020   , CMP:   Lab Results   Component Value Date    SODIUM 138 09/14/2020    K 4 3 09/14/2020     (H) 09/14/2020    CO2 24 09/14/2020    BUN 24 09/14/2020    CREATININE 0 94 09/14/2020    CALCIUM 8 6 09/14/2020    EGFR 86 09/14/2020       Imaging: I have personally reviewed pertinent films in PACS  EKG, Pathology, and Other Studies: I have personally reviewed pertinent reports

## 2020-09-14 NOTE — ASSESSMENT & PLAN NOTE
Lab Results   Component Value Date    HGBA1C 6 4 (H) 07/02/2020       Recent Labs     09/13/20  1049 09/13/20  1606 09/13/20  2107 09/14/20  0744   POCGLU 106 158* 145* 103       Blood Sugar Average: Last 72 hrs:  (P) 550 [id number]     Hold metformin while inpatient  Acceptable blood sugar  Continue Lantus 10 units q h s    Continue with insulin sliding scale  Monitor

## 2020-09-14 NOTE — OCCUPATIONAL THERAPY NOTE
Occupational Therapy Re-Evaluation     Patient Name: Wyline Sicard  DCKNI'X Date: 9/14/2020  Problem List  Principal Problem:    Ulcer of right foot with necrosis of muscle ([facility] [address] )  Active Problems:    BMI 32 0-32 9,adult    Cigar smoker    Pipe smoker    DM (diabetes mellitus), type 2 with neurological complications ([facility] )    GERD (gastroesophageal reflux disease)    Essential hypertension    Venous ulcer of right lower extremity without varicose veins (HCC)    Lower limb ulcer, heel or midfoot, right, with fat layer exposed ([facility] )    Diabetic ulcer of right midfoot associated with type 2 diabetes mellitus, with fat layer exposed ([facility] )    Type 2 diabetes mellitus with diabetic polyneuropathy ([facility] )    Past Medical History  Past Medical History:   Diagnosis Date    [facility]     Hypertension     Neuropathy in diabetes [facility])      Past Surgical History  Past Surgical History:   Procedure Laterality Date    AMPUTATION      FOOT AMPUTATION Right 9/13/2020    Procedure: AMPUTATION FOOT, removal 5th metatarsal;  Surgeon: Crystal Byrne DPM;  [facility];  Service: Podiatry    FOOT SURGERY      [place] INCIS ACHILLES TENDON+LOCAL ANESTH Right 9/13/2020    Procedure: TENOTOMY PERCUTANEOUS ACHILLES;  Surgeon: Crystal Byrne DPM;  Location: [facility];  Service: Podiatry    WOUND DEBRIDEMENT  9/13/2020    Procedure: DEBRIDEMENT FOOT/TOE [facility]);   Surgeon: Crystal Byrne DPM;  [facility];  Service: Podiatry      09/14/20 1020   Note Type   Note type Re-eval   Restrictions/Precautions   Weight Bearing Precautions Per Order Yes   RLE Weight Bearing Per Order NWB   Braces or Orthoses   (L prostethis)   Pain Assessment   Pain Assessment Tool 0-10   Pain Score 2   Pain Location/Orientation Orientation: Right;Location: Leg   Home Living   Type of [address]   (2nd floor apartment with [address])   Bathroom Shower/Tub Tub/shower unit   [employer] [address]; Wheelchair-manual   Prior Function   Level of [place] Independent with ADLs and functional mobility   Lives With Significant other   Receives Help From Friend(s)   ADL Assistance Independent   IADLs Independent   Falls in the last 6 months 0   Vocational Full time employment   Lifestyle   Autonomy I with ADL's/IADL's, +left prosthesis -functional ambulation without AD, +drives, works full time   Reciprocal Relationships significant other   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   Intrinsic Gratification [place]   Psychosocial   Psychosocial (WDL) WDL   ADL   Eating Assistance 7  Independent   Grooming Assistance 7  Independent   UB Bathing Assistance 5  Supervision/Setup   LB Bathing Assistance 5  Supervision/Setup   UB Dressing Assistance 5  Supervision/Setup   LB Dressing Assistance 5  Supervision/Setup   Toileting Assistance  5  Supervision/Setup   Bed Mobility   Supine to Sit 6  Modified independent   Additional items Assist x 1   Sit to Supine 6  Modified independent   Additional items Assist x 1   Transfers   Sit to Stand 5  Supervision   Additional items Assist x 1   Stand to Sit 5  Supervision   Additional items Assist x 1   Stand pivot 5  Supervision   Additional items Assist x 1  (bed>chair with RW and NWB to RLE occassional verbal cues to maintain, re-educated on sit to stand while maintaining NWB method to increase and I of transfer )   Balance   Static Sitting Normal   Dynamic Sitting Good   Static Standing Fair +   Dynamic Standing Fair   Ambulatory Fair -   Activity Tolerance   Activity Tolerance Patient limited by fatigue   Nurse Made Aware RN cleared for therapy   RUE Assessment   RUE Assessment WFL   LUE Assessment   LUE Assessment WFL   Hand Function   Gross Motor Coordination Functional   Fine Motor Coordination Functional   Vision-Basic Assessment   Current Vision Wears glasses all the time   Vision - Complex Assessment   Acuity Able to read normal print without difficulty   Cognition   Overall Cognitive Status [facility]   Arousal/Participation Alert; Cooperative   Attention Within functional limits   Orientation Level Oriented X4   Memory Within functional limits   Following Commands Follows all commands and directions without difficulty   Comments pleasant and cooperative, occassional cue for NWB to RLE post education good carryover  Educated pt on home safety and energy conservation techniques, pt receptive  Assessment   Limitation Decreased ADL status; Decreased endurance;Decreased high-level ADLs; Decreased self-care trans   Prognosis Good   Assessment Pt seen for a re-evaluation 2* to new OT consult and s/p surgery (see below) pt is a 63 y o  male who was admitted to [facility] on 9/9/2020 with Ulcer of right foot with necrosis of muscle ([address] ) s/p right foot removal of 5th metatarsal, excision of foot ulcer and posterior ankle ulcer right foot, right percutaneous tendo achilles lengthening on 9/13/20 +RLE NWB, LLE prosthesis  Pt's problem list also includes PMH of    At baseline pt was completing I with ALD's/IADL's, +L prosthesis, +RW  Pt lives with significant other in a 2 SH with full flight to enter  Currently pt requires Set-up for overall ADLS and S for functional mobility/transfers  Pt currently presents with impairments in the following categories -steps to enter environment, difficulty performing ADLS and difficulty performing IADLS  activity tolerance, endurance and standing balance/tolerance  These impairments, as well as pt's fatigue  limit pt's ability to safely engage in all baseline areas of occupation, includingbathing, dressing, toileting, functional mobility/transfers, community mobility, laundry , house maintenance, medication management and meal prep From OT standpoint, recommend home with family support no DME needs upon D/C   No further acute OT needs indicated at this time - Recommend continued oob for meals, ambulation to/from BR, setup for self care tasks and mobility in hallway with nursing/restorative - d/c from caseload with above recommendations    Goals   Patient Goals go home   Recommendation   OT Discharge Recommendation Return to previous environment with social support   OT - OK to Discharge Yes   Barthel Index   Feeding 10   Bathing 0   Grooming Score 5   Dressing Score 10   Bladder Score 10   Bowels Score 10   Toilet Use Score 5   Transfers (Bed/Chair) Score 10   Mobility (Level Surface) Score 0   Stairs Score 0   Barthel Index Score 60   Modified Kenyon Scale   Modified Kenyon Scale 3      Faye Eugene MOT, OTR/L

## 2020-09-14 NOTE — PHYSICAL THERAPY NOTE
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 ([facility] )    History of amputation of right foot through metatarsal bone ([facility] )    Essential hypertension    Venous ulcer of right lower extremity without varicose veins (HCC)    Ulcer of right foot with necrosis of muscle ([facility] )    Lower limb ulcer, heel or midfoot, right, with fat layer exposed ([facility] )    SUSANA (acute kidney injury) ([facility] )    Albuminuria    Cellulitis    Cellulitis of foot, right    Diabetic ulcer of right midfoot associated with type 2 diabetes mellitus, with fat layer exposed ([facility] )    Dyslipidemia    Type 2 diabetes mellitus with diabetic polyneuropathy ([facility] )    Venous insufficiency       Past Medical History  Past Medical History:   Diagnosis Date    Diabetes mellitus ([place] [address] )     Hypertension     Neuropathy in diabetes [facility])        Past Surgical History  Past Surgical History:   Procedure Laterality Date    AMPUTATION      FOOT AMPUTATION Right 9/13/2020    Procedure: AMPUTATION FOOT, removal 5th metatarsal;  Surgeon: Tonja Perdomo DPM;  [facility];  Service: Podiatry    FOOT SURGERY      [place] INCIS ACHILLES TENDON+LOCAL ANESTH Right 9/13/2020    Procedure: TENOTOMY PERCUTANEOUS ACHILLES;  Surgeon: Tonja Perdomo DPM;  [facility];  Service: Podiatry    WOUND DEBRIDEMENT  9/13/2020    Procedure: DEBRIDEMENT FOOT/TOE [facility]);   Surgeon: Tonja Perdomo DPM;  [facility];  Service: Podiatry        09/14/20 1630   PT Last Visit   PT Visit Date 09/14/20   Note Type   Note type Re-eval   Pain Assessment   Pain Assessment Tool 0-10   Pain Score 2   Pain Location/Orientation Orientation: Right;Location: Leg   Hospital Pain Intervention(s) Repositioned; Ambulation/increased activity; Elevated   Home Living   Type of Home Apartment  (2nd floor)   Home Layout Stairs to enter with rails  ([address])   [address]; Wheelchair-manual   Prior Function   Level of [place] Independent with ADLs and functional mobility   Lives With Significant other   ADL Assistance Independent   IADLs Independent   Falls in the last 6 months 0   Vocational Full time employment   Comments Independent without AD with L LE prosthesis, denies any recent falls, active   Restrictions/Precautions   Weight Bearing Precautions Per Order Yes   RLE Weight Bearing Per Order NWB   Braces or Orthoses Prosthesis; Splint  (L LE BKA prosthesis, R ankle splinted)   Other Precautions WBS; Fall Risk;Pain   General   Additional Pertinent History s/p 5th met amputation and washout on 9/13   Family/Caregiver Present No   Cognition   Overall Cognitive Status WFL   Arousal/Participation Alert   Attention Within functional limits   Orientation Level Oriented X4   Memory Within functional limits   Following Commands Follows all commands and directions without difficulty   Comments Pleasant and motivated   RLE Assessment   RLE Assessment WFL  (4/5, ankle not tested, splinted)   LLE Assessment   LLE Assessment WNL  (5/5)   Light Touch   RLE Light Touch Impaired   RLE Light Touch Comments impaired distally at foot, intact at knee   LLE Light Touch Grossly intact   Bed Mobility   Supine to Sit 6  Modified independent   Sit to Supine 6  Modified independent   Transfers   Sit to Stand 5  Supervision   Stand to Sit 5  Supervision   Additional Comments with RW   Ambulation/Elevation   Gait pattern Improper Weight shift;Decreased foot clearance; Excessively slow; Short stride   Gait Assistance 5  Supervision   Additional items Assist x 1   Assistive Device Rolling walker   Distance 5 ft, able to maintain NWB appropriately   Stair Management Assistance Not tested  (pt refused, states "I've done it before, I don't need to " )   Balance   Static Sitting Normal   Dynamic Sitting Good   Static Standing Fair +   Dynamic Standing Fair   Ambulatory Fair -   Activity Tolerance   Activity Tolerance Patient limited by fatigue   Nurse Made Aware RN updated  Assessment   Prognosis Good   Problem List Decreased strength;Decreased endurance; Impaired balance;Decreased mobility;Pain;Orthopedic restrictions   Assessment Pt seen for PT re-evaluation s/p R 5th met amputation and washout on 9/13  Pt remains NWB R LE  Pt currently independent with bed mobility, able to don L LE prosthesis independently, requires supervision for transfers and ambulation short distances, increased difficulty secondary to difficulty hopping on prosthetic limb  Pt educated on knee scooter use as noted below  Pt refused stair training, states he performed stair bump following TMA R LE, NWB for 1 month  Pt will benefit from continued skilled PT intervention during course of hospital stay to improve strength, endurance and balance to improve safety with functional mobility  Recommend home with family care upon hospital D/C  Barriers to Discharge Inaccessible home environment   Goals   Patient Goals go home   STG Expiration Date 09/24/20   Short Term Goal #1 In 10 days pt will be able to: 1  Demonstrate ability to perform all aspects of bed mobility independently to increase functional independence  2  Perform functional transfers with RW independently to facilitate safe return to previous living environment  3   Ambulate 15 ft with RW and supervision maintaining NWB with stable vitals to improve safety with household distances and reduce fall risk  4  Bump up/down 20 steps with supervision and 1 HR to simulate entrance to home  5  Improve LE strength grades by 1 to increase ease of functional mobility with transfers and gait   6  Pt will demonstrate improved balance by one grade order to decrease risk of falls  7  Perform locomotion 300 ft with knee scooter independently navigating 2 obstacles and performing 2 rotational turns  PT Treatment Day 1   Plan   Treatment/Interventions Functional transfer training;LE strengthening/ROM; Elevations; Therapeutic exercise; Endurance training;Patient/family training;Equipment eval/education; Bed mobility;Gait training   PT Frequency Other (Comment)  (3-5x/week)   Recommendation   PT Discharge Recommendation Return to previous environment with social support  (home with family care)   Equipment Recommended Other (Comment)  (knee scooter)   PT - OK to Discharge No  (pending stair training if pt agreeable)   Barthel Index   Feeding 10   Bathing 0   Grooming Score 5   Dressing Score 10   Bladder Score 10   Bowels Score 10   Toilet Use Score 5   Transfers (Bed/Chair) Score 10   Mobility (Level Surface) Score 0   Stairs Score 0   Barthel Index Score 60     Treatment:  16:18-16:30  TA: Performed locomotion on knee scooter to improve travel distances, household distances  VC's for safety with rotational turns, pacing, braking function  Pt able to navigate 2 obstacles with close supervision, 1x LOB with rotational turn on scooter, Tanner to correct  Pt able to perform k-turn with short retro ambulation in narrow space with cues for pacing and safety  Mild GUILLAUME, reports no increase in pain  VC's for safety with transfer from chair to scooter, braking mechanism prior to transfer        Lamonte Crystal, PT, DPT

## 2020-09-15 VITALS
RESPIRATION RATE: 15 BRPM | DIASTOLIC BLOOD PRESSURE: 69 MMHG | WEIGHT: 188 LBS | HEART RATE: 57 BPM | TEMPERATURE: 98.4 F | OXYGEN SATURATION: 97 % | HEIGHT: 64 IN | SYSTOLIC BLOOD PRESSURE: 155 MMHG | BODY MASS INDEX: 32.1 KG/M2

## 2020-09-15 PROBLEM — L97.513 ULCER OF RIGHT FOOT WITH NECROSIS OF MUSCLE (HCC): Status: RESOLVED | Noted: 2020-09-09 | Resolved: 2020-09-15

## 2020-09-15 PROBLEM — E11.621 DIABETIC ULCER OF RIGHT MIDFOOT ASSOCIATED WITH TYPE 2 DIABETES MELLITUS, WITH FAT LAYER EXPOSED (HCC): Status: RESOLVED | Noted: 2018-11-20 | Resolved: 2020-09-15

## 2020-09-15 PROBLEM — L97.412: Status: RESOLVED | Noted: 2020-09-09 | Resolved: 2020-09-15

## 2020-09-15 PROBLEM — L97.412 DIABETIC ULCER OF RIGHT MIDFOOT ASSOCIATED WITH TYPE 2 DIABETES MELLITUS, WITH FAT LAYER EXPOSED (HCC): Status: RESOLVED | Noted: 2018-11-20 | Resolved: 2020-09-15

## 2020-09-15 LAB
GLUCOSE SERPL-MCNC: 104 MG/DL (ref 65–140)
GLUCOSE SERPL-MCNC: 157 MG/DL (ref 65–140)

## 2020-09-15 PROCEDURE — 99024 POSTOP FOLLOW-UP VISIT: CPT | Performed by: PODIATRIST

## 2020-09-15 PROCEDURE — NC001 PR NO CHARGE: Performed by: PODIATRIST

## 2020-09-15 PROCEDURE — 82948 REAGENT STRIP/BLOOD GLUCOSE: CPT

## 2020-09-15 RX ORDER — METRONIDAZOLE 500 MG/1
500 TABLET ORAL EVERY 8 HOURS SCHEDULED
Qty: 15 TABLET | Refills: 0 | Status: SHIPPED | OUTPATIENT
Start: 2020-09-15 | End: 2020-09-20

## 2020-09-15 RX ORDER — GABAPENTIN 300 MG/1
300 CAPSULE ORAL 3 TIMES DAILY
Qty: 90 CAPSULE | Refills: 0 | Status: SHIPPED | OUTPATIENT
Start: 2020-09-15 | End: 2020-10-12 | Stop reason: SDUPTHER

## 2020-09-15 RX ORDER — ASPIRIN 325 MG
325 TABLET, DELAYED RELEASE (ENTERIC COATED) ORAL DAILY
Qty: 30 TABLET | Refills: 0 | Status: SHIPPED | OUTPATIENT
Start: 2020-09-15 | End: 2020-10-07

## 2020-09-15 RX ORDER — DOXYCYCLINE HYCLATE 100 MG/1
100 CAPSULE ORAL EVERY 12 HOURS SCHEDULED
Qty: 10 CAPSULE | Refills: 0 | Status: SHIPPED | OUTPATIENT
Start: 2020-09-15 | End: 2020-09-20

## 2020-09-15 RX ORDER — DOXYCYCLINE HYCLATE 100 MG/1
100 CAPSULE ORAL EVERY 12 HOURS SCHEDULED
Status: DISCONTINUED | OUTPATIENT
Start: 2020-09-15 | End: 2020-09-15 | Stop reason: HOSPADM

## 2020-09-15 RX ADMIN — FAMOTIDINE 20 MG: 20 TABLET ORAL at 08:51

## 2020-09-15 RX ADMIN — LISINOPRIL 30 MG: 10 TABLET ORAL at 08:50

## 2020-09-15 RX ADMIN — CEFEPIME HYDROCHLORIDE 2000 MG: 2 INJECTION, POWDER, FOR SOLUTION INTRAVENOUS at 01:57

## 2020-09-15 RX ADMIN — GABAPENTIN 300 MG: 300 CAPSULE ORAL at 08:50

## 2020-09-15 RX ADMIN — DOXYCYCLINE 100 MG: 100 CAPSULE ORAL at 08:50

## 2020-09-15 RX ADMIN — METRONIDAZOLE 500 MG: 500 TABLET ORAL at 13:08

## 2020-09-15 RX ADMIN — METRONIDAZOLE 500 MG: 500 TABLET ORAL at 04:56

## 2020-09-15 RX ADMIN — ATORVASTATIN CALCIUM 20 MG: 20 TABLET, FILM COATED ORAL at 08:51

## 2020-09-15 RX ADMIN — INSULIN LISPRO 1 UNITS: 100 INJECTION, SOLUTION INTRAVENOUS; SUBCUTANEOUS at 11:33

## 2020-09-15 RX ADMIN — NICOTINE 1 PATCH: 14 PATCH TRANSDERMAL at 08:51

## 2020-09-15 RX ADMIN — HEPARIN SODIUM 5000 UNITS: 5000 INJECTION INTRAVENOUS; SUBCUTANEOUS at 04:56

## 2020-09-15 NOTE — DISCHARGE SUMMARY
PODIATRY DISCHARGE SUMMARY     Patient Name: Gerline Scheuermann   Age & Sex: 63 y o  male   MRN: [id number]  Unit/Bed#: [facility] 827-01   Encounter: [id number]  Length of Stay: 6 days    Active Problems:    BMI 32 0-32 9,adult    Cigar smoker    Pipe smoker    DM (diabetes mellitus), type 2 with neurological complications ([facility] )    GERD (gastroesophageal reflux disease)    Essential hypertension    Type 2 diabetes mellitus with diabetic polyneuropathy ([facility] )      No new Assessment & Plan notes have been filed under this hospital service since the last note was generated  Service: [address] [place] [address] Nw COURSE     Patient was admitted to the hospital 09/09/2020 secondary to wound over the Achilles tendon wound over lateral aspect of the right foot with concurrent infection  Patient was admitted for IV antibiotics and surgical debridement and closure of right lower extremity wounds  While inpatient x-ray was ordered which showed no signs of osteomyelitis  An MRI was also ordered showing osteomyelitis of the residual 5th metatarsal   Infectious Disease and Internal Medicine were consulted for antibiotic guidance and medical management of the patient  After preoperative clearance was obtained patient was taken to the operating Room 913 for right 5th metatarsal resection and a tendo-Achilles lengthening with closure of posterior leg wound  Surgery was a presumed surgical cure for osteomyelitis of the right lower extremity  Infectious Disease recommended discharging patient on oral antibiotics doxycycline and Flagyl through 09/20/2020  Physical therapy and occupational therapy evaluated the patient and recommended that the patient be discharged home with social support  Patient was cleared from all surfaces and discharge from the hospital 09/15/2020  Patient was also discharged on full-strength aspirin for anticoagulation    Patient will follow with Dr Luh Tan in the outpatient setting for close podiatric follow-up  DISCHARGE INFORMATION     PCP at Discharge: Dmitry Briseno DO    Admitting Provider: Yola Wells  Admission Date: 9/9/2020     Discharge Provider: Yola Wells  Discharge Date: 09/15/20    Discharge Disposition: Home  Discharge Condition: Good  Discharge with Lines: No  Discharge Diet:  Regular  Activity Restrictions: NWB to right lower extremity  Test Results Pending at Discharge:  None  Medications at Discharge: See after visit summary for reconciled discharge medications provided to patient and family  Discharge Diagnoses: Active Problems:    BMI 32 0-32 9,adult    Cigar smoker    Pipe smoker    DM (diabetes mellitus), type 2 with neurological complications ([facility] )    GERD (gastroesophageal reflux disease)    Essential hypertension    Type 2 diabetes mellitus with diabetic polyneuropathy ([facility] )      Consulting Providers:  [facility] Internal Medicine  [facility] Infectious Disease    Diagnostic & Therapeutic Procedures Performed:  Xr Chest Pa & Lateral    Result Date: 9/10/2020  Impression: No acute cardiopulmonary disease  Workstation performed: [id number]     Xr Foot Right 3+ Views    Result Date: 9/14/2020  Impression: Status post right metatarsal base resection  Workstation performed: [id number]     Xr Foot 3+ Vw Right    Result Date: 9/10/2020  Impression: No radiographic sign of osteomyelitis  No fracture or dislocation  Workstation performed: [id number]     Mri Ankle/heel Right W Wo Contrast    Result Date: 9/11/2020  Impression: Findings consistent with osteomyelitis of the residual 5th metatarsal   Overlying plantar-lateral soft tissue ulcer  No drainable abscess  The study was marked in [facility] for immediate notification   Workstation performed: [id number]       Code Status: Level 1 - Full Code  Advance Directive and Living Will:      Power of :    POLST:      FOLLOW-UP     PCP Outpatient Follow-up:    Consulting Providers Follow-up:    Active Issues Requiring Follow-Up:    Discharge Statement:  I spent 25 minutes discharging the patient  This time was spent on the day of discharge  I had direct contact with the patient on the day of discharge  Additional documentation is required if more than 30 minutes were spent on discharge  Portions of the record may have been created with voice recognition software  Occasional wrong word or "sound a like" substitutions may have occurred due to the inherent limitations of voice recognition software    Read the chart carefully and recognize, using context, where substitutions have occurred   ==  [facility], [address] [place] [address]  Podiatric Medicine & Surgery PGY-2

## 2020-09-15 NOTE — DISCHARGE INSTRUCTIONS
Discharge Instructions - Podiatry    Weight Bearing Status: Non-weight bearing to RLE                   Pain: Continue analgesics as directed    Follow-up appointment instructions: Please make an appointment within one week of discharge with Dr Beatrice Hills  Contact sooner if any increase in pain, or signs of infection occur    Wound Care: Leave dressings clean, dry, and intact between professional dressing changes or first Office visit  Please apply Alginate dressing followed by DSD and ACE every other day

## 2020-09-15 NOTE — PLAN OF CARE
Problem: Potential for Falls  Goal: Patient will remain free of falls  Description: INTERVENTIONS:  - Assess patient frequently for physical needs  -  Identify cognitive and physical deficits and behaviors that affect risk of falls    -  [place] fall precautions as indicated by assessment   - Educate patient/family on patient safety including physical limitations  - Instruct patient to call for assistance with activity based on assessment  - Modify environment to reduce risk of injury  - Consider OT/PT consult to assist with strengthening/mobility  Outcome: Progressing     Problem: PAIN - ADULT  Goal: Verbalizes/displays adequate comfort level or baseline comfort level  Description: Interventions:  - Encourage patient to monitor pain and request assistance  - Assess pain using appropriate pain scale  - Administer analgesics based on type and severity of pain and evaluate response  - Implement non-pharmacological measures as appropriate and evaluate response  - Consider cultural and social influences on pain and pain management  - Notify physician/advanced practitioner if interventions unsuccessful or patient reports new pain  Outcome: Progressing     Problem: INFECTION - ADULT  Goal: Absence or prevention of progression during hospitalization  Description: INTERVENTIONS:  - Assess and monitor for signs and symptoms of infection  - Monitor lab/diagnostic results  - Monitor all insertion sites, i e  indwelling lines, tubes, and drains  - Monitor endotracheal if appropriate and nasal secretions for changes in amount and color  - [place] appropriate cooling/warming therapies per order  - Administer medications as ordered  - Instruct and encourage patient and family to use good hand hygiene technique  - Identify and instruct in appropriate isolation precautions for identified infection/condition  Outcome: Progressing  Goal: Absence of fever/infection during neutropenic period  Description: INTERVENTIONS:  - Monitor WBC    Outcome: Progressing     Problem: SAFETY ADULT  Goal: Patient will remain free of falls  Description: INTERVENTIONS:  - Assess patient frequently for physical needs  -  Identify cognitive and physical deficits and behaviors that affect risk of falls    -  [place] fall precautions as indicated by assessment   - Educate patient/family on patient safety including physical limitations  - Instruct patient to call for assistance with activity based on assessment  - Modify environment to reduce risk of injury  - Consider OT/PT consult to assist with strengthening/mobility  Outcome: Progressing  Goal: Maintain or return to baseline ADL function  Description: INTERVENTIONS:  -  Assess patient's ability to carry out ADLs; assess patient's baseline for ADL function and identify physical deficits which impact ability to perform ADLs (bathing, care of mouth/teeth, toileting, grooming, dressing, etc )  - Assess/evaluate cause of self-care deficits   - Assess range of motion  - Assess patient's mobility; develop plan if impaired  - Assess patient's need for assistive devices and provide as appropriate  - Encourage maximum independence but intervene and supervise when necessary  - Involve family in performance of ADLs  - Assess for home care needs following discharge   - Consider OT consult to assist with ADL evaluation and planning for discharge  - Provide patient education as appropriate  Outcome: Progressing  Goal: Maintain or return mobility status to optimal level  Description: INTERVENTIONS:  - Assess patient's baseline mobility status (ambulation, transfers, stairs, etc )    - Identify cognitive and physical deficits and behaviors that affect mobility  - Identify mobility aids required to assist with transfers and/or ambulation (gait belt, sit-to-stand, lift, walker, cane, etc )  - [place] fall precautions as indicated by assessment  - Record patient progress and toleration of activity level on Mobility SBAR; progress patient to next Phase/Stage  - Instruct patient to call for assistance with activity based on assessment  - Consider rehabilitation consult to assist with strengthening/weightbearing, etc   Outcome: Progressing     Problem: DISCHARGE PLANNING  Goal: Discharge to home or other facility with appropriate resources  Description: INTERVENTIONS:  - Identify barriers to discharge w/patient and caregiver  - Arrange for needed discharge resources and transportation as appropriate  - Identify discharge learning needs (meds, wound care, etc )  - Arrange for interpretive services to assist at discharge as needed  - Refer to Case Management Department for coordinating discharge planning if the patient needs post-hospital services based on physician/advanced practitioner order or complex needs related to functional status, cognitive ability, or social support system  Outcome: Progressing     Problem: Knowledge Deficit  Goal: Patient/family/caregiver demonstrates understanding of disease process, treatment plan, medications, and discharge instructions  Description: Complete learning assessment and assess knowledge base    Interventions:  - Provide teaching at level of understanding  - Provide teaching via preferred learning methods  Outcome: Progressing     Problem: Prexisting or High Potential for Compromised Skin Integrity  Goal: Skin integrity is maintained or improved  Description: INTERVENTIONS:  - Identify patients at risk for skin breakdown  - Assess and monitor skin integrity  - Assess and monitor nutrition and hydration status  - Monitor labs   - Assess for incontinence   - Turn and reposition patient  - Assist with mobility/ambulation  - Relieve pressure over bony prominences  - Avoid friction and shearing  - Provide appropriate hygiene as needed including keeping skin clean and dry  - Evaluate need for skin moisturizer/barrier cream  - Collaborate with interdisciplinary team   - Patient/family teaching  - Consider wound care consult   Outcome: Progressing     Problem: SKIN/TISSUE INTEGRITY - ADULT  Goal: Skin integrity remains intact  Description: INTERVENTIONS  - Identify patients at risk for skin breakdown  - Assess and monitor skin integrity  - Assess and monitor nutrition and hydration status  - Monitor labs (i e  albumin)  - Assess for incontinence   - Turn and reposition patient  - Assist with mobility/ambulation  - Relieve pressure over bony prominences  - Avoid friction and shearing  - Provide appropriate hygiene as needed including keeping skin clean and dry  - Evaluate need for skin moisturizer/barrier cream  - Collaborate with interdisciplinary team (i e  Nutrition, Rehabilitation, etc )   - Patient/family teaching  Outcome: Progressing  Goal: Incision(s), wounds(s) or drain site(s) healing without S/S of infection  Description: INTERVENTIONS  - Assess and document risk factors for skin impairment   - Assess and document dressing, incision, wound bed, drain sites and surrounding tissue  - Consider nutrition services referral as needed  - Oral mucous membranes remain intact  - Provide patient/ family education  Outcome: Progressing     Problem: MUSCULOSKELETAL - ADULT  Goal: Maintain or return mobility to safest level of function  Description: INTERVENTIONS:  - Assess patient's ability to carry out ADLs; assess patient's baseline for ADL function and identify physical deficits which impact ability to perform ADLs (bathing, care of mouth/teeth, toileting, grooming, dressing, etc )  - Assess/evaluate cause of self-care deficits   - Assess range of motion  - Assess patient's mobility  - Assess patient's need for assistive devices and provide as appropriate  - Encourage maximum independence but intervene and supervise when necessary  - Involve family in performance of ADLs  - Assess for home care needs following discharge   - Consider OT consult to assist with ADL evaluation and planning for discharge  - Provide patient education as appropriate  Outcome: Progressing  Goal: Maintain proper alignment of affected body part  Description: INTERVENTIONS:  - Support, maintain and protect limb and body alignment  - Provide patient/ family with appropriate education  Outcome: Progressing

## 2020-09-15 NOTE — PROGRESS NOTES
Podiatry - Progress Note  Patient: Nnamdi Hutchinson 63 y o  male   MRN: [id number]  PCP: Magaly Greenberg DO  Unit/Bed#: [facility] 827-01 Encounter: [id number]  Date Of Visit: 09/15/20    ASSESSMENT:    Nnamdi Hutchinson is a 63 y o  male with:    1  Right lateral foot ulceration w/ OM of residual 5th metatarsal confirmed by MRI  - status post right 5th metatarsal resection and tendo-Achilles lengthening (DOS 2020)  2  Right distal posterior leg ulceration - Almanzar 1 - POA  3  Type 2 diabetes with peripheral neuropathy (A1c 6 4% 2020)  4  History of left leg BKA  5  History of right foot TMA  6  Obesity      PLAN:    · Patient is 2 days status post right 5th metatarsal resection and tendo-Achilles lengthening  The surgical sites appear well coapted with no signs of dehiscence or clinical signs of infection at this time  · Postoperative pain is well controlled  Continue current pain management regimen  · Will transition patient to doxycycline 100 mg p o  Q 12 hours and Flagyl 500 mg p o  Q 8 hours through 2020 per Infectious Disease  · Continue nonweightbearing to right lower extremity  · Appreciate medical management per Internal Medicine  · PT/OT recommending discharged to previous environment with social support  Patient will be discharged today  · Patient will follow with Dr Alicia Goldsmith for close outpatient podiatric follow-up  · Rest of medical care per primary team        SUBJECTIVE:     The patient was seen, evaluated, and assessed at bedside today  The patient was awake, alert, and in no acute distress  No acute events overnight  The patient reports he is amenable to plan  Patient denies N/V/F/chills/SOB/CP        OBJECTIVE:     Vitals:   /69   Pulse 57   Temp 98 4 °F (36 9 °C)   Resp 15   Ht 5' 4" (1 626 m)   Wt 85 3 kg (188 lb)   SpO2 97%   BMI 32 27 kg/m²     Temp (24hrs), Av 4 °F (36 9 °C), Min:97 9 °F (36 6 °C), Max:98 8 °F (37 1 °C)      Physical Exam:     General: Alert, cooperative, and in no distress  Lower extremity exam:  Cardiovascular status at baseline  Neurological status at baseline  Musculoskeletal status at baseline  No calf tenderness noted  Surgical site noted of the posterior aspect of the Achilles right foot  As well as the lateral aspect of the right foot the 5th metatarsal   Surgical sites appear stable with skin edges well coapted  Sutures remain intact  No signs of dehiscence or clinical signs of infection appreciated this time  See images below  Clinical Images 09/15/20:    Right lateral foot    Right posterior leg      Additional Data:     Labs:    Results from last 7 days   Lab Units 09/14/20  0529   WBC Thousand/uL 7 70   HEMOGLOBIN g/dL 13 4   HEMATOCRIT % 39 8   PLATELETS Thousands/uL 192   NEUTROS PCT % 69   LYMPHS PCT % 15   MONOS PCT % 12   EOS PCT % 2     Results from last 7 days   Lab Units 09/14/20  0529  09/09/20  1850   POTASSIUM mmol/L 4 3   < > 4 3   CHLORIDE mmol/L 110*   < > 106   CO2 mmol/L 24   < > 27   BUN mg/dL 24   < > 24   CREATININE mg/dL 0 94   < > 1 11   CALCIUM mg/dL 8 6   < > 9 6   ALK PHOS U/L  --   --  88   ALT U/L  --   --  25   AST U/L  --   --  18    < > = values in this interval not displayed  * I Have Reviewed All Lab Data Listed Above  Recent Cultures (last 7 days):     Results from last 7 days   Lab Units 09/09/20 1937 09/09/20  1832   BLOOD CULTURE  No Growth After 5 Days  No Growth After 5 Days  --    GRAM STAIN RESULT   --  No Polys or Bacteria seen   WOUND CULTURE   --  1+ Growth of Enterobacter cloacae complex*  Few Colonies of Staphylococcus coagulase negative*     Results from last 7 days   Lab Units 09/09/20  1832   ANAEROBIC CULTURE  No anaerobes isolated       Imaging: I have personally reviewed pertinent films in PACS  EKG, Pathology, and Other Studies: I have personally reviewed pertinent reports      ** Please Note: Portions of the record may have been created with voice recognition software  Occasional wrong word or "sound a like" substitutions may have occurred due to the inherent limitations of voice recognition software  Read the chart carefully and recognize, using context, where substitutions have occurred   **

## 2020-09-15 NOTE — PLAN OF CARE
Problem: Potential for Falls  Goal: Patient will remain free of falls  Description: INTERVENTIONS:  - Assess patient frequently for physical needs  -  Identify cognitive and physical deficits and behaviors that affect risk of falls    -  [place] fall precautions as indicated by assessment   - Educate patient/family on patient safety including physical limitations  - Instruct patient to call for assistance with activity based on assessment  - Modify environment to reduce risk of injury  - Consider OT/PT consult to assist with strengthening/mobility  9/15/2020 1244 by Alexander Durant RN  Outcome: Completed  9/15/2020 0716 by Alexander Durant RN  Outcome: Progressing     Problem: PAIN - ADULT  Goal: Verbalizes/displays adequate comfort level or baseline comfort level  Description: Interventions:  - Encourage patient to monitor pain and request assistance  - Assess pain using appropriate pain scale  - Administer analgesics based on type and severity of pain and evaluate response  - Implement non-pharmacological measures as appropriate and evaluate response  - Consider cultural and social influences on pain and pain management  - Notify physician/advanced practitioner if interventions unsuccessful or patient reports new pain  9/15/2020 1244 by Alexander Durant RN  Outcome: Completed  9/15/2020 0716 by Alexander Durant RN  Outcome: Progressing     Problem: INFECTION - ADULT  Goal: Absence or prevention of progression during hospitalization  Description: INTERVENTIONS:  - Assess and monitor for signs and symptoms of infection  - Monitor lab/diagnostic results  - Monitor all insertion sites, i e  indwelling lines, tubes, and drains  - Monitor endotracheal if appropriate and nasal secretions for changes in amount and color  - [place] appropriate cooling/warming therapies per order  - Administer medications as ordered  - Instruct and encourage patient and family to use good hand hygiene technique  - Identify and instruct in appropriate isolation precautions for identified infection/condition  9/15/2020 1244 by Shankar Ramos RN  Outcome: Completed  9/15/2020 0716 by Shankar Ramos RN  Outcome: Progressing  Goal: Absence of fever/infection during neutropenic period  Description: INTERVENTIONS:  - Monitor WBC    9/15/2020 1244 by Shankar Ramos RN  Outcome: Completed  9/15/2020 0716 by Shankar Ramos RN  Outcome: Progressing     Problem: SAFETY ADULT  Goal: Patient will remain free of falls  Description: INTERVENTIONS:  - Assess patient frequently for physical needs  -  Identify cognitive and physical deficits and behaviors that affect risk of falls    -  [place] fall precautions as indicated by assessment   - Educate patient/family on patient safety including physical limitations  - Instruct patient to call for assistance with activity based on assessment  - Modify environment to reduce risk of injury  - Consider OT/PT consult to assist with strengthening/mobility  9/15/2020 1244 by Shankar Ramos RN  Outcome: Completed  9/15/2020 0716 by Shankar Ramos RN  Outcome: Progressing  Goal: Maintain or return to baseline ADL function  Description: INTERVENTIONS:  -  Assess patient's ability to carry out ADLs; assess patient's baseline for ADL function and identify physical deficits which impact ability to perform ADLs (bathing, care of mouth/teeth, toileting, grooming, dressing, etc )  - Assess/evaluate cause of self-care deficits   - Assess range of motion  - Assess patient's mobility; develop plan if impaired  - Assess patient's need for assistive devices and provide as appropriate  - Encourage maximum independence but intervene and supervise when necessary  - Involve family in performance of ADLs  - Assess for home care needs following discharge   - Consider OT consult to assist with ADL evaluation and planning for discharge  - Provide patient education as appropriate  9/15/2020 1244 by Renny Lomax Mara Mooney RN  Outcome: Completed  9/15/2020 0716 by Alecia Mendiola RN  Outcome: Progressing  Goal: Maintain or return mobility status to optimal level  Description: INTERVENTIONS:  - Assess patient's baseline mobility status (ambulation, transfers, stairs, etc )    - Identify cognitive and physical deficits and behaviors that affect mobility  - Identify mobility aids required to assist with transfers and/or ambulation (gait belt, sit-to-stand, lift, walker, cane, etc )  - [place] fall precautions as indicated by assessment  - Record patient progress and toleration of activity level on Mobility SBAR; progress patient to next Phase/Stage  - Instruct patient to call for assistance with activity based on assessment  - Consider rehabilitation consult to assist with strengthening/weightbearing, etc   9/15/2020 1244 by Alecia Mendiola RN  Outcome: Completed  9/15/2020 0716 by Alecia Mendiola RN  Outcome: Progressing     Problem: DISCHARGE PLANNING  Goal: Discharge to home or other facility with appropriate resources  Description: INTERVENTIONS:  - Identify barriers to discharge w/patient and caregiver  - Arrange for needed discharge resources and transportation as appropriate  - Identify discharge learning needs (meds, wound care, etc )  - Arrange for interpretive services to assist at discharge as needed  - Refer to Case Management Department for coordinating discharge planning if the patient needs post-hospital services based on physician/advanced practitioner order or complex needs related to functional status, cognitive ability, or social support system  9/15/2020 1244 by Alecia Mendiola RN  Outcome: Completed  9/15/2020 0716 by Alecia Mendiola RN  Outcome: Progressing     Problem: Knowledge Deficit  Goal: Patient/family/caregiver demonstrates understanding of disease process, treatment plan, medications, and discharge instructions  Description: Complete learning assessment and assess knowledge base   Interventions:  - Provide teaching at level of understanding  - Provide teaching via preferred learning methods  9/15/2020 1244 by Vincenzo Giraldo RN  Outcome: Completed  9/15/2020 0716 by Vincenzo Giraldo RN  Outcome: Progressing     Problem: PHYSICAL THERAPY ADULT  Goal: Performs mobility at highest level of function for planned discharge setting  See evaluation for individualized goals  Description: Treatment/Interventions: Functional transfer training, LE strengthening/ROM, Elevations, Therapeutic exercise, Endurance training, Patient/family training, Equipment eval/education, Bed mobility, Gait training  Equipment Recommended: Other (Comment)(knee scooter)       See flowsheet documentation for full assessment, interventions and recommendations    Outcome: Completed     Problem: Prexisting or High Potential for Compromised Skin Integrity  Goal: Skin integrity is maintained or improved  Description: INTERVENTIONS:  - Identify patients at risk for skin breakdown  - Assess and monitor skin integrity  - Assess and monitor nutrition and hydration status  - Monitor labs   - Assess for incontinence   - Turn and reposition patient  - Assist with mobility/ambulation  - Relieve pressure over bony prominences  - Avoid friction and shearing  - Provide appropriate hygiene as needed including keeping skin clean and dry  - Evaluate need for skin moisturizer/barrier cream  - Collaborate with interdisciplinary team   - Patient/family teaching  - Consider wound care consult   9/15/2020 1244 by Vincenzo Giraldo RN  Outcome: Completed  9/15/2020 0716 by Vincenzo Giraldo RN  Outcome: Progressing     Problem: SKIN/TISSUE INTEGRITY - ADULT  Goal: Skin integrity remains intact  Description: INTERVENTIONS  - Identify patients at risk for skin breakdown  - Assess and monitor skin integrity  - Assess and monitor nutrition and hydration status  - Monitor labs (i e  albumin)  - Assess for incontinence   - Turn and reposition patient  - Assist with mobility/ambulation  - Relieve pressure over bony prominences  - Avoid friction and shearing  - Provide appropriate hygiene as needed including keeping skin clean and dry  - Evaluate need for skin moisturizer/barrier cream  - Collaborate with interdisciplinary team (i e  Nutrition, Rehabilitation, etc )   - Patient/family teaching  9/15/2020 1244 by Álvaro Santos RN  Outcome: Completed  9/15/2020 0716 by Álvaro Santos RN  Outcome: Progressing  Goal: Incision(s), wounds(s) or drain site(s) healing without S/S of infection  Description: INTERVENTIONS  - Assess and document risk factors for skin impairment   - Assess and document dressing, incision, wound bed, drain sites and surrounding tissue  - Consider nutrition services referral as needed  - Oral mucous membranes remain intact  - Provide patient/ family education  9/15/2020 1244 by Álvaro Santos RN  Outcome: Completed  9/15/2020 0716 by Álvaro Santos RN  Outcome: Progressing     Problem: MUSCULOSKELETAL - ADULT  Goal: Maintain or return mobility to safest level of function  Description: INTERVENTIONS:  - Assess patient's ability to carry out ADLs; assess patient's baseline for ADL function and identify physical deficits which impact ability to perform ADLs (bathing, care of mouth/teeth, toileting, grooming, dressing, etc )  - Assess/evaluate cause of self-care deficits   - Assess range of motion  - Assess patient's mobility  - Assess patient's need for assistive devices and provide as appropriate  - Encourage maximum independence but intervene and supervise when necessary  - Involve family in performance of ADLs  - Assess for home care needs following discharge   - Consider OT consult to assist with ADL evaluation and planning for discharge  - Provide patient education as appropriate  9/15/2020 1244 by Álvaro Santos RN  Outcome: Completed  9/15/2020 0716 by Álvaro Santos RN  Outcome: Progressing  Goal: Maintain proper alignment of affected body part  Description: INTERVENTIONS:  - Support, maintain and protect limb and body alignment  - Provide patient/ family with appropriate education  9/15/2020 1244 by Catalina Bustamante RN  Outcome: Completed  9/15/2020 0716 by Catalina Bustamante, RN  Outcome: Progressing

## 2020-09-17 ENCOUNTER — TELEPHONE (OUTPATIENT)
Dept: PODIATRY | Facility: CLINIC | Age: 65
End: 2020-09-17

## 2020-09-17 NOTE — TELEPHONE ENCOUNTER
Deanna Lundberg from the visiting nurses called, they will be starting home visits and the Occupational therapist will be going to see him as well      Any questions call Deanna Lundberg @[phone]

## 2020-09-18 ENCOUNTER — OFFICE VISIT (OUTPATIENT)
Dept: PODIATRY | Facility: CLINIC | Age: 65
End: 2020-09-18
Payer: COMMERCIAL

## 2020-09-18 VITALS
SYSTOLIC BLOOD PRESSURE: 158 MMHG | HEIGHT: 64 IN | HEART RATE: 90 BPM | BODY MASS INDEX: 32.27 KG/M2 | DIASTOLIC BLOOD PRESSURE: 70 MMHG

## 2020-09-18 DIAGNOSIS — L97.319 LOWER LIMB ULCER, ANKLE, RIGHT, WITH UNSPECIFIED SEVERITY (HCC): ICD-10-CM

## 2020-09-18 DIAGNOSIS — L97.519 ULCER OF RIGHT FOOT, UNSPECIFIED ULCER STAGE (HCC): Primary | ICD-10-CM

## 2020-09-18 DIAGNOSIS — E11.40 TYPE 2 DIABETES MELLITUS WITH DIABETIC NEUROPATHY, WITHOUT LONG-TERM CURRENT USE OF INSULIN (HCC): ICD-10-CM

## 2020-09-18 PROCEDURE — 29515 APPLICATION SHORT LEG SPLINT: CPT | Performed by: PODIATRIST

## 2020-09-18 NOTE — PROGRESS NOTES
PATIENT:  Alexandra Rolle      1955    ASSESSMENT     1  Ulcer of right foot, unspecified ulcer stage ([facility])  Splint, Casting, Strapping   2  Lower limb ulcer, ankle, right, with unspecified severity ([facility])  Splint, Casting, Strapping   3  Type 2 diabetes mellitus with diabetic neuropathy, without long-term current use of insulin ([facility])  Splint, Casting, Strapping          PLAN  Patient is doing well post-operatively  Sutures/ Staples left intact  Incision was cleaned with betadine and DSD applied to be kept C/D/I  Short leg splint reapplied  Continue post-op care as instructed  Stressed on patient compliance about proper off-loading, staying off of feet, and proper dressing care  Call if any increase in pain, fevers, calf pain, shortness of breath, or general distress is noted  Patient instructed to go to ER if call is not returned immediately  Splint, Casting, Strapping    Date/Time: 9/18/2020 9:31 AM  Performed by: Boni Falk DPM  Authorized by: Boni Falk DPM     Consent:     Consent obtained:  Verbal    Consent given by:  Patient    Risks discussed:  Numbness and pain    Alternatives discussed:  Alternative treatment  Pre-procedure details:     Sensation:  Numbness  Procedure details:     Laterality:  Right    Location:  Ankle    Ankle:  R ankle    Splint type:  Short leg    Supplies:  Ortho-Glass, cotton padding, elastic bandage and sling      HISTORY OF PRESENT ILLNESS  Patient presents for post-op appointment  Pain is minimal   The patient is feeling well and in good spirits  Patient reported no post-op concern  He presents with wheelchair today  He uses knee scooter at home  REVIEW OF SYSTEMS  Patient denied CP, SOB, fever, chills, palpatation, HA, GI problem, or calf pain  PHYSICAL EXAMINATION  GENERAL  The patient appears in NAD / non-toxic  Afebrile  VSS    VASCULAR EXAM  Pedal pulses and vascular status are intact  No calf pain or edema bilaterally    No cyanosis  DERMATOLOGIC EXAM  Incisions are coapted and healing well  No signs of infection  No active drainage  Normal post-op edema and ecchymosis  No necrosis or dehiscence  NEUROLOGIC EXAM  AAO X 3  No focal neurologic deficit  Neurologic status is intact BLE  MUSCULOSKELETAL EXAM  Normal post-op findings  ROM intact  No fluctuation or crepitus

## 2020-09-25 ENCOUNTER — OFFICE VISIT (OUTPATIENT)
Dept: PODIATRY | Facility: CLINIC | Age: 65
End: 2020-09-25
Payer: COMMERCIAL

## 2020-09-25 VITALS — BODY MASS INDEX: 32.27 KG/M2 | HEIGHT: 64 IN

## 2020-09-25 DIAGNOSIS — L97.319 LOWER LIMB ULCER, ANKLE, RIGHT, WITH UNSPECIFIED SEVERITY (HCC): ICD-10-CM

## 2020-09-25 DIAGNOSIS — L97.519 ULCER OF RIGHT FOOT, UNSPECIFIED ULCER STAGE (HCC): Primary | ICD-10-CM

## 2020-09-25 DIAGNOSIS — E11.40 TYPE 2 DIABETES MELLITUS WITH DIABETIC NEUROPATHY, WITHOUT LONG-TERM CURRENT USE OF INSULIN (HCC): ICD-10-CM

## 2020-09-25 PROCEDURE — 29515 APPLICATION SHORT LEG SPLINT: CPT | Performed by: PODIATRIST

## 2020-09-25 NOTE — PROGRESS NOTES
PATIENT:  Vignesh Hartmann      1955    ASSESSMENT     1  Ulcer of right foot, unspecified ulcer stage ([facility])  Splint, Casting, Strapping   2  Lower limb ulcer, ankle, right, with unspecified severity ([facility])  Splint, Casting, Strapping   3  Type 2 diabetes mellitus with diabetic neuropathy, without long-term current use of insulin ([facility])  Splint, Casting, Strapping          PLAN  Patient is doing well post-operatively  Sutures left intact  Incision was cleaned with betadine and DSD applied to be kept C/D/I  Short leg splint reapplied  Continue post-op care as instructed  Stressed on patient compliance about proper off-loading, staying off of feet, and proper dressing care  Call if any increase in pain, fevers, calf pain, shortness of breath, or general distress is noted  Patient instructed to go to ER if call is not returned immediately  RA in 1 week for possible suture removal       Splint, Casting, Strapping    Date/Time: 9/25/2020 10:32 AM  Performed by: Whitney Ospina DPM  Authorized by: Whitney Ospina DPM     Consent:     Consent obtained:  Verbal    Risks discussed:  Discoloration, pain and numbness    Alternatives discussed:  Alternative treatment  Pre-procedure details:     Sensation:  Numbness  Procedure details:     Laterality:  Right    Location:  Ankle    Ankle:  R ankle    Splint type:  Short leg    Supplies:  Ortho-Glass, sling, elastic bandage and cotton padding  Post-procedure details:     Pain:  Unchanged    Sensation:  Unchanged    Patient tolerance of procedure: Tolerated well, no immediate complications           HISTORY OF PRESENT ILLNESS  Patient presents for post-op appointment  Pain is minimal   The patient is feeling well and in good spirits  Patient reported no post-op concern  He presents with wheelchair today  REVIEW OF SYSTEMS  Patient denied CP, SOB, fever, chills, palpatation, HA, GI problem, or calf pain       PHYSICAL EXAMINATION  GENERAL  The patient appears in NAD / non-toxic  Afebrile  VSS    VASCULAR EXAM  Pedal pulses and vascular status are intact  No calf pain or edema bilaterally  No cyanosis  DERMATOLOGIC EXAM  Incisions are coapted and healing well  No signs of infection  No active drainage  Swelling is minimal   No necrosis or dehiscence  NEUROLOGIC EXAM  AAO X 3  No focal neurologic deficit  Neurologic status is intact BLE  MUSCULOSKELETAL EXAM  Normal post-op findings  ROM intact  No fluctuation or crepitus

## 2020-10-06 ENCOUNTER — OFFICE VISIT (OUTPATIENT)
Dept: PODIATRY | Facility: CLINIC | Age: 65
End: 2020-10-06
Payer: COMMERCIAL

## 2020-10-06 VITALS — HEIGHT: 64 IN | BODY MASS INDEX: 32.27 KG/M2 | TEMPERATURE: 97.8 F

## 2020-10-06 DIAGNOSIS — L97.319 LOWER LIMB ULCER, ANKLE, RIGHT, WITH UNSPECIFIED SEVERITY (HCC): ICD-10-CM

## 2020-10-06 DIAGNOSIS — L97.519 ULCER OF RIGHT FOOT, UNSPECIFIED ULCER STAGE (HCC): Primary | ICD-10-CM

## 2020-10-06 DIAGNOSIS — E11.40 TYPE 2 DIABETES MELLITUS WITH DIABETIC NEUROPATHY, WITHOUT LONG-TERM CURRENT USE OF INSULIN (HCC): ICD-10-CM

## 2020-10-06 PROCEDURE — 29515 APPLICATION SHORT LEG SPLINT: CPT | Performed by: PODIATRIST

## 2020-10-07 DIAGNOSIS — Z98.890 POST-OPERATIVE STATE: ICD-10-CM

## 2020-10-12 ENCOUNTER — TELEPHONE (OUTPATIENT)
Dept: PODIATRY | Facility: CLINIC | Age: 65
End: 2020-10-12

## 2020-10-12 DIAGNOSIS — IMO0002 TYPE 2 DIABETES MELLITUS, UNCONTROLLED, WITH NEUROPATHY: ICD-10-CM

## 2020-10-12 RX ORDER — GABAPENTIN 300 MG/1
300 CAPSULE ORAL 3 TIMES DAILY
Qty: 90 CAPSULE | Refills: 0 | Status: SHIPPED | OUTPATIENT
Start: 2020-10-12 | End: 2020-11-16

## 2020-10-14 ENCOUNTER — TELEPHONE (OUTPATIENT)
Dept: PODIATRY | Facility: CLINIC | Age: 65
End: 2020-10-14

## 2020-10-20 ENCOUNTER — OFFICE VISIT (OUTPATIENT)
Dept: PODIATRY | Facility: CLINIC | Age: 65
End: 2020-10-20
Payer: COMMERCIAL

## 2020-10-20 VITALS — BODY MASS INDEX: 32.27 KG/M2 | HEIGHT: 64 IN

## 2020-10-20 DIAGNOSIS — L97.419 HEEL ULCERATION, RIGHT, WITH UNSPECIFIED SEVERITY (HCC): Primary | ICD-10-CM

## 2020-10-20 DIAGNOSIS — IMO0002 TYPE 2 DIABETES MELLITUS, UNCONTROLLED, WITH NEUROPATHY: ICD-10-CM

## 2020-10-20 DIAGNOSIS — L97.519 ULCER OF RIGHT FOOT, UNSPECIFIED ULCER STAGE (HCC): ICD-10-CM

## 2020-10-20 PROCEDURE — 99213 OFFICE O/P EST LOW 20 MIN: CPT | Performed by: PODIATRIST

## 2020-11-03 ENCOUNTER — OFFICE VISIT (OUTPATIENT)
Dept: PODIATRY | Facility: CLINIC | Age: 65
End: 2020-11-03
Payer: COMMERCIAL

## 2020-11-03 VITALS — BODY MASS INDEX: 32.27 KG/M2 | HEIGHT: 64 IN

## 2020-11-03 DIAGNOSIS — IMO0002 TYPE 2 DIABETES MELLITUS, UNCONTROLLED, WITH NEUROPATHY: ICD-10-CM

## 2020-11-03 DIAGNOSIS — L97.419 HEEL ULCERATION, RIGHT, WITH UNSPECIFIED SEVERITY (HCC): Primary | ICD-10-CM

## 2020-11-03 PROCEDURE — 99213 OFFICE O/P EST LOW 20 MIN: CPT | Performed by: PODIATRIST

## 2020-11-12 ENCOUNTER — TELEPHONE (OUTPATIENT)
Dept: PODIATRY | Facility: CLINIC | Age: 65
End: 2020-11-12

## 2020-11-14 DIAGNOSIS — IMO0002 TYPE 2 DIABETES MELLITUS, UNCONTROLLED, WITH NEUROPATHY: ICD-10-CM

## 2020-11-16 RX ORDER — GABAPENTIN 300 MG/1
CAPSULE ORAL
Qty: 90 CAPSULE | Refills: 0 | Status: SHIPPED | OUTPATIENT
Start: 2020-11-16 | End: 2020-12-16

## 2020-11-17 ENCOUNTER — PROCEDURE VISIT (OUTPATIENT)
Dept: PODIATRY | Facility: CLINIC | Age: 65
End: 2020-11-17
Payer: COMMERCIAL

## 2020-11-17 VITALS — BODY MASS INDEX: 32.27 KG/M2 | HEIGHT: 64 IN

## 2020-11-17 DIAGNOSIS — L97.419 HEEL ULCERATION, RIGHT, WITH UNSPECIFIED SEVERITY (HCC): ICD-10-CM

## 2020-11-17 DIAGNOSIS — IMO0002 TYPE 2 DIABETES MELLITUS, UNCONTROLLED, WITH NEUROPATHY: Primary | ICD-10-CM

## 2020-11-17 PROCEDURE — 97597 DBRDMT OPN WND 1ST 20 CM/<: CPT | Performed by: PODIATRIST

## 2020-12-01 ENCOUNTER — PROCEDURE VISIT (OUTPATIENT)
Dept: PODIATRY | Facility: CLINIC | Age: 65
End: 2020-12-01
Payer: COMMERCIAL

## 2020-12-01 VITALS — HEIGHT: 64 IN | BODY MASS INDEX: 32.27 KG/M2

## 2020-12-01 DIAGNOSIS — IMO0002 TYPE 2 DIABETES MELLITUS, UNCONTROLLED, WITH NEUROPATHY: ICD-10-CM

## 2020-12-01 DIAGNOSIS — L97.419 HEEL ULCERATION, RIGHT, WITH UNSPECIFIED SEVERITY (HCC): Primary | ICD-10-CM

## 2020-12-01 PROCEDURE — 99213 OFFICE O/P EST LOW 20 MIN: CPT | Performed by: PODIATRIST

## 2020-12-08 ENCOUNTER — TELEPHONE (OUTPATIENT)
Dept: PODIATRY | Facility: CLINIC | Age: 65
End: 2020-12-08

## 2020-12-16 DIAGNOSIS — IMO0002 TYPE 2 DIABETES MELLITUS, UNCONTROLLED, WITH NEUROPATHY: ICD-10-CM

## 2020-12-16 RX ORDER — GABAPENTIN 300 MG/1
CAPSULE ORAL
Qty: 90 CAPSULE | Refills: 0 | Status: SHIPPED | OUTPATIENT
Start: 2020-12-16 | End: 2021-01-17

## 2020-12-22 ENCOUNTER — PROCEDURE VISIT (OUTPATIENT)
Dept: PODIATRY | Facility: CLINIC | Age: 65
End: 2020-12-22
Payer: COMMERCIAL

## 2020-12-22 VITALS — WEIGHT: 189.2 LBS | HEIGHT: 64 IN | BODY MASS INDEX: 32.3 KG/M2

## 2020-12-22 DIAGNOSIS — L97.419 HEEL ULCERATION, RIGHT, WITH UNSPECIFIED SEVERITY (HCC): ICD-10-CM

## 2020-12-22 DIAGNOSIS — Z89.512 ABSENCE OF LEFT LOWER LEG BELOW KNEE (HCC): ICD-10-CM

## 2020-12-22 DIAGNOSIS — IMO0002 TYPE 2 DIABETES MELLITUS, UNCONTROLLED, WITH NEUROPATHY: Primary | ICD-10-CM

## 2020-12-22 PROCEDURE — 99213 OFFICE O/P EST LOW 20 MIN: CPT | Performed by: PODIATRIST

## 2021-01-17 DIAGNOSIS — IMO0002 TYPE 2 DIABETES MELLITUS, UNCONTROLLED, WITH NEUROPATHY: ICD-10-CM

## 2021-01-17 RX ORDER — GABAPENTIN 300 MG/1
CAPSULE ORAL
Qty: 90 CAPSULE | Refills: 0 | Status: SHIPPED | OUTPATIENT
Start: 2021-01-17 | End: 2021-02-15

## 2021-02-15 DIAGNOSIS — IMO0002 TYPE 2 DIABETES MELLITUS, UNCONTROLLED, WITH NEUROPATHY: ICD-10-CM

## 2021-02-15 RX ORDER — GABAPENTIN 300 MG/1
CAPSULE ORAL
Qty: 90 CAPSULE | Refills: 0 | Status: SHIPPED | OUTPATIENT
Start: 2021-02-15 | End: 2021-03-17

## 2021-02-23 ENCOUNTER — OFFICE VISIT (OUTPATIENT)
Dept: PODIATRY | Facility: CLINIC | Age: 66
End: 2021-02-23
Payer: COMMERCIAL

## 2021-02-23 VITALS
WEIGHT: 186.6 LBS | HEIGHT: 64 IN | HEART RATE: 69 BPM | DIASTOLIC BLOOD PRESSURE: 77 MMHG | BODY MASS INDEX: 31.86 KG/M2 | SYSTOLIC BLOOD PRESSURE: 127 MMHG

## 2021-02-23 DIAGNOSIS — Z89.421 ABSENCE OF TOE OF RIGHT FOOT (HCC): ICD-10-CM

## 2021-02-23 DIAGNOSIS — IMO0002 TYPE 2 DIABETES MELLITUS, UNCONTROLLED, WITH NEUROPATHY: Primary | ICD-10-CM

## 2021-02-23 DIAGNOSIS — Z89.512 ABSENCE OF LEFT LOWER LEG BELOW KNEE (HCC): ICD-10-CM

## 2021-02-23 PROCEDURE — 99213 OFFICE O/P EST LOW 20 MIN: CPT | Performed by: PODIATRIST

## 2021-02-23 PROCEDURE — 3008F BODY MASS INDEX DOCD: CPT | Performed by: PODIATRIST

## 2021-02-23 NOTE — PROGRESS NOTES
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 (HCC)     3  Absence of toe of right foot [facility])            PLAN  Patient was counseled on the condition and diagnosis  Educated disease prevention and risks related to diabetes  Educated proper daily foot care and exam   Instructed proper skin care / protection and footwear  Instructed to identify any signs of infection and related foot problem  Discussed proper blood glucose control with diet and exercise  The patient will return in 12 weeks for periodic diabetic foot exam       HISTORY OF PRESENT ILLNESS  Patient presents for diabetic foot exam   He has high risk diabetic foot with history of right TMA, left BKA, and multiple ulcers / infection  No ulcer on right foot  No redness or edema  He presents with DM shoes  His BS is under control  PAST MEDICAL HISTORY:  Past Medical History:   Diagnosis Date    Diabetes mellitus ([facility] )     Hypertension     Neuropathy in diabetes ([place] [address] )        PAST SURGICAL HISTORY:  Past Surgical History:   Procedure Laterality Date    AMPUTATION      FOOT AMPUTATION Right 9/13/2020    Procedure: AMPUTATION FOOT, removal 5th metatarsal;  Surgeon: Ede Cheema DPM;  [facility];  Service: Podiatry    FOOT SURGERY      [place] INCIS ACHILLES TENDON+LOCAL ANESTH Right 9/13/2020    Procedure: TENOTOMY PERCUTANEOUS ACHILLES;  Surgeon: Ede Cheema DPM;  [facility];  Service: Podiatry    WOUND DEBRIDEMENT  9/13/2020    Procedure: DEBRIDEMENT FOOT/TOE [facility]); Surgeon: Ede Cheema DPM;  [facility];  Service: Podiatry        ALLERGIES:  Patient has no known allergies      MEDICATIONS:  Current Outpatient Medications   Medication Sig Dispense Refill    aspirin (ECOTRIN) 325 mg EC tablet TAKE 1 TABLET BY MOUTH EVERY DAY 30 tablet 0    atorvastatin (LIPITOR) 20 mg tablet Take 20 mg by mouth daily      gabapentin (NEURONTIN) 300 mg capsule TAKE 1 CAPSULE BY MOUTH THREE TIMES A DAY 90 capsule 0    lisinopril (ZESTRIL) 20 mg tablet TAKE 1 TABLET BY MOUTH EVERY DAY IN THE AM      lisinopril (ZESTRIL) 30 mg tablet TAKE 1 TABLET (30 MG TOTAL) BY MOUTH DAILY  IN THE AM      metFORMIN (GLUCOPHAGE) 500 mg tablet Take 500 mg by mouth daily      sodium hypochlorite 0 25 percent APPLY 1 APPLICATION TOPICALLY ONCE FOR 1 DOSE       No current facility-administered medications for this visit  SOCIAL HISTORY:  Social History     Socioeconomic History    Marital status: Single     Spouse name: None    Number of children: None    Years of education: None    Highest education level: None   Occupational History    None   Social Needs    Financial resource strain: None    Food insecurity     Worry: None     Inability: None    Transportation needs     Medical: None     Non-medical: None   Tobacco Use    Smoking status: Current Some Day Smoker     Types: Pipe    Smokeless tobacco: Never Used   Substance and Sexual Activity    Alcohol use:  Yes     Alcohol/week: 1 0 standard drinks     Types: 1 Cans of beer per week     Frequency: 4 or more times a week     Drinks per session: 1 or 2    Drug use: Never    Sexual activity: None   Lifestyle    Physical activity     Days per week: None     Minutes per session: None    Stress: None   Relationships    Social connections     Talks on phone: None     Gets together: None     Attends Faith service: None     Active member of club or organization: None     Attends meetings of clubs or organizations: None     Relationship status: None    Intimate partner violence     Fear of current or ex partner: None     Emotionally abused: None     Physically abused: None     Forced sexual activity: None   Other Topics Concern    None   Social History Narrative    None        REVIEW OF SYSTEMS  Patient denied CP, SOB, fever, chills, palpitation, HA, GI problem, or calf pain  PHYSICAL EXAMINATION  GENERAL  The patient appears in NAD / non-toxic  Afebrile  VSS    VASCULAR EXAM  Pedal pulses and vascular status are intact  No calf pain or edema bilaterally  No cyanosis  CRT WNL  DERMATOLOGIC EXAM  No wound presents  No redness or edema  No cellulitis  Mild dry skin  NEUROLOGIC EXAM  AAO X 3  No focal neurologic deficit  Neurologic status is intact BLE  Protective sensation is absent right foot  MUSCULOSKELETAL EXAM  ROM intact  No fluctuation or crepitus  Good right foot dorsiflexion  TMA right foot  BKA left  Diabetic Foot Exam    Patient's shoes and socks removed  Right Foot/Ankle   Right Foot Inspection  Skin Exam: skin intact and dry skin no warmth, no erythema, no maceration, no pre-ulcer and no ulcer                        Amputation: amputation right foot (Comments: TMA)    Sensory   Vibration: absent  Proprioception: intact   Monofilament testing: absent  Vascular  Capillary refills: < 3 seconds  The right DP pulse is 2+  The right PT pulse is 2+       Left Foot/Ankle  Left Foot Inspection  Skin Exam: skin intact and dry skinno warmth, no erythema, no maceration, no pre-ulcer and no ulcer                       Amputation: amputation left foot (Comments: BKA)                      Assign Risk Category:  ; Loss of protective sensation; Weak pulses       Risk: 3

## 2021-03-10 DIAGNOSIS — Z23 ENCOUNTER FOR IMMUNIZATION: ICD-10-CM

## 2021-03-17 DIAGNOSIS — IMO0002 TYPE 2 DIABETES MELLITUS, UNCONTROLLED, WITH NEUROPATHY: ICD-10-CM

## 2021-03-17 RX ORDER — GABAPENTIN 300 MG/1
CAPSULE ORAL
Qty: 90 CAPSULE | Refills: 0 | Status: SHIPPED | OUTPATIENT
Start: 2021-03-17 | End: 2021-05-11

## 2021-03-28 ENCOUNTER — IMMUNIZATIONS (OUTPATIENT)
Dept: FAMILY MEDICINE CLINIC | Facility: HOSPITAL | Age: 66
End: 2021-03-28

## 2021-03-28 DIAGNOSIS — Z23 ENCOUNTER FOR IMMUNIZATION: Primary | ICD-10-CM

## 2021-03-28 PROCEDURE — 0001A SARS-COV-2 / COVID-19 MRNA VACCINE (PFIZER-BIONTECH) 30 MCG: CPT

## 2021-03-28 PROCEDURE — 91300 SARS-COV-2 / COVID-19 MRNA VACCINE (PFIZER-BIONTECH) 30 MCG: CPT

## 2021-04-08 ENCOUNTER — OFFICE VISIT (OUTPATIENT)
Dept: PODIATRY | Facility: CLINIC | Age: 66
End: 2021-04-08
Payer: COMMERCIAL

## 2021-04-08 VITALS — BODY MASS INDEX: 32.03 KG/M2 | HEIGHT: 64 IN

## 2021-04-08 DIAGNOSIS — Z89.421 ABSENCE OF TOE OF RIGHT FOOT (HCC): ICD-10-CM

## 2021-04-08 DIAGNOSIS — L97.419 HEEL ULCERATION, RIGHT, WITH UNSPECIFIED SEVERITY (HCC): Primary | ICD-10-CM

## 2021-04-08 DIAGNOSIS — IMO0002 TYPE 2 DIABETES MELLITUS, UNCONTROLLED, WITH NEUROPATHY: ICD-10-CM

## 2021-04-08 DIAGNOSIS — Z89.512 ABSENCE OF LEFT LOWER LEG BELOW KNEE (HCC): ICD-10-CM

## 2021-04-08 PROCEDURE — 99213 OFFICE O/P EST LOW 20 MIN: CPT | Performed by: PODIATRIST

## 2021-04-08 NOTE — PROGRESS NOTES
PATIENT:  Jemima Almanzar      1955    ASSESSMENT     1  Heel ulceration, right, with unspecified severity ([facility] [address] )     2  Type 2 diabetes mellitus, uncontrolled, with neuropathy ([facility] )     3  Absence of left lower leg below knee ([facility])     4  Absence of toe of right foot ([facility] )            PLAN  1  Patient was counseled on the condition and diagnosis  Educated disease prevention and risks related to diabetes  2  He has new wound on right heel  It is stable  Start silver alginate  3  Discussed proper off-loading  CAM walker for off-loading when ambulating  4  Reviewed recent blood work  HbA1c was 5 8  Continue tight control of BS  5  RA in 2 weeks  Call if there is any worsening or signs of infection  HISTORY OF PRESENT ILLNESS  Patient presents for new ulcer on right heel  He noticed it 2 days ago with bleeding  No pain  No redness or edema  He presents with DM shoes  His BS is under control  PAST MEDICAL HISTORY:  Past Medical History:   Diagnosis Date    Diabetes mellitus ([facility] )     Hypertension     Neuropathy in diabetes ([place] [address] )        PAST SURGICAL HISTORY:  Past Surgical History:   Procedure Laterality Date    AMPUTATION      FOOT AMPUTATION Right 9/13/2020    Procedure: AMPUTATION FOOT, removal 5th metatarsal;  Surgeon: Lenin Blanco DPM;  [facility];  Service: Podiatry    FOOT SURGERY      [place] INCIS ACHILLES TENDON+LOCAL ANESTH Right 9/13/2020    Procedure: TENOTOMY PERCUTANEOUS ACHILLES;  Surgeon: Lenin Blanco DPM;  [facility];  Service: Podiatry    WOUND DEBRIDEMENT  9/13/2020    Procedure: DEBRIDEMENT FOOT/TOE [facility]); Surgeon: Lenin Blanco DPM;  [facility];  Service: Podiatry        ALLERGIES:  Patient has no known allergies      MEDICATIONS:  Current Outpatient Medications   Medication Sig Dispense Refill    aspirin (ECOTRIN) 325 mg EC tablet TAKE 1 TABLET BY MOUTH EVERY DAY 30 tablet 0    atorvastatin (LIPITOR) 20 mg tablet Take 20 mg by mouth daily      gabapentin (NEURONTIN) 300 mg capsule TAKE 1 CAPSULE BY MOUTH THREE TIMES A DAY 90 capsule 0    lisinopril (ZESTRIL) 30 mg tablet TAKE 1 TABLET (30 MG TOTAL) BY MOUTH DAILY  IN THE AM      metFORMIN (GLUCOPHAGE) 500 mg tablet Take 500 mg by mouth daily      sodium hypochlorite 0 25 percent APPLY 1 APPLICATION TOPICALLY ONCE FOR 1 DOSE      lisinopril (ZESTRIL) 20 mg tablet TAKE 1 TABLET BY MOUTH EVERY DAY IN THE AM       No current facility-administered medications for this visit  SOCIAL HISTORY:  Social History     Socioeconomic History    Marital status: Registered Domestic Partner     Spouse name: None    Number of children: None    Years of education: None    Highest education level: None   Occupational History    None   Social Needs    Financial resource strain: None    Food insecurity     Worry: None     Inability: None    Transportation needs     Medical: None     Non-medical: None   Tobacco Use    Smoking status: Current Some Day Smoker     Types: Pipe    Smokeless tobacco: Never Used   Substance and Sexual Activity    Alcohol use:  Yes     Alcohol/week: 1 0 standard drinks     Types: 1 Cans of beer per week     Frequency: 4 or more times a week     Drinks per session: 1 or 2    Drug use: Never    Sexual activity: None   Lifestyle    Physical activity     Days per week: None     Minutes per session: None    Stress: None   Relationships    Social connections     Talks on phone: None     Gets together: None     Attends Judaism service: None     Active member of club or organization: None     Attends meetings of clubs or organizations: None     Relationship status: None    Intimate partner violence     Fear of current or ex partner: None     Emotionally abused: None     Physically abused: None     Forced sexual activity: None   Other Topics Concern    None   Social History Narrative    None        REVIEW OF SYSTEMS  Patient denied CP, SOB, fever, chills, palpitation, HA, GI problem, or calf pain  PHYSICAL EXAMINATION  GENERAL  The patient appears in NAD / non-toxic  Afebrile  VSS    VASCULAR EXAM  Pedal pulses and vascular status are intact  No calf pain or edema bilaterally  No cyanosis  CRT WNL  DERMATOLOGIC EXAM  Ulcer on right posterior heel  0 7 X 0 5 X 0 1 cm  No deep probing  Wound bed is granular  No redness or edema  No cellulitis  NEUROLOGIC EXAM  AAO X 3  No focal neurologic deficit  Neurologic status is intact BLE  Protective sensation is absent right foot  MUSCULOSKELETAL EXAM  ROM intact  No fluctuation or crepitus  Normal right foot dorsiflexion  TMA right foot  BKA left

## 2021-04-18 ENCOUNTER — IMMUNIZATIONS (OUTPATIENT)
Dept: FAMILY MEDICINE CLINIC | Facility: HOSPITAL | Age: 66
End: 2021-04-18

## 2021-04-18 DIAGNOSIS — Z23 ENCOUNTER FOR IMMUNIZATION: Primary | ICD-10-CM

## 2021-04-18 PROCEDURE — 0002A SARS-COV-2 / COVID-19 MRNA VACCINE (PFIZER-BIONTECH) 30 MCG: CPT

## 2021-04-18 PROCEDURE — 91300 SARS-COV-2 / COVID-19 MRNA VACCINE (PFIZER-BIONTECH) 30 MCG: CPT

## 2021-04-22 ENCOUNTER — OFFICE VISIT (OUTPATIENT)
Dept: PODIATRY | Facility: CLINIC | Age: 66
End: 2021-04-22
Payer: COMMERCIAL

## 2021-04-22 VITALS
HEART RATE: 71 BPM | HEIGHT: 64 IN | BODY MASS INDEX: 31.76 KG/M2 | WEIGHT: 186 LBS | DIASTOLIC BLOOD PRESSURE: 69 MMHG | SYSTOLIC BLOOD PRESSURE: 159 MMHG

## 2021-04-22 DIAGNOSIS — L97.419 HEEL ULCERATION, RIGHT, WITH UNSPECIFIED SEVERITY (HCC): Primary | ICD-10-CM

## 2021-04-22 DIAGNOSIS — Z89.512 ABSENCE OF LEFT LOWER LEG BELOW KNEE (HCC): ICD-10-CM

## 2021-04-22 DIAGNOSIS — IMO0002 TYPE 2 DIABETES MELLITUS, UNCONTROLLED, WITH NEUROPATHY: ICD-10-CM

## 2021-04-22 DIAGNOSIS — Z89.421 ABSENCE OF TOE OF RIGHT FOOT (HCC): ICD-10-CM

## 2021-04-22 PROCEDURE — 99213 OFFICE O/P EST LOW 20 MIN: CPT | Performed by: PODIATRIST

## 2021-04-22 PROCEDURE — 3008F BODY MASS INDEX DOCD: CPT | Performed by: PODIATRIST

## 2021-04-22 RX ORDER — LANCETS 28 GAUGE
EACH MISCELLANEOUS
COMMUNITY
Start: 2021-03-17

## 2021-04-22 RX ORDER — BLOOD SUGAR DIAGNOSTIC
STRIP MISCELLANEOUS
COMMUNITY
Start: 2021-03-03

## 2021-04-22 RX ORDER — BLOOD-GLUCOSE METER
KIT MISCELLANEOUS
COMMUNITY
Start: 2021-03-17

## 2021-04-22 RX ORDER — NIFEDIPINE 30 MG/1
30 TABLET, EXTENDED RELEASE ORAL DAILY
COMMUNITY
Start: 2021-04-09 | End: 2022-04-09

## 2021-04-22 NOTE — PROGRESS NOTES
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)     4  Absence of toe of right foot ([facility] )            PLAN  1  Patient was counseled on the condition and diagnosis  Educated disease prevention and risks related to diabetes  2  Wound looks healed with superficial eschar  Instructed skin care and protection  3  Discussed proper off-loading  CAM walker for off-loading when ambulating for 1 week  Then, advance to DM shoes  4  He will return in 6 weeks  HISTORY OF PRESENT ILLNESS  Patient presents for ulcer on right heel  No drainage  No pain  No redness or edema  He presents with CAM walker  BS is under control  PAST MEDICAL HISTORY:  Past Medical History:   Diagnosis Date    Diabetes mellitus ([facility] )     Hypertension     Neuropathy in diabetes ([place] [address] )        PAST SURGICAL HISTORY:  Past Surgical History:   Procedure Laterality Date    AMPUTATION      FOOT AMPUTATION Right 9/13/2020    Procedure: AMPUTATION FOOT, removal 5th metatarsal;  Surgeon: Brenda Crespo DPM;  [facility];  Service: Podiatry    FOOT SURGERY      [place] INCIS ACHILLES TENDON+LOCAL ANESTH Right 9/13/2020    Procedure: TENOTOMY PERCUTANEOUS ACHILLES;  Surgeon: Brenda Crespo DPM;  [facility];  Service: Podiatry    WOUND DEBRIDEMENT  9/13/2020    Procedure: DEBRIDEMENT FOOT/TOE [facility]); Surgeon: Brenda Crespo DPM;  [facility];  Service: Podiatry        ALLERGIES:  Patient has no known allergies      MEDICATIONS:  Current Outpatient Medications   Medication Sig Dispense Refill    aspirin (ECOTRIN) 325 mg EC tablet TAKE 1 TABLET BY MOUTH EVERY DAY 30 tablet 0    atorvastatin (LIPITOR) 20 mg tablet Take 20 mg by mouth daily      Blood Glucose Monitoring Suppl (FreeStyle Lite) JOSE A       Contour Test test strip       gabapentin (NEURONTIN) 300 mg capsule TAKE 1 CAPSULE BY MOUTH THREE TIMES A DAY 90 capsule 0    Lancets (freestyle) lancets       lisinopril (ZESTRIL) 30 mg tablet TAKE 1 TABLET (30 MG TOTAL) BY MOUTH DAILY  IN THE AM      metFORMIN (GLUCOPHAGE) 500 mg tablet Take 500 mg by mouth daily      NIFEdipine (PROCARDIA XL) 30 mg 24 hr tablet Take 30 mg by mouth daily      sodium hypochlorite 0 25 percent APPLY 1 APPLICATION TOPICALLY ONCE FOR 1 DOSE      lisinopril (ZESTRIL) 20 mg tablet TAKE 1 TABLET BY MOUTH EVERY DAY IN THE AM       No current facility-administered medications for this visit  SOCIAL HISTORY:  Social History     Socioeconomic History    Marital status: Registered Domestic Partner     Spouse name: None    Number of children: None    Years of education: None    Highest education level: None   Occupational History    None   Social Needs    Financial resource strain: None    Food insecurity     Worry: None     Inability: None    Transportation needs     Medical: None     Non-medical: None   Tobacco Use    Smoking status: Current Some Day Smoker     Types: Pipe    Smokeless tobacco: Never Used   Substance and Sexual Activity    Alcohol use:  Yes     Alcohol/week: 1 0 standard drinks     Types: 1 Cans of beer per week     Frequency: 4 or more times a week     Drinks per session: 1 or 2    Drug use: Never    Sexual activity: None   Lifestyle    Physical activity     Days per week: None     Minutes per session: None    Stress: None   Relationships    Social connections     Talks on phone: None     Gets together: None     Attends Worship service: None     Active member of club or organization: None     Attends meetings of clubs or organizations: None     Relationship status: None    Intimate partner violence     Fear of current or ex partner: None     Emotionally abused: None     Physically abused: None     Forced sexual activity: None   Other Topics Concern    None   Social History Narrative    None        REVIEW OF SYSTEMS  Patient denied CP, SOB, fever, chills, palpitation, HA, GI problem, or calf pain  PHYSICAL EXAMINATION  GENERAL  The patient appears in NAD / non-toxic  Afebrile  VSS    VASCULAR EXAM  Pedal pulses and vascular status are intact  No calf pain or edema bilaterally  No cyanosis  CRT WNL  DERMATOLOGIC EXAM  Ulcer on right posterior heel healed with superficial eschar  No drainage  No redness or edema  No cellulitis  No abscess  NEUROLOGIC EXAM  AAO X 3  No focal neurologic deficit  Neurologic status is intact BLE  Protective sensation is absent right foot  MUSCULOSKELETAL EXAM  ROM intact  No fluctuation or crepitus  Normal right foot dorsiflexion  TMA right foot  BKA left

## 2021-05-04 ENCOUNTER — TELEPHONE (OUTPATIENT)
Dept: OBGYN CLINIC | Facility: HOSPITAL | Age: 66
End: 2021-05-04

## 2021-05-04 ENCOUNTER — TELEPHONE (OUTPATIENT)
Dept: PODIATRY | Facility: CLINIC | Age: 66
End: 2021-05-04

## 2021-05-04 NOTE — TELEPHONE ENCOUNTER
Patient sees Dr Rj Olivas  Patient is calling in stating that the wound once he took off the dressing pad had blood and a white discharge  The patient was still wearing the boot and did not transition to the normal shoe, he is asking what further he should do for this? He is asking for a call back relating this            Call back# [phone]

## 2021-05-04 NOTE — TELEPHONE ENCOUNTER
Called pt and stated that they needed to come in for an earlier appointment per Dr Estee Reynolds  Pt stated they are having some blood and white discharge coming out of the wound  Pt was advised to put some Alginate and dry dressing in the meantime

## 2021-05-06 ENCOUNTER — OFFICE VISIT (OUTPATIENT)
Dept: PODIATRY | Facility: CLINIC | Age: 66
End: 2021-05-06
Payer: COMMERCIAL

## 2021-05-06 VITALS
SYSTOLIC BLOOD PRESSURE: 114 MMHG | HEIGHT: 64 IN | WEIGHT: 190 LBS | BODY MASS INDEX: 32.44 KG/M2 | DIASTOLIC BLOOD PRESSURE: 68 MMHG

## 2021-05-06 DIAGNOSIS — IMO0002 TYPE 2 DIABETES MELLITUS, UNCONTROLLED, WITH NEUROPATHY: ICD-10-CM

## 2021-05-06 DIAGNOSIS — Z89.512 ABSENCE OF LEFT LOWER LEG BELOW KNEE (HCC): ICD-10-CM

## 2021-05-06 DIAGNOSIS — L97.419 HEEL ULCERATION, RIGHT, WITH UNSPECIFIED SEVERITY (HCC): Primary | ICD-10-CM

## 2021-05-06 PROCEDURE — 99213 OFFICE O/P EST LOW 20 MIN: CPT | Performed by: PODIATRIST

## 2021-05-06 NOTE — PROGRESS NOTES
PATIENT:  Anshul Jade      1955    ASSESSMENT     1  Heel ulceration, right, with unspecified severity ([facility] [address] )     2  Type 2 diabetes mellitus, uncontrolled, with neuropathy (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)            PLAN  1  Patient was counseled on the condition and diagnosis  Educated disease prevention and risks related to diabetes  2  Reviewed previous office note  Wound reopened right heel  Wound bed is dry  Start moist dressing  [place] honey daily  3  Discussed proper off-loading  CAM walker for off-loading  4  He will return in 2 weeks  HISTORY OF PRESENT ILLNESS  Patient presents for recurring ulcer on right heel  He noticed some drainage from right heel  Then, it stopped  No redness or edema  He presents with CAM walker  BS is under control  PAST MEDICAL HISTORY:  Past Medical History:   Diagnosis Date    Diabetes mellitus ([facility] )     Hypertension     Neuropathy in diabetes ([facility] )        PAST SURGICAL HISTORY:  Past Surgical History:   Procedure Laterality Date    AMPUTATION      FOOT AMPUTATION Right 9/13/2020    Procedure: AMPUTATION FOOT, removal 5th metatarsal;  Surgeon: Candie Cash DPM;  [facility];  Service: Podiatry    FOOT SURGERY      UT INCIS ACHILLES TENDON+LOCAL ANESTH Right 9/13/2020    Procedure: TENOTOMY PERCUTANEOUS ACHILLES;  Surgeon: Candie Cash DPM;  [facility];  Service: Podiatry    WOUND DEBRIDEMENT  9/13/2020    Procedure: DEBRIDEMENT FOOT/TOE [facility]); Surgeon: Candie Cash DPM;  [facility];  Service: Podiatry        ALLERGIES:  Patient has no known allergies      MEDICATIONS:  Current Outpatient Medications   Medication Sig Dispense Refill    aspirin (ECOTRIN) 325 mg EC tablet TAKE 1 TABLET BY MOUTH EVERY DAY 30 tablet 0    atorvastatin (LIPITOR) 20 mg tablet Take 20 mg by mouth daily      Blood Glucose Monitoring Suppl (FreeStyle Lite) JOSE A       Contour Test test strip       gabapentin (NEURONTIN) 300 mg capsule TAKE 1 CAPSULE BY MOUTH THREE TIMES A DAY 90 capsule 0    Lancets (freestyle) lancets       lisinopril (ZESTRIL) 20 mg tablet TAKE 1 TABLET BY MOUTH EVERY DAY IN THE AM      lisinopril (ZESTRIL) 30 mg tablet TAKE 1 TABLET (30 MG TOTAL) BY MOUTH DAILY  IN THE AM      metFORMIN (GLUCOPHAGE) 500 mg tablet Take 500 mg by mouth daily      NIFEdipine (PROCARDIA XL) 30 mg 24 hr tablet Take 30 mg by mouth daily      sodium hypochlorite 0 25 percent APPLY 1 APPLICATION TOPICALLY ONCE FOR 1 DOSE       No current facility-administered medications for this visit  SOCIAL HISTORY:  Social History     Socioeconomic History    Marital status: Registered Domestic Partner     Spouse name: None    Number of children: None    Years of education: None    Highest education level: None   Occupational History    None   Social Needs    Financial resource strain: None    Food insecurity     Worry: None     Inability: None    Transportation needs     Medical: None     Non-medical: None   Tobacco Use    Smoking status: Current Some Day Smoker     Types: Pipe    Smokeless tobacco: Never Used   Substance and Sexual Activity    Alcohol use:  Yes     Alcohol/week: 1 0 standard drinks     Types: 1 Cans of beer per week     Frequency: 4 or more times a week     Drinks per session: 1 or 2    Drug use: Never    Sexual activity: None   Lifestyle    Physical activity     Days per week: None     Minutes per session: None    Stress: None   Relationships    Social connections     Talks on phone: None     Gets together: None     Attends Restoration service: None     Active member of club or organization: None     Attends meetings of clubs or organizations: None     Relationship status: None    Intimate partner violence     Fear of current or ex partner: None     Emotionally abused: None     Physically abused: None     Forced sexual activity: None   Other Topics Concern    None   Social History Narrative    None        REVIEW OF SYSTEMS  Patient denied CP, SOB, fever, chills, palpitation, HA, GI problem, or calf pain  PHYSICAL EXAMINATION  GENERAL  The patient appears in NAD / non-toxic  Afebrile  VSS    VASCULAR EXAM  Pedal pulses and vascular status are intact  No calf pain or edema  No cyanosis  CRT WNL  DERMATOLOGIC EXAM  Ulcer on right posterior heel with dry skin  Wound bed is dry, but seeing epithelial tissues  No deep probing  No sinus tract  No redness or edema  No cellulitis  No abscess  NEUROLOGIC EXAM  AAO X 3  No focal neurologic deficit  Neurologic status is intact BLE  Protective sensation is absent right foot  MUSCULOSKELETAL EXAM  ROM intact  No fluctuation or crepitus  TMA right foot  BKA left

## 2021-05-11 DIAGNOSIS — IMO0002 TYPE 2 DIABETES MELLITUS, UNCONTROLLED, WITH NEUROPATHY: ICD-10-CM

## 2021-05-11 RX ORDER — GABAPENTIN 300 MG/1
CAPSULE ORAL
Qty: 90 CAPSULE | Refills: 0 | Status: SHIPPED | OUTPATIENT
Start: 2021-05-11 | End: 2021-06-21

## 2021-05-21 ENCOUNTER — PROCEDURE VISIT (OUTPATIENT)
Dept: PODIATRY | Facility: CLINIC | Age: 66
End: 2021-05-21
Payer: COMMERCIAL

## 2021-05-21 VITALS
HEART RATE: 61 BPM | WEIGHT: 192.4 LBS | DIASTOLIC BLOOD PRESSURE: 75 MMHG | BODY MASS INDEX: 32.85 KG/M2 | HEIGHT: 64 IN | SYSTOLIC BLOOD PRESSURE: 124 MMHG

## 2021-05-21 DIAGNOSIS — Z89.512 ABSENCE OF LEFT LOWER LEG BELOW KNEE (HCC): ICD-10-CM

## 2021-05-21 DIAGNOSIS — L97.419 HEEL ULCERATION, RIGHT, WITH UNSPECIFIED SEVERITY (HCC): Primary | ICD-10-CM

## 2021-05-21 DIAGNOSIS — IMO0002 TYPE 2 DIABETES MELLITUS, UNCONTROLLED, WITH NEUROPATHY: ICD-10-CM

## 2021-05-21 PROCEDURE — 3008F BODY MASS INDEX DOCD: CPT | Performed by: PODIATRIST

## 2021-05-21 PROCEDURE — 99213 OFFICE O/P EST LOW 20 MIN: CPT | Performed by: PODIATRIST

## 2021-05-21 NOTE — PROGRESS NOTES
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)            PLAN  1  Patient was counseled on the condition and diagnosis  Educated disease prevention and risks related to diabetes  2  Reviewed previous office note  Wound is epithelized  Mepilex border was applied  Instructed protective dressing  CAM walker for 2 more weeks  3  Instructed him to monitor for any worsening  4  He will return in 6 weeks  HISTORY OF PRESENT ILLNESS  Patient presents for recurring ulcer on right heel  No drainage  No redness or edema  No pain right heel  He presents with CAM walker  BS is under control  PAST MEDICAL HISTORY:  Past Medical History:   Diagnosis Date    Diabetes mellitus ([facility] [address] )     Hypertension     Neuropathy in diabetes ([facility] )        PAST SURGICAL HISTORY:  Past Surgical History:   Procedure Laterality Date    AMPUTATION      FOOT AMPUTATION Right 9/13/2020    Procedure: AMPUTATION FOOT, removal 5th metatarsal;  Surgeon: Solange Salas DPM;  [facility];  Service: Podiatry    FOOT SURGERY      AL INCIS ACHILLES TENDON+LOCAL ANESTH Right 9/13/2020    Procedure: TENOTOMY PERCUTANEOUS ACHILLES;  Surgeon: Solange Salas DPM;  [facility];  Service: Podiatry    WOUND DEBRIDEMENT  9/13/2020    Procedure: DEBRIDEMENT FOOT/TOE [facility]); Surgeon: Solange Salas DPM;  [facility];  Service: Podiatry        ALLERGIES:  Patient has no known allergies      MEDICATIONS:  Current Outpatient Medications   Medication Sig Dispense Refill    aspirin (ECOTRIN) 325 mg EC tablet TAKE 1 TABLET BY MOUTH EVERY DAY 30 tablet 0    atorvastatin (LIPITOR) 20 mg tablet Take 20 mg by mouth daily      Blood Glucose Monitoring Suppl (FreeStyle Lite) JOSE A       Contour Test test strip       gabapentin (NEURONTIN) 300 mg capsule TAKE 1 CAPSULE BY MOUTH THREE TIMES A DAY 90 capsule 0    Lancets (freestyle) lancets       lisinopril (ZESTRIL) 30 mg tablet TAKE 1 TABLET (30 MG TOTAL) BY MOUTH DAILY  IN THE AM      metFORMIN (GLUCOPHAGE) 500 mg tablet Take 500 mg by mouth daily      NIFEdipine (PROCARDIA XL) 30 mg 24 hr tablet Take 30 mg by mouth daily      sodium hypochlorite 0 25 percent APPLY 1 APPLICATION TOPICALLY ONCE FOR 1 DOSE       No current facility-administered medications for this visit  SOCIAL HISTORY:  Social History     Socioeconomic History    Marital status: Registered Domestic Partner     Spouse name: None    Number of children: None    Years of education: None    Highest education level: None   Occupational History    None   Social Needs    Financial resource strain: None    Food insecurity     Worry: None     Inability: None    Transportation needs     Medical: None     Non-medical: None   Tobacco Use    Smoking status: Current Some Day Smoker     Types: Pipe    Smokeless tobacco: Never Used   Substance and Sexual Activity    Alcohol use:  Yes     Alcohol/week: 1 0 standard drinks     Types: 1 Cans of beer per week     Frequency: 4 or more times a week     Drinks per session: 1 or 2    Drug use: Never    Sexual activity: None   Lifestyle    Physical activity     Days per week: None     Minutes per session: None    Stress: None   Relationships    Social connections     Talks on phone: None     Gets together: None     Attends Buddhist service: None     Active member of club or organization: None     Attends meetings of clubs or organizations: None     Relationship status: None    Intimate partner violence     Fear of current or ex partner: None     Emotionally abused: None     Physically abused: None     Forced sexual activity: None   Other Topics Concern    None   Social History Narrative    None        REVIEW OF SYSTEMS  Patient denied CP, SOB, fever, chills, palpitation, HA, GI problem, or calf pain  PHYSICAL EXAMINATION  GENERAL  The patient appears in NAD / non-toxic  Afebrile  VSS    VASCULAR EXAM  Pedal pulses and vascular status are intact  No calf pain or edema  No cyanosis  CRT WNL  DERMATOLOGIC EXAM  Ulcer on right posterior heel looks epithelized under the callus  No drainage  No redness or edema  No cellulitis  No abscess  NEUROLOGIC EXAM  AAO X 3  No focal neurologic deficit  Neurologic status is intact BLE  Protective sensation is absent right foot  MUSCULOSKELETAL EXAM  ROM intact  No fluctuation or crepitus  TMA right foot  BKA left

## 2021-06-14 DIAGNOSIS — IMO0002 TYPE 2 DIABETES MELLITUS, UNCONTROLLED, WITH NEUROPATHY: ICD-10-CM

## 2021-06-21 RX ORDER — GABAPENTIN 300 MG/1
CAPSULE ORAL
Qty: 90 CAPSULE | Refills: 0 | Status: SHIPPED | OUTPATIENT
Start: 2021-06-21 | End: 2021-11-09

## 2021-11-04 ENCOUNTER — OFFICE VISIT (OUTPATIENT)
Dept: PODIATRY | Facility: CLINIC | Age: 66
End: 2021-11-04
Payer: COMMERCIAL

## 2021-11-04 VITALS
BODY MASS INDEX: 31.92 KG/M2 | SYSTOLIC BLOOD PRESSURE: 145 MMHG | WEIGHT: 187 LBS | HEART RATE: 67 BPM | DIASTOLIC BLOOD PRESSURE: 78 MMHG | HEIGHT: 64 IN

## 2021-11-04 DIAGNOSIS — IMO0002 TYPE 2 DIABETES MELLITUS, UNCONTROLLED, WITH NEUROPATHY: Primary | ICD-10-CM

## 2021-11-04 DIAGNOSIS — Z89.421 ABSENCE OF TOE OF RIGHT FOOT (HCC): ICD-10-CM

## 2021-11-04 DIAGNOSIS — Z89.512 ABSENCE OF LEFT LOWER LEG BELOW KNEE (HCC): ICD-10-CM

## 2021-11-04 PROCEDURE — 99213 OFFICE O/P EST LOW 20 MIN: CPT | Performed by: PODIATRIST

## 2021-11-04 RX ORDER — CEPHALEXIN 250 MG/1
CAPSULE ORAL
COMMUNITY
Start: 2021-10-30

## 2021-11-04 RX ORDER — SAW/PYGEUM/BETA/HERB/D3/B6/ZN 30 MG-25MG
10 CAPSULE ORAL
COMMUNITY
Start: 2021-06-11

## 2021-11-09 DIAGNOSIS — IMO0002 TYPE 2 DIABETES MELLITUS, UNCONTROLLED, WITH NEUROPATHY: ICD-10-CM

## 2021-11-09 RX ORDER — GABAPENTIN 300 MG/1
CAPSULE ORAL
Qty: 90 CAPSULE | Refills: 0 | Status: SHIPPED | OUTPATIENT
Start: 2021-11-09 | End: 2021-12-19 | Stop reason: SDUPTHER

## 2021-12-18 DIAGNOSIS — IMO0002 TYPE 2 DIABETES MELLITUS, UNCONTROLLED, WITH NEUROPATHY: ICD-10-CM

## 2021-12-19 RX ORDER — GABAPENTIN 300 MG/1
CAPSULE ORAL
Qty: 90 CAPSULE | Refills: 0 | Status: SHIPPED | OUTPATIENT
Start: 2021-12-19 | End: 2022-02-02

## 2022-02-02 DIAGNOSIS — IMO0002 TYPE 2 DIABETES MELLITUS, UNCONTROLLED, WITH NEUROPATHY: ICD-10-CM

## 2022-02-02 RX ORDER — GABAPENTIN 300 MG/1
CAPSULE ORAL
Qty: 90 CAPSULE | Refills: 0 | Status: SHIPPED | OUTPATIENT
Start: 2022-02-02 | End: 2022-03-18

## 2022-02-10 ENCOUNTER — OFFICE VISIT (OUTPATIENT)
Dept: PODIATRY | Facility: CLINIC | Age: 67
End: 2022-02-10
Payer: COMMERCIAL

## 2022-02-10 VITALS
BODY MASS INDEX: 31.92 KG/M2 | HEART RATE: 75 BPM | SYSTOLIC BLOOD PRESSURE: 128 MMHG | HEIGHT: 64 IN | WEIGHT: 187 LBS | DIASTOLIC BLOOD PRESSURE: 76 MMHG

## 2022-02-10 DIAGNOSIS — Z89.421 ABSENCE OF TOE OF RIGHT FOOT (HCC): ICD-10-CM

## 2022-02-10 DIAGNOSIS — Z89.512 ABSENCE OF LEFT LOWER LEG BELOW KNEE (HCC): ICD-10-CM

## 2022-02-10 DIAGNOSIS — IMO0002 TYPE 2 DIABETES MELLITUS, UNCONTROLLED, WITH NEUROPATHY: Primary | ICD-10-CM

## 2022-02-10 PROCEDURE — 99213 OFFICE O/P EST LOW 20 MIN: CPT | Performed by: PODIATRIST

## 2022-02-10 NOTE — PROGRESS NOTES
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)     3  Absence of toe of right foot ([address] )  Diabetic Shoe    Diabetic Shoe Inserts         PLAN:  1  The last office note was reviewed  Patient was counseled and educated on the condition and the diagnosis  He has high risk diabetic feet with history of TMA right, BKA left, and neuropathy  2  Educated disease prevention and risks related to diabetes  Educated proper daily foot care and exam   Instructed proper skin care / protection and footwear  Instructed to identify any signs of infection and related foot problem  3  Referred him for new DM shoes and inserts  4  Reviewed / discussed blood work  The last HbA1c was 6 4  Discussed proper BS control  5  Discussed options for neuropathic pain  Recommended pain management consultation  Pt to try TENS unit  6  He will return in 3 months  Subjective:     HPI  The patient presents for diabetic foot exam   He has no active ulcer in his foot  He has high risk diabetic foot with history of 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 and BKA on left side in 2012  BS under control  He has numbness in his foot  He presents with diabetic shoes  He complained of neurologic pain  He takes 2100 mg of Gabapentin a day, but pain can keep hip up at night  The history were reviewed and updated as appropriate: allergies, medications, past family history, past medical history, past social history, past surgical history and problem list  All pertinent labs and images were reviewed          Past Medical History  Past Medical History:   Diagnosis Date    Diabetes mellitus ([address] )     Hypertension     Neuropathy in diabetes [facility])        Past Surgical History  Past Surgical History:   Procedure Laterality Date    AMPUTATION      FOOT AMPUTATION Right 9/13/2020    Procedure: AMPUTATION FOOT, removal 5th metatarsal;  Surgeon: Crystal Byrne DPM;  [facility];  Service: Podiatry    FOOT SURGERY      [place] INCIS ACHILLES TENDON+LOCAL ANESTH Right 9/13/2020    Procedure: TENOTOMY PERCUTANEOUS ACHILLES;  Surgeon: Crystal Byrne DPM;  [facility];  Service: Podiatry    WOUND DEBRIDEMENT  9/13/2020    Procedure: DEBRIDEMENT FOOT/TOE [facility]); Surgeon: Crystal Byrne DPM;  [facility];  Service: Podiatry        Allergies: Other    Medications:  Current Outpatient Medications   Medication Sig Dispense Refill    aspirin (ECOTRIN) 325 mg EC tablet TAKE 1 TABLET BY MOUTH EVERY DAY 30 tablet 0    Blood Glucose Monitoring Suppl (FreeStyle Lite) JOSE A       cephalexin (KEFLEX) 250 mg capsule       Cholecalciferol 25 MCG (1000 UT) tablet Take 1,000 Units by mouth daily      Contour Test test strip       gabapentin (NEURONTIN) 300 mg capsule TAKE 1 CAPSULE BY MOUTH THREE TIMES A DAY (Patient taking differently: 600 mg  ) 90 capsule 0    Lancets (freestyle) lancets       lisinopril (ZESTRIL) 30 mg tablet 40 mg        Melatonin ER 10 MG TBCR Take 10 mg by mouth      metFORMIN (GLUCOPHAGE) 500 mg tablet Take 500 mg by mouth daily      NIFEdipine (PROCARDIA XL) 30 mg 24 hr tablet Take 30 mg by mouth daily      sodium hypochlorite 0 25 percent APPLY 1 APPLICATION TOPICALLY ONCE FOR 1 DOSE      atorvastatin (LIPITOR) 20 mg tablet Take 20 mg by mouth daily       No current facility-administered medications for this visit         Social History:  Social History     Socioeconomic History    Marital status: Registered Domestic Partner     Spouse name: None    Number of children: None    Years of education: None    Highest education level: None   Occupational History    None   Tobacco Use    Smoking status: Current Some Day Smoker     Types: Pipe    Smokeless tobacco: Never Used   Vaping Use    Vaping Use: Never used   Substance and Sexual Activity    Alcohol use: Yes     Alcohol/week: 1 0 standard drink     Types: 1 Cans of beer per week    Drug use: Never    Sexual activity: None   Other Topics Concern    None   Social History Narrative    None     Social Determinants of Health     Financial Resource Strain: Not on file   Food Insecurity: Not on file   Transportation Needs: Not on file   Physical Activity: Not on file   Stress: Not on file   Social Connections: Not on file   Intimate Partner Violence: Not on file   Housing Stability: Not on file          Review of Systems   Constitutional: Negative for chills and fever  Respiratory: Negative for cough and shortness of breath  Cardiovascular: Negative for chest pain  Gastrointestinal: Negative for diarrhea, nausea and vomiting  Genitourinary: Negative for hematuria  Musculoskeletal: Negative for joint swelling and myalgias  Neurological: Positive for numbness  Negative for weakness  Hematological: Negative  Objective:      /76   Pulse 75   Ht 5' 4" (1 626 m) Comment: verbal  Wt 84 8 kg (187 lb)   BMI 32 10 kg/m²          Physical Exam  Vitals reviewed  Constitutional:       General: He is not in acute distress  Appearance: He is well-developed  He is not toxic-appearing or diaphoretic  Cardiovascular:      Rate and Rhythm: Normal rate and regular rhythm  Pulses:           Dorsalis pedis pulses are 1+ on the right side  Posterior tibial pulses are 1+ on the right side  Pulmonary:      Effort: Pulmonary effort is normal  No respiratory distress  Musculoskeletal:         General: No tenderness or signs of injury  Left lower leg: Edema present  Right foot: No Charcot foot or foot drop  Comments: BKA left  TMA right  No acute joint inflammation or edema  Chronic right LE edema with venous stasis  No cellulitis RLE  Left Lower Extremity: Left leg is amputated below knee     Feet: Right foot:      Skin integrity: No ulcer, skin breakdown, erythema, warmth, callus or dry skin  Skin:     General: Skin is warm  Capillary Refill: Capillary refill takes less than 2 seconds  Coloration: Skin is not cyanotic or mottled  Findings: No abscess, erythema, rash or wound  Nails: There is no clubbing  Neurological:      General: No focal deficit present  Mental Status: He is alert and oriented to person, place, and time  Cranial Nerves: No cranial nerve deficit  Sensory: Sensory deficit present  Motor: No weakness  Coordination: Coordination normal    Psychiatric:         Mood and Affect: Mood normal          Behavior: Behavior normal          Thought Content:  Thought content normal          Judgment: Judgment normal

## 2022-03-18 DIAGNOSIS — IMO0002 TYPE 2 DIABETES MELLITUS, UNCONTROLLED, WITH NEUROPATHY: ICD-10-CM

## 2022-03-18 RX ORDER — GABAPENTIN 300 MG/1
CAPSULE ORAL
Qty: 90 CAPSULE | Refills: 0 | Status: SHIPPED | OUTPATIENT
Start: 2022-03-18 | End: 2022-04-13

## 2022-03-22 ENCOUNTER — TELEPHONE (OUTPATIENT)
Dept: PODIATRY | Facility: CLINIC | Age: 67
End: 2022-03-22

## 2022-04-13 DIAGNOSIS — E11.40 TYPE 2 DIABETES MELLITUS WITH DIABETIC NEUROPATHY, UNSPECIFIED (HCC): ICD-10-CM

## 2022-04-13 RX ORDER — GABAPENTIN 300 MG/1
CAPSULE ORAL
Qty: 90 CAPSULE | Refills: 0 | Status: SHIPPED | OUTPATIENT
Start: 2022-04-13

## 2022-06-30 ENCOUNTER — OFFICE VISIT (OUTPATIENT)
Dept: PODIATRY | Facility: CLINIC | Age: 67
End: 2022-06-30
Payer: COMMERCIAL

## 2022-06-30 VITALS
SYSTOLIC BLOOD PRESSURE: 120 MMHG | DIASTOLIC BLOOD PRESSURE: 60 MMHG | HEART RATE: 69 BPM | BODY MASS INDEX: 31.76 KG/M2 | HEIGHT: 64 IN | WEIGHT: 186 LBS

## 2022-06-30 DIAGNOSIS — E11.40 TYPE 2 DIABETES MELLITUS WITH DIABETIC NEUROPATHY, UNSPECIFIED WHETHER LONG TERM INSULIN USE (HCC): Primary | ICD-10-CM

## 2022-06-30 DIAGNOSIS — Z89.512 ABSENCE OF LEFT LOWER LEG BELOW KNEE (HCC): ICD-10-CM

## 2022-06-30 DIAGNOSIS — Z89.421 ABSENCE OF TOE OF RIGHT FOOT (HCC): ICD-10-CM

## 2022-06-30 PROCEDURE — 99213 OFFICE O/P EST LOW 20 MIN: CPT | Performed by: PODIATRIST

## 2022-06-30 NOTE — PROGRESS NOTES
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)     3  Absence of toe of right foot ([facility] [address] )           PLAN:  1  Reviewed medical records and labs  Patient was counseled and educated on the condition and the diagnosis  He has high risk diabetic feet with history of TMA right, BKA left, and neuropathy  2  Educated disease prevention and risks related to diabetes  Educated proper daily foot care and exam   Instructed proper skin care / protection and footwear  Instructed to identify any signs of infection and related foot problem  3  Continue DM shoes and inserts  4  He will return in 3 months  Subjective:     HPI  The patient presents for diabetic foot exam   He has no active ulcer in his right foot  He has high risk diabetic foot with history of 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 and BKA on left side in 2012  BS under control  He has numbness in his foot  He presents with diabetic shoes  The history were reviewed and updated as appropriate: allergies, medications, past family history, past medical history, past social history, past surgical history and problem list  All pertinent labs and images were reviewed          Past Medical History  Past Medical History:   Diagnosis Date    Diabetes mellitus ([facility] )     Hypertension     Neuropathy in diabetes [facility])        Past Surgical History  Past Surgical History:   Procedure Laterality Date    AMPUTATION      FOOT AMPUTATION Right 9/13/2020    Procedure: AMPUTATION FOOT, removal 5th metatarsal;  Surgeon: Levi Chambers DPM;  [facility];  Service: Podiatry    FOOT SURGERY      [place] INCIS ACHILLES TENDON+LOCAL ANESTH Right 9/13/2020    Procedure: TENOTOMY PERCUTANEOUS ACHILLES;  Surgeon: Levi Chambers DPM;  [facility];  Service: Podiatry   [facility] WOUND DEBRIDEMENT  9/13/2020    Procedure: DEBRIDEMENT FOOT/TOE [facility]); Surgeon: Amanda Goldstein DPM;  [facility];  Service: Podiatry        Allergies: Other    Medications:  Current Outpatient Medications   Medication Sig Dispense Refill    aspirin (ECOTRIN) 325 mg EC tablet TAKE 1 TABLET BY MOUTH EVERY DAY 30 tablet 0    Blood Glucose Monitoring Suppl (FreeStyle Lite) JOSE A       cephalexin (KEFLEX) 250 mg capsule       Cholecalciferol 25 MCG (1000 UT) tablet Take 1,000 Units by mouth daily      Contour Test test strip       gabapentin (NEURONTIN) 300 mg capsule TAKE 1 CAPSULE BY MOUTH THREE TIMES A DAY (Patient taking differently: 600 mg) 90 capsule 0    Lancets (freestyle) lancets       lisinopril (ZESTRIL) 30 mg tablet 40 mg        Melatonin ER 10 MG TBCR Take 10 mg by mouth      metFORMIN (GLUCOPHAGE) 500 mg tablet Take 500 mg by mouth daily      sodium hypochlorite 0 25 percent APPLY 1 APPLICATION TOPICALLY ONCE FOR 1 DOSE      atorvastatin (LIPITOR) 20 mg tablet Take 20 mg by mouth daily      NIFEdipine (PROCARDIA XL) 30 mg 24 hr tablet Take 30 mg by mouth daily       No current facility-administered medications for this visit  Social History:  Social History     Socioeconomic History    Marital status: Registered Domestic Partner     Spouse name: None    Number of children: None    Years of education: None    Highest education level: None   Occupational History    None   Tobacco Use    Smoking status: Current Some Day Smoker     Types: Pipe    Smokeless tobacco: Never Used   Vaping Use    Vaping Use: Never used   Substance and Sexual Activity    Alcohol use:  Yes     Alcohol/week: 1 0 standard drink     Types: 1 Cans of beer per week    Drug use: Never    Sexual activity: None   Other Topics Concern    None   Social History Narrative    None     Social Determinants of Health     Financial Resource Strain: Not on file   Food Insecurity: Not on file Transportation Needs: Not on file   Physical Activity: Not on file   Stress: Not on file   Social Connections: Not on file   Intimate Partner Violence: Not on file   Housing Stability: Not on file          Review of Systems   Constitutional: Negative for chills and fever  Respiratory: Negative for cough and shortness of breath  Cardiovascular: Negative for chest pain  Gastrointestinal: Negative for diarrhea, nausea and vomiting  Genitourinary: Negative for hematuria  Musculoskeletal: Negative for joint swelling and myalgias  Neurological: Positive for numbness  Negative for weakness  Hematological: Negative  Objective:      /60   Pulse 69   Ht 5' 4" (1 626 m) Comment: verbal  Wt 84 4 kg (186 lb)   BMI 31 93 kg/m²          Physical Exam  Vitals reviewed  Constitutional:       General: He is not in acute distress  Appearance: He is well-developed  He is not toxic-appearing or diaphoretic  Cardiovascular:      Rate and Rhythm: Normal rate and regular rhythm  Pulses:           Dorsalis pedis pulses are 1+ on the right side  Posterior tibial pulses are 1+ on the right side  Comments: Mild right LE edema with venous stasis  Pulmonary:      Effort: Pulmonary effort is normal  No respiratory distress  Musculoskeletal:         General: No tenderness or signs of injury  Left lower leg: Edema present  Right foot: No Charcot foot or foot drop  Comments: BKA left  TMA right  No acute joint inflammation or edema  No cellulitis RLE  Left Lower Extremity: Left leg is amputated below knee  Feet:      Right foot:      Skin integrity: No ulcer, skin breakdown, erythema, warmth, callus or dry skin  Skin:     General: Skin is warm  Capillary Refill: Capillary refill takes less than 2 seconds  Coloration: Skin is not cyanotic or mottled  Findings: No abscess, erythema, rash or wound  Nails: There is no clubbing  Neurological:      General: No focal deficit present  Mental Status: He is alert and oriented to person, place, and time  Cranial Nerves: No cranial nerve deficit  Sensory: Sensory deficit present  Motor: No weakness  Coordination: Coordination normal    Psychiatric:         Mood and Affect: Mood normal          Behavior: Behavior normal          Thought Content:  Thought content normal          Judgment: Judgment normal

## 2022-12-22 ENCOUNTER — OFFICE VISIT (OUTPATIENT)
Dept: PODIATRY | Facility: CLINIC | Age: 67
End: 2022-12-22

## 2022-12-22 VITALS — BODY MASS INDEX: 32.1 KG/M2 | SYSTOLIC BLOOD PRESSURE: 128 MMHG | WEIGHT: 187 LBS | DIASTOLIC BLOOD PRESSURE: 76 MMHG

## 2022-12-22 DIAGNOSIS — Z89.421 ABSENCE OF TOE OF RIGHT FOOT (HCC): ICD-10-CM

## 2022-12-22 DIAGNOSIS — E11.40 TYPE 2 DIABETES MELLITUS WITH DIABETIC NEUROPATHY, UNSPECIFIED WHETHER LONG TERM INSULIN USE (HCC): Primary | ICD-10-CM

## 2022-12-22 DIAGNOSIS — Z89.512 ABSENCE OF LEFT LOWER LEG BELOW KNEE (HCC): ICD-10-CM

## 2022-12-22 NOTE — PROGRESS NOTES
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)        3  Absence of toe of right foot ([facility] )              PLAN:  1  Reviewed medical records and labs  Patient was counseled and educated on the condition and the diagnosis  He has high risk diabetic feet with history of TMA right, BKA left, and neuropathy  2  Educated disease prevention and risks related to diabetes  Educated proper daily foot care and exam   Instructed proper skin care / protection and footwear  Instructed to identify any signs of infection and related foot problem  3  Continue DM shoes and inserts  4  Recent blood work was reviewed and the last HbA1c was 6 2  Discussed tight BS control with diet and exercise  5  He will return in 6 months  Subjective:     HPI  The patient presents for diabetic foot exam   He has no active ulcer in his right foot  He has high risk diabetic foot with history of 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 and BKA on left side in 2012  BS under control  He has numbness in his foot  He presents with diabetic shoes  No acute pedal problems  The history were reviewed and updated as appropriate: allergies, medications, past family history, past medical history, past social history, past surgical history and problem list  All pertinent labs and images were reviewed          Past Medical History  Past Medical History:   Diagnosis Date   • Diabetes mellitus ([facility] )    • Hypertension    • Neuropathy in diabetes [facility])        Past Surgical History  Past Surgical History:   Procedure Laterality Date   • AMPUTATION     • FOOT AMPUTATION Right 9/13/2020    Procedure: AMPUTATION FOOT, removal 5th metatarsal;  Surgeon: Xiomara Hidalgo DPM;  [facility];  Service: Podiatry   • FOOT SURGERY     • TN INCIS ACHILLES TENDON+LOCAL ANESTH Right 9/13/2020    Procedure: TENOTOMY PERCUTANEOUS ACHILLES;  Surgeon: Bambi Lentz DPM;  [facility];  Service: Podiatry   • WOUND DEBRIDEMENT  9/13/2020    Procedure: DEBRIDEMENT FOOT/TOE [facility]); Surgeon: Bambi Lentz DPM;  [facility];  Service: Podiatry        Allergies: Other    Medications:  Current Outpatient Medications   Medication Sig Dispense Refill   • aspirin (ECOTRIN) 325 mg EC tablet TAKE 1 TABLET BY MOUTH EVERY DAY 30 tablet 0   • atorvastatin (LIPITOR) 20 mg tablet Take 20 mg by mouth daily     • Blood Glucose Monitoring Suppl (FreeStyle Lite) JOSE A      • Cholecalciferol 25 MCG (1000 UT) tablet Take 1,000 Units by mouth daily     • Contour Test test strip      • gabapentin (NEURONTIN) 300 mg capsule TAKE 1 CAPSULE BY MOUTH THREE TIMES A DAY (Patient taking differently: 600 mg) 90 capsule 0   • Lancets (freestyle) lancets      • lisinopril (ZESTRIL) 30 mg tablet 40 mg       • metFORMIN (GLUCOPHAGE) 500 mg tablet Take 500 mg by mouth daily     • NIFEdipine (PROCARDIA XL) 30 mg 24 hr tablet Take 30 mg by mouth daily       No current facility-administered medications for this visit  Social History:  Social History     Socioeconomic History   • Marital status: Registered Domestic Partner     Spouse name: None   • Number of children: None   • Years of education: None   • Highest education level: None   Occupational History   • None   Tobacco Use   • Smoking status: Some Days     Types: Pipe   • Smokeless tobacco: Never   Vaping Use   • Vaping Use: Never used   Substance and Sexual Activity   • Alcohol use:  Yes     Alcohol/week: 1 0 standard drink     Types: 1 Cans of beer per week   • Drug use: Never   • Sexual activity: None   Other Topics Concern   • None   Social History Narrative   • None     Social Determinants of Health     Financial Resource Strain: Not on file   Food Insecurity: Not on file   Transportation Needs: Not on file   Physical Activity: Not on file   Stress: Not on file   Social Connections: Not on file   Intimate Partner Violence: Not on file   Housing Stability: Not on file          Review of Systems   Constitutional: Negative for chills and fever  Respiratory: Negative for cough and shortness of breath  Cardiovascular: Negative for chest pain  Gastrointestinal: Negative for diarrhea, nausea and vomiting  Genitourinary: Negative for hematuria  Musculoskeletal: Negative for joint swelling and myalgias  Neurological: Positive for numbness  Negative for weakness  Hematological: Negative  Objective:      /76   Wt 84 8 kg (187 lb)   BMI 32 10 kg/m²          Physical Exam  Vitals reviewed  Constitutional:       General: He is not in acute distress  Appearance: He is well-developed  He is not toxic-appearing or diaphoretic  Cardiovascular:      Rate and Rhythm: Normal rate and regular rhythm  Pulses:           Dorsalis pedis pulses are 1+ on the right side  Posterior tibial pulses are 1+ on the right side  Comments: Mild right LE edema with venous stasis  Pulmonary:      Effort: Pulmonary effort is normal  No respiratory distress  Musculoskeletal:         General: No tenderness or signs of injury  Left lower leg: Edema present  Right foot: No Charcot foot or foot drop  Comments: BKA left  TMA right  No acute joint inflammation or edema  No cellulitis RLE  Left Lower Extremity: Left leg is amputated below knee  Feet:      Right foot:      Skin integrity: No ulcer, skin breakdown, erythema, warmth, callus or dry skin  Skin:     General: Skin is warm  Capillary Refill: Capillary refill takes less than 2 seconds  Coloration: Skin is not cyanotic or mottled  Findings: No abscess, erythema, rash or wound  Nails: There is no clubbing  Neurological:      General: No focal deficit present        Mental Status: He is alert and oriented to person, place, and time  Cranial Nerves: No cranial nerve deficit  Sensory: Sensory deficit present  Motor: No weakness  Coordination: Coordination normal    Psychiatric:         Mood and Affect: Mood normal          Behavior: Behavior normal          Thought Content:  Thought content normal          Judgment: Judgment normal

## 2023-06-22 ENCOUNTER — OFFICE VISIT (OUTPATIENT)
Dept: PODIATRY | Facility: CLINIC | Age: 68
End: 2023-06-22
Payer: COMMERCIAL

## 2023-06-22 VITALS
WEIGHT: 166.4 LBS | HEART RATE: 60 BPM | SYSTOLIC BLOOD PRESSURE: 137 MMHG | BODY MASS INDEX: 28.41 KG/M2 | DIASTOLIC BLOOD PRESSURE: 64 MMHG | HEIGHT: 64 IN

## 2023-06-22 DIAGNOSIS — Z89.512 ABSENCE OF LEFT LOWER LEG BELOW KNEE (HCC): ICD-10-CM

## 2023-06-22 DIAGNOSIS — Z89.421 ABSENCE OF TOE OF RIGHT FOOT (HCC): ICD-10-CM

## 2023-06-22 DIAGNOSIS — E11.40 TYPE 2 DIABETES MELLITUS WITH DIABETIC NEUROPATHY, UNSPECIFIED WHETHER LONG TERM INSULIN USE (HCC): Primary | ICD-10-CM

## 2023-06-22 PROCEDURE — 99213 OFFICE O/P EST LOW 20 MIN: CPT | Performed by: PODIATRIST

## 2023-06-22 RX ORDER — DULOXETIN HYDROCHLORIDE 30 MG/1
30 CAPSULE, DELAYED RELEASE ORAL DAILY
Qty: 90 CAPSULE | Refills: 0 | Status: SHIPPED | OUTPATIENT
Start: 2023-06-22 | End: 2023-09-20

## 2023-06-22 NOTE — PROGRESS NOTES
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)  Diabetic Shoe Inserts    Diabetic Shoe      3  Absence of toe of right foot ([address] )  Diabetic Shoe Inserts    Diabetic Shoe            PLAN:  1  Reviewed medical records and labs  Patient was counseled and educated on the condition and the diagnosis  He has high risk diabetic feet with history of TMA right, BKA left, and neuropathy  2  Educated disease prevention and risks related to diabetes  Educated proper daily foot care and exam   Instructed proper skin care / protection and footwear  Instructed to identify any signs of infection and related foot problem  3  Referred him for new DM shoes and inserts  4  Recent blood work was reviewed and the last HbA1c was 6 2  Discussed tight BS control with diet and exercise  5  Wean off Gabapentin  Will try Cymbalta for neurologic pain at night  6  He will return in 6 months  Subjective:     HPI  The patient presents for diabetic foot exam   He has no active ulcer in his right foot  He has high risk diabetic foot with history of TMA on right foot and BKA on left side  BS under control  He still has significant pain at night time due to neuropathy  Gabapentin does not seem to help  The history were reviewed and updated as appropriate: allergies, medications, past family history, past medical history, past social history, past surgical history and problem list  All pertinent labs and images were reviewed          Past Medical History  Past Medical History:   Diagnosis Date   • Diabetes mellitus ([address] )    • Hypertension    • Neuropathy in diabetes [facility])        Past Surgical History  Past Surgical History:   Procedure Laterality Date   • AMPUTATION     • FOOT AMPUTATION Right 9/13/2020    Procedure: AMPUTATION FOOT, removal 5th metatarsal;  Surgeon: Venecia Parks DPM;  [facility];  Service: Podiatry   • FOOT SURGERY     • UT TENOTOMY PRQ ACHILLES TENDON [address] [facility] LOCAL ANES Right 9/13/2020    Procedure: TENOTOMY PERCUTANEOUS ACHILLES;  Surgeon: Venecia Parks DPM;  [facility];  Service: Podiatry   • WOUND DEBRIDEMENT  9/13/2020    Procedure: DEBRIDEMENT FOOT/TOE [facility]); Surgeon: Venecia Parks DPM;  [facility];  Service: Podiatry        Allergies: Other    Medications:  Current Outpatient Medications   Medication Sig Dispense Refill   • aspirin (ECOTRIN) 325 mg EC tablet TAKE 1 TABLET BY MOUTH EVERY DAY 30 tablet 0   • Blood Glucose Monitoring Suppl (FreeStyle Lite) JOSE A      • Cholecalciferol 25 MCG (1000 UT) tablet Take 1,000 Units by mouth daily     • Contour Test test strip      • DULoxetine (Cymbalta) 30 mg delayed release capsule Take 1 capsule (30 mg total) by mouth daily 90 capsule 0   • Lancets (freestyle) lancets      • lisinopril (ZESTRIL) 30 mg tablet 40 mg       • metFORMIN (GLUCOPHAGE) 500 mg tablet Take 500 mg by mouth daily     • atorvastatin (LIPITOR) 20 mg tablet Take 20 mg by mouth daily     • NIFEdipine (PROCARDIA XL) 30 mg 24 hr tablet Take 30 mg by mouth daily       No current facility-administered medications for this visit  Social History:  Social History     Socioeconomic History   • Marital status: Registered Domestic Partner     Spouse name: None   • Number of children: None   • Years of education: None   • Highest education level: None   Occupational History   • None   Tobacco Use   • Smoking status: Some Days     Types: Pipe   • Smokeless tobacco: Never   Vaping Use   • Vaping Use: Never used   Substance and Sexual Activity   • Alcohol use:  Yes     Alcohol/week: 1 0 standard drink of alcohol     Types: 1 Cans of beer per week   • Drug use: Never   • Sexual "activity: None   Other Topics Concern   • None   Social History Narrative   • None     Social Determinants of Health     Financial Resource Strain: Not on file   Food Insecurity: Not on file   Transportation Needs: Not on file   Physical Activity: Not on file   Stress: Not on file   Social Connections: Not on file   Intimate Partner Violence: Not on file   Housing Stability: Not on file          Review of Systems   Constitutional: Negative for chills and fever  Respiratory: Negative for cough and shortness of breath  Cardiovascular: Negative for chest pain  Gastrointestinal: Negative for diarrhea, nausea and vomiting  Genitourinary: Negative for hematuria  Musculoskeletal: Negative for joint swelling and myalgias  Neurological: Positive for numbness  Negative for weakness  Hematological: Negative  Objective:      /64   Pulse 60   Ht 5' 4\" (1 626 m)   Wt 75 5 kg (166 lb 6 4 oz)   BMI 28 56 kg/m²          Physical Exam  Vitals reviewed  Constitutional:       General: He is not in acute distress  Appearance: He is well-developed  He is not toxic-appearing or diaphoretic  Cardiovascular:      Rate and Rhythm: Normal rate and regular rhythm  Pulses: no weak pulses          Dorsalis pedis pulses are 1+ on the right side  Posterior tibial pulses are 1+ on the right side  Comments: Mild right LE edema with venous stasis  Pulmonary:      Effort: Pulmonary effort is normal  No respiratory distress  Musculoskeletal:         General: No tenderness or signs of injury  Left lower leg: Edema present  Right foot: No Charcot foot or foot drop  Comments: BKA left  TMA right  No acute joint inflammation or edema  No cellulitis RLE  Left Lower Extremity: Left leg is amputated below knee  Feet:      Right foot: amputated     Skin integrity: No ulcer, skin breakdown, erythema, warmth or callus        Left foot: amputated  Skin:     General: Skin is " warm       Capillary Refill: Capillary refill takes less than 2 seconds  Coloration: Skin is not cyanotic or mottled  Findings: No abscess, erythema, rash or wound  Nails: There is no clubbing  Neurological:      General: No focal deficit present  Mental Status: He is alert and oriented to person, place, and time  Cranial Nerves: No cranial nerve deficit  Sensory: Sensory deficit present  Motor: No weakness  Coordination: Coordination normal    Psychiatric:         Mood and Affect: Mood normal          Behavior: Behavior normal          Thought Content: Thought content normal          Judgment: Judgment normal            Diabetic Foot Exam    Patient's shoes and socks removed  Right Foot/Ankle   Right Foot Inspection  Skin Exam: No warmth, no callus, no erythema, no maceration, no ulcer and no callus  Amputation: amputation right foot (Comments: TMA)    Sensory   Monofilament testing: absent    Vascular  Capillary refills: < 3 seconds  The right DP pulse is 1+  The right PT pulse is 1+       Left Foot/Ankle  Left Foot Inspection  Amputation: amputation left foot (Comments: BKA)    Assign Risk Category  No deformity present  Loss of protective sensation  No weak pulses  Risk: 3

## 2023-09-17 DIAGNOSIS — E11.40 TYPE 2 DIABETES MELLITUS WITH DIABETIC NEUROPATHY, UNSPECIFIED WHETHER LONG TERM INSULIN USE (HCC): ICD-10-CM

## 2023-09-18 RX ORDER — DULOXETIN HYDROCHLORIDE 30 MG/1
30 CAPSULE, DELAYED RELEASE ORAL DAILY
Qty: 90 CAPSULE | Refills: 0 | Status: SHIPPED | OUTPATIENT
Start: 2023-09-18

## 2023-12-20 DIAGNOSIS — E11.40 TYPE 2 DIABETES MELLITUS WITH DIABETIC NEUROPATHY, UNSPECIFIED WHETHER LONG TERM INSULIN USE (HCC): ICD-10-CM

## 2023-12-20 RX ORDER — DULOXETIN HYDROCHLORIDE 30 MG/1
30 CAPSULE, DELAYED RELEASE ORAL DAILY
Qty: 90 CAPSULE | Refills: 0 | Status: SHIPPED | OUTPATIENT
Start: 2023-12-20

## 2023-12-21 ENCOUNTER — OFFICE VISIT (OUTPATIENT)
Dept: PODIATRY | Facility: CLINIC | Age: 68
End: 2023-12-21
Payer: COMMERCIAL

## 2023-12-21 VITALS
WEIGHT: 170.8 LBS | BODY MASS INDEX: 29.16 KG/M2 | DIASTOLIC BLOOD PRESSURE: 63 MMHG | HEIGHT: 64 IN | HEART RATE: 66 BPM | SYSTOLIC BLOOD PRESSURE: 124 MMHG

## 2023-12-21 DIAGNOSIS — Z89.512 ABSENCE OF LEFT LOWER LEG BELOW KNEE (HCC): ICD-10-CM

## 2023-12-21 DIAGNOSIS — E11.40 TYPE 2 DIABETES MELLITUS WITH DIABETIC NEUROPATHY, UNSPECIFIED WHETHER LONG TERM INSULIN USE (HCC): Primary | ICD-10-CM

## 2023-12-21 DIAGNOSIS — Z89.421 ABSENCE OF TOE OF RIGHT FOOT (HCC): ICD-10-CM

## 2023-12-21 PROCEDURE — 99213 OFFICE O/P EST LOW 20 MIN: CPT | Performed by: PODIATRIST

## 2023-12-21 NOTE — PROGRESS NOTES
PATIENT:  Ervin Wright  1955       ASSESSMENT:     1. Type 2 diabetes mellitus with diabetic neuropathy, unspecified whether long term insulin use (HCC)        2. 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 Absence of toe of right foot (HCC)              PLAN:  1.  Reviewed medical records and labs.  Patient was counseled and educated on the condition and the diagnosis.  He has high risk diabetic feet with history of TMA right, BKA left, and neuropathy.    2. Educated disease prevention and risks related to diabetes.  Educated proper daily foot care and exam.  Instructed proper skin care / protection and footwear.  Instructed to identify any signs of infection and related foot problem.    3. Recent blood work was reviewed and the last HbA1c was 6.2.  Discussed tight BS control with diet and exercise.    4. Continue Cymbalta for neurologic pain at night.  5. He will return in 6 months.          Subjective:     HPI  The patient presents for diabetic foot exam.  He has no active ulcer in his right foot.  He has high risk diabetic foot with history of TMA on right foot and BKA on left side.  BS under control.  Cymbalta seems to help him much better.     The history were reviewed and updated as appropriate: allergies, medications, past family history, past medical history, past social history, past surgical history and problem list. All pertinent labs and images were reviewed.        Past Medical History  Past Medical History:   Diagnosis Date    Diabetes mellitus (HCC)     Hypertension     Neuropathy in diabetes (HCC)        Past Surgical History  Past Surgical History:   Procedure Laterality Date    AMPUTATION      FOOT AMPUTATION Right 9/13/2020    Procedure: AMPUTATION FOOT, removal 5th metatarsal;  Surgeon: Russ Méndez DPM;  [facility];  Service: Podiatry    FOOT SURGERY      MS TENOTOMY PRQ ACHILLES TENDON SPX LOCAL ANES Right 9/13/2020    Procedure: TENOTOMY  PERCUTANEOUS ACHILLES;  Surgeon: Russ Méndez DPM;  [facility];  Service: Podiatry    WOUND DEBRIDEMENT  9/13/2020    Procedure: DEBRIDEMENT FOOT/TOE (WASH OUT);  Surgeon: Russ Méndez DPM;  [facility];  Service: Podiatry        Allergies:  Other    Medications:  Current Outpatient Medications   Medication Sig Dispense Refill    aspirin (ECOTRIN) 325 mg EC tablet TAKE 1 TABLET BY MOUTH EVERY DAY 30 tablet 0    Blood Glucose Monitoring Suppl (FreeStyle Lite) JOSE A       Cholecalciferol 25 MCG (1000 UT) tablet Take 1,000 Units by mouth daily      Contour Test test strip       DULoxetine (CYMBALTA) 30 mg delayed release capsule TAKE 1 CAPSULE BY MOUTH EVERY DAY 90 capsule 0    Lancets (freestyle) lancets       lisinopril (ZESTRIL) 30 mg tablet 40 mg        metFORMIN (GLUCOPHAGE) 500 mg tablet Take 500 mg by mouth daily      atorvastatin (LIPITOR) 20 mg tablet Take 20 mg by mouth daily      NIFEdipine (PROCARDIA XL) 30 mg 24 hr tablet Take 30 mg by mouth daily       No current facility-administered medications for this visit.       Social History:  Social History     Socioeconomic History    Marital status: Registered Domestic Partner     Spouse name: None    Number of children: None    Years of education: None    Highest education level: None   Occupational History    None   Tobacco Use    Smoking status: Some Days     Types: Pipe    Smokeless tobacco: Never   Vaping Use    Vaping status: Never Used   Substance and Sexual Activity    Alcohol use: Yes     Alcohol/week: 1.0 standard drink of alcohol     Types: 1 Cans of beer per week    Drug use: Never    Sexual activity: None   Other Topics Concern    None   Social History Narrative    None     Social Determinants of Health     Financial Resource Strain: Low Risk  (12/14/2023)    Received from [facility]    Overall Financial Resource Strain (CARDIA)     Difficulty of Paying Living Expenses: Not very hard   Food  Insecurity: No Food Insecurity (12/14/2023)    Received from [facility]    Hunger Vital Sign     Worried About Running Out of Food in the Last Year: Never true     Ran Out of Food in the Last Year: Never true   Transportation Needs: No Transportation Needs (12/14/2023)    Received from [facility]    PRAPARE - Transportation     Lack of Transportation (Medical): No     Lack of Transportation (Non-Medical): No   Physical Activity: Sufficiently Active (12/14/2023)    Received from [facility]    Exercise Vital Sign     Days of Exercise per Week: 7 days     Minutes of Exercise per Session: 30 min   Stress: Stress Concern Present (12/14/2023)    Received from [facility]    [employer] - Occupational Stress Questionnaire     Feeling of Stress : Rather much   Social Connections: Moderately Integrated (12/14/2023)    Received from [facility]    Social Connection and Isolation Panel [NHANES]     Frequency of Communication with Friends and Family: Twice a week     Frequency of Social Gatherings with Friends and Family: Once a week     Attends Nondenominational Services: Never     Active Member of Clubs or Organizations: Yes     Attends Club or Organization Meetings: 1 to 4 times per year     Marital Status: Living with partner   Intimate Partner Violence: Not At Risk (12/14/2023)    Received from [facility]    Humiliation, Afraid, Rape, and Kick questionnaire     Fear of Current or Ex-Partner: No     Emotionally Abused: No     Physically Abused: No     Sexually Abused: No   Housing Stability: Low Risk  (12/14/2023)    Received from [facility]    Housing Stability Vital Sign     Unable to Pay for Housing in the Last Year: No     Number of Places Lived in the Last Year: 1     Unstable Housing in the Last Year: No          Review of Systems   Constitutional:  Negative for chills and  "fever.   Respiratory:  Negative for cough and shortness of breath.    Cardiovascular:  Negative for chest pain.   Gastrointestinal:  Negative for diarrhea, nausea and vomiting.   Genitourinary:  Negative for hematuria.   Musculoskeletal:  Negative for joint swelling and myalgias.   Neurological:  Positive for numbness. Negative for weakness.   Hematological: Negative.          Objective:      /63   Pulse 66   Ht 5' 4\" (1.626 m)   Wt 77.5 kg (170 lb 12.8 oz)   BMI 29.32 kg/m²          Physical Exam  Vitals reviewed.   Constitutional:       General: He is not in acute distress.     Appearance: He is well-developed. He is not toxic-appearing or diaphoretic.   Cardiovascular:      Rate and Rhythm: Normal rate and regular rhythm.      Pulses: no weak pulses          Dorsalis pedis pulses are 1+ on the right side.        Posterior tibial pulses are 1+ on the right side.      Comments: Mild right LE edema with venous stasis.   Pulmonary:      Effort: Pulmonary effort is normal. No respiratory distress.   Musculoskeletal:         General: No tenderness or signs of injury.      Left lower leg: Edema present.      Right foot: No Charcot foot or foot drop.      Comments: BKA left.  TMA right.  No acute joint inflammation or edema.   No cellulitis RLE.        Left Lower Extremity: Left leg is amputated below knee.   Feet:      Right foot: amputated     Skin integrity: No ulcer, skin breakdown, erythema, warmth or callus.      Left foot: amputated  Skin:     General: Skin is warm.      Capillary Refill: Capillary refill takes less than 2 seconds.      Coloration: Skin is not cyanotic or mottled.      Findings: No abscess, erythema, rash or wound.      Nails: There is no clubbing.   Neurological:      General: No focal deficit present.      Mental Status: He is alert and oriented to person, place, and time.      Cranial Nerves: No cranial nerve deficit.      Sensory: Sensory deficit present.      Motor: No weakness.     "  Coordination: Coordination normal.   Psychiatric:         Mood and Affect: Mood normal.         Behavior: Behavior normal.         Thought Content: Thought content normal.         Judgment: Judgment normal.           Diabetic Foot Exam    Patient's shoes and socks removed.    Right Foot/Ankle   Right Foot Inspection  Skin Exam: No warmth, no callus, no erythema, no maceration, no ulcer and no callus. Amputation: amputation right foot (Comments: TMA)    Sensory   Monofilament testing: absent    Vascular  Capillary refills: < 3 seconds  The right DP pulse is 1+. The right PT pulse is 1+.     Left Foot/Ankle  Left Foot Inspection  Amputation: amputation left foot (Comments: BKA)    Assign Risk Category  No deformity present  Loss of protective sensation  No weak pulses  Risk: 3

## 2024-06-20 ENCOUNTER — OFFICE VISIT (OUTPATIENT)
Dept: PODIATRY | Facility: CLINIC | Age: 69
End: 2024-06-20
Payer: COMMERCIAL

## 2024-06-20 VITALS
HEART RATE: 78 BPM | WEIGHT: 167 LBS | HEIGHT: 64 IN | SYSTOLIC BLOOD PRESSURE: 150 MMHG | DIASTOLIC BLOOD PRESSURE: 79 MMHG | BODY MASS INDEX: 28.51 KG/M2

## 2024-06-20 DIAGNOSIS — Z89.512 ABSENCE OF LEFT LOWER LEG BELOW KNEE (HCC): ICD-10-CM

## 2024-06-20 DIAGNOSIS — Z89.421 ABSENCE OF TOE OF RIGHT FOOT (HCC): ICD-10-CM

## 2024-06-20 DIAGNOSIS — E11.40 TYPE 2 DIABETES MELLITUS WITH DIABETIC NEUROPATHY, UNSPECIFIED WHETHER LONG TERM INSULIN USE (HCC): Primary | ICD-10-CM

## 2024-06-20 PROCEDURE — 99213 OFFICE O/P EST LOW 20 MIN: CPT | Performed by: PODIATRIST

## 2024-06-20 NOTE — PROGRESS NOTES
PATIENT:  Ervin Wright  1955       ASSESSMENT:     1. Type 2 diabetes mellitus with diabetic neuropathy, unspecified whether long term insulin use (HCC)  Diabetic Shoe Inserts    Diabetic Shoe      2. 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  Reviewed medical records and labs.  Patient was counseled and educated on the condition and the diagnosis.  He has high risk diabetic feet with history of TMA right, BKA left, and neuropathy.    2. Educated disease prevention and risks related to diabetes.  Educated proper daily foot care and exam.  Instructed proper skin care / protection and footwear.  Instructed to identify any signs of infection and related foot problem.    3. Referred him for new DM shoes and inserts.  4. Recent blood work was reviewed and the last HbA1c was 6.2.  Discussed tight BS control with diet and exercise.    5. Continue Cymbalta for neurologic pain at night.  6. He will return in 6 months.          Subjective:     HPI  The patient presents for diabetic foot exam.  He has no active ulcer in his right foot.  He has high risk diabetic foot with history of TMA on right foot and BKA on left side.  BS under control.   Neurologic pain is stable with Cymbalta.      The history were reviewed and updated as appropriate: allergies, medications, past family history, past medical history, past social history, past surgical history and problem list. All pertinent labs and images were reviewed.        Past Medical History  Past Medical History:   Diagnosis Date    Diabetes mellitus (HCC)     Hypertension     Neuropathy in diabetes (HCC)        Past Surgical History  Past Surgical History:   Procedure Laterality Date    AMPUTATION      FOOT AMPUTATION Right 9/13/2020    Procedure: AMPUTATION FOOT, removal 5th metatarsal;  Surgeon: Russ  CHAGO Méndez;  Location: [facility];  Service: Podiatry    FOOT SURGERY      NH TENOTOMY PRQ ACHILLES TENDON SPX LOCAL ANES Right 9/13/2020    Procedure: TENOTOMY PERCUTANEOUS ACHILLES;  Surgeon: Russ Méndez DPM;  Location: [facility];  Service: Podiatry    WOUND DEBRIDEMENT  9/13/2020    Procedure: DEBRIDEMENT FOOT/TOE (WASH OUT);  Surgeon: Russ Méndez DPM;  Location: [facility];  Service: Podiatry        Allergies:  Other    Medications:  Current Outpatient Medications   Medication Sig Dispense Refill    aspirin (ECOTRIN) 325 mg EC tablet TAKE 1 TABLET BY MOUTH EVERY DAY 30 tablet 0    Blood Glucose Monitoring Suppl (FreeStyle Lite) JOSE A       Cholecalciferol 25 MCG (1000 UT) tablet Take 1,000 Units by mouth daily      Contour Test test strip       DULoxetine (CYMBALTA) 30 mg delayed release capsule TAKE 1 CAPSULE BY MOUTH EVERY DAY 90 capsule 0    Lancets (freestyle) lancets       lisinopril (ZESTRIL) 30 mg tablet 40 mg        metFORMIN (GLUCOPHAGE) 500 mg tablet Take 500 mg by mouth daily      atorvastatin (LIPITOR) 20 mg tablet Take 20 mg by mouth daily      NIFEdipine (PROCARDIA XL) 30 mg 24 hr tablet Take 30 mg by mouth daily       No current facility-administered medications for this visit.       Social History:  Social History     Socioeconomic History    Marital status: Registered Domestic Partner     Spouse name: None    Number of children: None    Years of education: None    Highest education level: None   Occupational History    None   Tobacco Use    Smoking status: Some Days     Types: Pipe    Smokeless tobacco: Never   Vaping Use    Vaping status: Never Used   Substance and Sexual Activity    Alcohol use: Yes     Alcohol/week: 1.0 standard drink of alcohol     Types: 1 Cans of beer per week    Drug use: Never    Sexual activity: None   Other Topics Concern    None   Social History Narrative    None     Social Determinants of Health     Financial Resource Strain: Low Risk   (12/14/2023)    [facility]    Overall Financial Resource Strain (CARDIA)     Difficulty of Paying Living Expenses: Not very hard   Food Insecurity: No Food Insecurity (12/14/2023)    [facility] from [facility], [facility]    Hunger Vital Sign     Worried About Running Out of Food in the Last Year: Never true     Ran Out of Food in the Last Year: Never true   Transportation Needs: No Transportation Needs (12/14/2023)    Received from [facility], [facility]    PRAPARE - Transportation     Lack of Transportation (Medical): No     Lack of Transportation (Non-Medical): No   Physical Activity: Sufficiently Active (12/14/2023)    Received from [facility]    Exercise Vital Sign     Days of Exercise per Week: 7 days     Minutes of Exercise per Session: 30 min   Stress: Stress Concern Present (12/14/2023)    Received from [facility], [facility]    [employer] - Occupational Stress Questionnaire     Feeling of Stress : Rather much   Social Connections: Moderately Integrated (12/14/2023)    Received from [facility], [facility]    Social Connection and Isolation Panel [NHANES]     Frequency of Communication with Friends and Family: Twice a week     Frequency of Social Gatherings with Friends and Family: Once a week     Attends Yarsani Services: Never     Active Member of Clubs or Organizations: Yes     Attends Club or Organization Meetings: 1 to 4 times per year     Marital Status: Living with partner   Intimate Partner Violence: Not At Risk (12/14/2023)    [facility] from [facility], [facility]    Humiliation, Afraid, Rape, and Kick questionnaire     Fear of Current or Ex-Partner: No     Emotionally Abused: No     Physically Abused: No      "Sexually Abused: No   Housing Stability: Low Risk  (12/14/2023)    [facility]    Housing Stability Vital Sign     Unable to Pay for Housing in the Last Year: No     Number of Places Lived in the Last Year: 1     Unstable Housing in the Last Year: No          Review of Systems   Constitutional:  Negative for chills and fever.   Respiratory:  Negative for cough and shortness of breath.    Cardiovascular:  Negative for chest pain.   Gastrointestinal:  Negative for diarrhea, nausea and vomiting.   Genitourinary:  Negative for hematuria.   Musculoskeletal:  Negative for joint swelling and myalgias.   Neurological:  Positive for numbness. Negative for weakness.   Hematological: Negative.          Objective:      /79   Pulse 78   Ht 5' 4\" (1.626 m)   Wt 75.8 kg (167 lb)   BMI 28.67 kg/m²          Physical Exam  Vitals reviewed.   Constitutional:       General: He is not in acute distress.     Appearance: He is well-developed. He is not toxic-appearing or diaphoretic.   Cardiovascular:      Rate and Rhythm: Normal rate and regular rhythm.      Pulses: no weak pulses.           Dorsalis pedis pulses are 1+ on the right side.        Posterior tibial pulses are 2+ on the right side.      Comments: Mild right LE edema with venous stasis.   Pulmonary:      Effort: Pulmonary effort is normal. No respiratory distress.   Musculoskeletal:         General: No tenderness or signs of injury.      Left lower leg: Edema present.      Right foot: No Charcot foot or foot drop.      Comments: BKA left.  TMA right.  No acute joint inflammation or edema.   No cellulitis RLE.        Left Lower Extremity: Left leg is amputated below knee.   Feet:      Right foot: amputated     Protective Sensation: 10 sites tested.  4 sites sensed.      Skin integrity: No ulcer, skin breakdown, erythema, warmth or callus.      Left foot: amputated  Skin:     General: Skin is warm.      " Capillary Refill: Capillary refill takes less than 2 seconds.      Coloration: Skin is not cyanotic or mottled.      Findings: No abscess, erythema, rash or wound.      Nails: There is no clubbing.   Neurological:      General: No focal deficit present.      Mental Status: He is alert and oriented to person, place, and time.      Cranial Nerves: No cranial nerve deficit.      Sensory: Sensory deficit present.      Motor: No weakness.      Coordination: Coordination normal.   Psychiatric:         Mood and Affect: Mood normal.         Behavior: Behavior normal.         Thought Content: Thought content normal.         Judgment: Judgment normal.           Diabetic Foot Exam    Patient's shoes and socks removed.    Right Foot/Ankle   Right Foot Inspection  Skin Exam: No warmth, no callus, no erythema, no maceration, no ulcer and no callus. Amputation: amputation right foot (Comments: TMA)    Sensory   Monofilament testing: diminished    Vascular  Capillary refills: < 3 seconds  The right DP pulse is 1+. The right PT pulse is 2+.     Left Foot/Ankle  Left Foot Inspection  Amputation: amputation left foot (Comments: BKA)    Assign Risk Category  No deformity present  Loss of protective sensation  No weak pulses  Risk: 3

## 2025-05-12 ENCOUNTER — OFFICE VISIT (OUTPATIENT)
Dept: PODIATRY | Facility: CLINIC | Age: 70
End: 2025-05-12
Payer: MEDICARE

## 2025-05-12 VITALS — WEIGHT: 174.3 LBS | BODY MASS INDEX: 29.76 KG/M2 | HEIGHT: 64 IN

## 2025-05-12 DIAGNOSIS — Z89.512 ABSENCE OF LEFT LOWER LEG BELOW KNEE (HCC): ICD-10-CM

## 2025-05-12 DIAGNOSIS — E11.40 TYPE 2 DIABETES MELLITUS WITH DIABETIC NEUROPATHY, UNSPECIFIED WHETHER LONG TERM INSULIN USE (HCC): Primary | ICD-10-CM

## 2025-05-12 DIAGNOSIS — Z89.421 ABSENCE OF TOE OF RIGHT FOOT (HCC): ICD-10-CM

## 2025-05-12 PROCEDURE — 99213 OFFICE O/P EST LOW 20 MIN: CPT | Performed by: PODIATRIST

## 2025-05-12 RX ORDER — LISINOPRIL 40 MG/1
1 TABLET ORAL DAILY
COMMUNITY
Start: 2025-04-01

## 2025-05-12 RX ORDER — GABAPENTIN 600 MG/1
600 TABLET ORAL 3 TIMES DAILY
COMMUNITY
Start: 2025-04-01

## 2025-05-12 NOTE — PATIENT INSTRUCTIONS
"Patient Education     Foot care for people with diabetes   The Basics   Written by the doctors and editors at [facility]   Why is foot care important if I have diabetes? -- Diabetes can cause nerve damage if your blood sugar is high for a long time. The medical term for this is \"diabetic neuropathy.\"  If you have problems with the nerves in your feet, you might not be able to feel pain in your foot. Normally, people feel pain when they get a cut or a blister on their foot. The pain tells them that they need to treat their cut so it can heal. But people with nerve damage might not feel any pain when their feet get hurt. They might not even know that they have a cut, so they might not treat it. Problems that aren't treated right away can get much worse. For example, an untreated cut can get infected and turn into an open sore.  High blood sugar can also damage blood vessels and decrease blood flow to your feet. This can weaken your skin and make wounds take longer to heal. You are also more likely to get an infection if you have high blood sugar.  How do I take care of my feet? -- Taking good care of your feet can help prevent foot problems. You should:   Wash your feet every day with soap and warm water. Pat your feet dry, and be sure to dry the skin between your toes.   Keep your feet moisturized. Put lotion on the tops and bottoms of your feet, but not between your toes.   Check your feet every day (figure 1). Look for cuts, blisters, redness, or swelling. Use a mirror, or ask someone to help you check the bottoms of your feet. Check all parts of the foot, especially between the toes. Look for broken skin, ulcers, blisters, or redness.   Trim your toenails straight across when needed (figure 2). Do not cut the corners of your toenails. File rough edges. Do not cut your cuticles. Ask for help if you cannot see well or have problems reaching your feet.   Ask your doctor or nurse to check your feet at each visit. Take " your shoes and socks off for these checks.   See a foot care provider (such as a podiatrist) if you have an ingrown toenail, corn, or callus. Do not try to remove corns and calluses yourself.  How do I protect my feet from injury? -- There are several ways to protect your feet. You can:   Wear shoes and socks at all times, even at home. Do not walk barefoot. Wear swim shoes if you go to the beach or a swimming pool.   Choose shoes that fit well. They should not be not too tight or too loose. Your shoes should have plenty of room for your toes (figure 3). Your doctor might give you a prescription for special shoes. Check to see if they are covered by your insurance.   Check your shoes each time before you put them on to make sure that the lining is smooth. Also check to make sure that there is nothing inside the shoes before putting them on.   Do not wear shoes that expose any part of the foot, like sandals, thongs, or clogs.   Wear cotton socks that fit loosely. Do not wear shoes without socks.   Protect your feet from heat and cold. Test bath water before putting your feet in it to make sure that it is not too hot. Do not walk barefoot on hot ground. Take extra care when going outside in the cold and wear warm socks.  What else should I know? -- You can lower your risk for foot problems by keeping your blood sugar levels as close to your goal as possible. Other things you can do include:   Move your ankles and toes often to help with blood flow. You can wear a support stocking to help with swelling.   Walk often. Regular walking helps blood flow.   If you smoke, try to quit. Your doctor or nurse can help. Smoking causes poor blood flow to your feet and can damage your nerves.  When should I call the doctor? -- Call your doctor or nurse for advice if you have:   A fever of 100.4°F (38°C) or higher, chills, or a wound that will not heal   Swelling, redness, warmth around a wound, a foul smell coming from a wound, or  yellowish, greenish, or bloody discharge   Sores or blisters on your feet that hurt more or less than you would expect   Numbness or tingling in your foot or leg   Corns, calluses, blisters, or new sores on your foot   Very dry, scaly, or cracked skin on your feet   Changes in the way your foot joints or arch look  All topics are updated as new evidence becomes available and our peer review process is complete.  This topic retrieved from [employer] on: Mar 13, 2024.  Topic 896926 Version 2.0  Release: 32.2.4 - C32.71  © 2024 UpToDate, Inc. and/or its affiliates. All rights reserved.  figure 1: Foot check for people with diabetes     People with diabetes should check both of their feet every day. It is important to check your feet all over, including in between your toes. If you can't see the bottom of your foot, use a mirror or ask another person to check for you. Let your doctor or nurse know if you find any:  Redness   Cuts or cracks in the skin   Blisters   Swelling   Graphic 56884 Version 3.0  figure 2: Trim your toenails     Trim your toenails straight across and smooth them with a nail file.  Graphic 92769 Version 2.0  figure 3: Correct shoe shape     Choose shoes that fit the right way and are not too tight or too loose. Your shoes should have plenty of room for your toes.  Graphic 61472 Version 2.0  Consumer Information Use and Disclaimer   Disclaimer: This generalized information is a limited summary of diagnosis, treatment, and/or medication information. It is not meant to be comprehensive and should be used as a tool to help the user understand and/or assess potential diagnostic and treatment options. It does NOT include all information about conditions, treatments, medications, side effects, or risks that may apply to a specific patient. It is not intended to be medical advice or a substitute for the medical advice, diagnosis, or treatment of a health care provider based on the health care provider's  examination and assessment of a patient's specific and unique circumstances. Patients must speak with a health care provider for complete information about their health, medical questions, and treatment options, including any risks or benefits regarding use of medications. This information does not endorse any treatments or medications as safe, effective, or approved for treating a specific patient. UpToDate, Inc. and its affiliates disclaim any warranty or liability relating to this information or the use thereof.The use of this information is governed by the Terms of Use, available at https://www.[employer].com/en/know/clinical-effectiveness-terms. 2024© UpToDate, Inc. and its affiliates and/or licensors. All rights reserved.  Copyright   © 2024 Up[employer]Date, Inc. and/or its affiliates. All rights reserved.

## 2025-05-12 NOTE — PROGRESS NOTES
PATIENT:  Ervin Wright  1955       ASSESSMENT:     1. Type 2 diabetes mellitus with diabetic neuropathy, unspecified whether long term insulin use (HCC)  Diabetic Shoe Inserts    Diabetic Shoe      2. 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  Reviewed medical records and labs.  Patient was counseled and educated on the condition and the diagnosis.  He has high risk diabetic feet with history of TMA right, BKA left, and neuropathy.    2. Educated disease prevention and risks related to diabetes.  Educated proper daily foot care and exam.  Instructed proper skin care / protection and footwear.  Instructed to identify any signs of infection and related foot problem.    3. Referred him for new DM shoes and inserts.  4. Recent blood work was reviewed and the last HbA1c was 5.7.  Discussed tight BS control with diet and exercise.    5. Recommended seeing neurology or pain management for neuropathic pain in his foot.  6. He will return in 1 year.          Subjective:     HPI  The patient presents for diabetic foot exam.  He has no active ulcer in his right foot.  He has high risk diabetic foot with history of TMA on right foot and BKA on left side.  BS under control.  He stopped Cymbalta because it did not seem to help him.    The history were reviewed and updated as appropriate: allergies, medications, past family history, past medical history, past social history, past surgical history and problem list. All pertinent labs and images were reviewed.        Past Medical History  Past Medical History:   Diagnosis Date    Diabetes mellitus (HCC)     Hypertension     Neuropathy in diabetes (HCC)        Past Surgical History  Past Surgical History:   Procedure Laterality Date    AMPUTATION      FOOT AMPUTATION Right 9/13/2020    Procedure: AMPUTATION FOOT,  removal 5th metatarsal;  Surgeon: Russ Méndez DPM;  Location: [facility];  Service: Podiatry    FOOT SURGERY      MI TENOTOMY PRQ ACHILLES TENDON SPX LOCAL ANES Right 2020    Procedure: TENOTOMY PERCUTANEOUS ACHILLES;  Surgeon: Russ Méndez DPM;  Location: [facility];  Service: Podiatry    WOUND DEBRIDEMENT  2020    Procedure: DEBRIDEMENT FOOT/TOE (WASH OUT);  Surgeon: Russ Méndez DPM;  Location: [facility];  Service: Podiatry        Allergies:  Other    Medications:  Current Outpatient Medications   Medication Sig Dispense Refill    aspirin (ECOTRIN) 325 mg EC tablet TAKE 1 TABLET BY MOUTH EVERY DAY 30 tablet 0    atorvastatin (LIPITOR) 20 mg tablet Take 20 mg by mouth daily      Blood Glucose Monitoring Suppl (FreeStyle Lite) JOSE A       Cholecalciferol 25 MCG (1000 UT) tablet Take 1,000 Units by mouth daily      Contour Test test strip       DULoxetine (CYMBALTA) 30 mg delayed release capsule TAKE 1 CAPSULE BY MOUTH EVERY DAY 90 capsule 0    gabapentin (NEURONTIN) 600 MG tablet Take 600 mg by mouth 3 (three) times a day      Lancets (freestyle) lancets       lisinopril (ZESTRIL) 40 mg tablet Take 1 tablet by mouth in the morning      metFORMIN (GLUCOPHAGE) 500 mg tablet Take 500 mg by mouth daily      NIFEdipine (PROCARDIA XL) 30 mg 24 hr tablet Take 30 mg by mouth daily       No current facility-administered medications for this visit.       Social History:  Social History     Socioeconomic History    Marital status: Registered Domestic Partner     Spouse name: None    Number of children: None    Years of education: None    Highest education level: None   Occupational History    None   Tobacco Use    Smoking status: Some Days     Current packs/day: 0.00     Average packs/day: 0.3 packs/day for 45.0 years (11.3 ttl pk-yrs)     Types: Pipe, Cigars, Cigarettes     Start date: 1976     Last attempt to quit: 3/25/2021     Years since quittin.1    Smokeless tobacco: Never     Tobacco comments:     None   Vaping Use    Vaping status: Never Used   Substance and Sexual Activity    Alcohol use: Yes     Alcohol/week: 5.0 standard drinks of alcohol     Types: 5 Shots of liquor per week    Drug use: Never    Sexual activity: Yes     Partners: Female     Birth control/protection: Post-menopausal   Other Topics Concern    None   Social History Narrative    None     Social Drivers of Health     Financial Resource Strain: At Risk (3/19/2025)    Received from [facility]    Financial Insecurity     In the last 12 months did you skip medications to save money?: Yes     In the last 12 months was there a time when you needed to see a doctor but could not because of cost?: Yes   Food Insecurity: No Food Insecurity (3/19/2025)    Received from [facility]    Food Insecurity     In the last 12 months did you ever eat less than you felt you should because there wasn't enough money for food?: No   Transportation Needs: No Transportation Needs (3/19/2025)    Received from [facility]    Transportation Needs     In the last 12 months have you ever had to go without healthcare because you didn't have a way to get there?: No   Physical Activity: Sufficiently Active (12/14/2023)    Received from [facility]    Exercise Vital Sign     Days of Exercise per Week: 7 days     Minutes of Exercise per Session: 30 min   Stress: Stress Concern Present (12/14/2023)    [facility] from [facility], [facility]    [employer] [employer] - Occupational Stress Questionnaire     Feeling of Stress : Rather much   Social Connections: Socially Integrated (3/19/2025)    Received from [facility]    Social Connection     Do you often feel lonely?: No   Intimate Partner Violence: Not At Risk (12/14/2023)    Received from [facility], [facility]    Humiliation,  "Afraid, Rape, and Kick questionnaire     Fear of Current or Ex-Partner: No     Emotionally Abused: No     Physically Abused: No     Sexually Abused: No   Housing Stability: Not At Risk (3/19/2025)    Received from [facility]    Housing Stability     Are you worried that in the next 2 months you may not have stable housing?: No          Review of Systems   Constitutional:  Negative for chills and fever.   Respiratory:  Negative for cough and shortness of breath.    Cardiovascular:  Negative for chest pain.   Gastrointestinal:  Negative for diarrhea, nausea and vomiting.   Genitourinary:  Negative for hematuria.   Musculoskeletal:  Negative for joint swelling and myalgias.   Neurological:  Positive for numbness. Negative for weakness.   Hematological: Negative.          Objective:      Ht 5' 4\" (1.626 m)   Wt 79.1 kg (174 lb 4.8 oz)   BMI 29.92 kg/m²          Physical Exam  Vitals reviewed.   Constitutional:       General: He is not in acute distress.     Appearance: He is well-developed. He is not toxic-appearing or diaphoretic.   Cardiovascular:      Rate and Rhythm: Normal rate and regular rhythm.      Pulses: no weak pulses.           Dorsalis pedis pulses are 1+ on the right side.        Posterior tibial pulses are 2+ on the right side.      Comments: Mild right LE edema with venous stasis.   Pulmonary:      Effort: Pulmonary effort is normal. No respiratory distress.   Musculoskeletal:         General: No tenderness or signs of injury.      Left lower leg: Edema present.      Right foot: No Charcot foot or foot drop.      Comments: BKA left.  TMA right.  No acute joint inflammation or edema.   No cellulitis RLE.        Left Lower Extremity: Left leg is amputated below knee.   Feet:      Right foot: amputated     Protective Sensation: 10 sites tested.  4 sites sensed.      Skin integrity: No ulcer, skin breakdown, erythema, warmth or callus.      Left foot: amputated  Skin:     General: Skin " is warm.      Capillary Refill: Capillary refill takes less than 2 seconds.      Coloration: Skin is not cyanotic or mottled.      Findings: No abscess, erythema, rash or wound.      Nails: There is no clubbing.   Neurological:      General: No focal deficit present.      Mental Status: He is alert and oriented to person, place, and time.      Cranial Nerves: No cranial nerve deficit.      Sensory: Sensory deficit present.      Motor: No weakness.      Coordination: Coordination normal.   Psychiatric:         Mood and Affect: Mood normal.         Behavior: Behavior normal.         Thought Content: Thought content normal.         Judgment: Judgment normal.           Diabetic Foot Exam    Patient's shoes and socks removed.    Right Foot/Ankle   Right Foot Inspection  Skin Exam: No warmth, no callus, no erythema, no maceration, no ulcer and no callus. Amputation: amputation right foot (Comments: TMA)    Sensory   Monofilament testing: diminished    Vascular  Capillary refills: < 3 seconds  The right DP pulse is 1+. The right PT pulse is 2+.     Left Foot/Ankle  Left Foot Inspection  Amputation: amputation left foot (Comments: BKA)    Assign Risk Category  No deformity present  Loss of protective sensation  No weak pulses  Risk: 3

## (undated) DEVICE — SUT ETHILON 3-0 FS-1 18 IN 663G

## (undated) DEVICE — CURITY STRETCH BANDAGE: Brand: CURITY

## (undated) DEVICE — INTENDED FOR TISSUE SEPARATION, AND OTHER PROCEDURES THAT REQUIRE A SHARP SURGICAL BLADE TO PUNCTURE OR CUT.: Brand: BARD-PARKER SAFETY BLADES SIZE 15, STERILE

## (undated) DEVICE — UNIVERSAL MAJOR EXTREMITY,KIT: Brand: CARDINAL HEALTH

## (undated) DEVICE — ACE WRAP 4 IN UNSTERILE

## (undated) DEVICE — GAUZE SPONGES,16 PLY: Brand: CURITY

## (undated) DEVICE — SPONGE PVP SCRUB WING STERILE

## (undated) DEVICE — CURITY NON-ADHERENT STRIPS: Brand: CURITY

## (undated) DEVICE — ACE WRAP 6 IN UNSTERILE

## (undated) DEVICE — CUFF TOURNIQUET 18 X 4 IN QUICK CONNECT DISP 1 BLADDER

## (undated) DEVICE — GLOVE INDICATOR PI UNDERGLOVE SZ 7.5 BLUE

## (undated) DEVICE — GLOVE SRG BIOGEL 7.5

## (undated) DEVICE — KERLIX BANDAGE ROLL: Brand: KERLIX

## (undated) DEVICE — SPONGE STICK WITH PVP-I: Brand: KENDALL

## (undated) DEVICE — PAD CAST 4 IN COTTON NON STERILE